# Patient Record
Sex: MALE | Race: BLACK OR AFRICAN AMERICAN | NOT HISPANIC OR LATINO | Employment: FULL TIME | URBAN - METROPOLITAN AREA
[De-identification: names, ages, dates, MRNs, and addresses within clinical notes are randomized per-mention and may not be internally consistent; named-entity substitution may affect disease eponyms.]

---

## 2017-01-06 ENCOUNTER — OFFICE VISIT (OUTPATIENT)
Dept: FAMILY MEDICINE | Facility: CLINIC | Age: 42
End: 2017-01-06
Payer: MEDICAID

## 2017-01-06 VITALS
OXYGEN SATURATION: 98 % | WEIGHT: 246.06 LBS | HEIGHT: 71 IN | RESPIRATION RATE: 12 BRPM | BODY MASS INDEX: 34.45 KG/M2 | HEART RATE: 59 BPM | DIASTOLIC BLOOD PRESSURE: 99 MMHG | SYSTOLIC BLOOD PRESSURE: 142 MMHG

## 2017-01-06 DIAGNOSIS — H65.04 RECURRENT ACUTE SEROUS OTITIS MEDIA OF RIGHT EAR: Primary | ICD-10-CM

## 2017-01-06 DIAGNOSIS — I10 ESSENTIAL HYPERTENSION: ICD-10-CM

## 2017-01-06 DIAGNOSIS — H65.05 RECURRENT ACUTE SEROUS OTITIS MEDIA OF LEFT EAR: ICD-10-CM

## 2017-01-06 PROCEDURE — 99999 PR PBB SHADOW E&M-EST. PATIENT-LVL III: CPT | Mod: PBBFAC,,, | Performed by: PHYSICIAN ASSISTANT

## 2017-01-06 PROCEDURE — 99213 OFFICE O/P EST LOW 20 MIN: CPT | Mod: PBBFAC,PO | Performed by: PHYSICIAN ASSISTANT

## 2017-01-06 PROCEDURE — 99213 OFFICE O/P EST LOW 20 MIN: CPT | Mod: S$PBB,,, | Performed by: PHYSICIAN ASSISTANT

## 2017-01-06 RX ORDER — AMOXICILLIN AND CLAVULANATE POTASSIUM 875; 125 MG/1; MG/1
1 TABLET, FILM COATED ORAL 2 TIMES DAILY
Qty: 20 TABLET | Refills: 0 | Status: SHIPPED | OUTPATIENT
Start: 2017-01-06 | End: 2017-01-16

## 2017-01-06 RX ORDER — AMOXICILLIN AND CLAVULANATE POTASSIUM 875; 125 MG/1; MG/1
1 TABLET, FILM COATED ORAL 2 TIMES DAILY
Qty: 20 TABLET | Refills: 0 | Status: SHIPPED | OUTPATIENT
Start: 2017-01-06 | End: 2017-01-06 | Stop reason: CLARIF

## 2017-01-06 RX ORDER — AMLODIPINE BESYLATE 10 MG/1
10 TABLET ORAL DAILY
COMMUNITY
End: 2017-05-09 | Stop reason: ALTCHOICE

## 2017-01-06 RX ORDER — LOSARTAN POTASSIUM 25 MG/1
25 TABLET ORAL DAILY
COMMUNITY
End: 2017-05-09 | Stop reason: ALTCHOICE

## 2017-01-06 NOTE — PROGRESS NOTES
Subjective:       Patient ID: Shelly Gannon Jr. is a 41 y.o. male.    Chief Complaint: Left ear is still clogged    HPI Comments: Ms. Gannon comes to clinic today concerned about continued ear pain and fullness. The patient was treated with a steroid shot. He was also taking oral antihistamine and decongestent and nasal spray with no improvement. The patient reports that he has not taken any medication for his blood pressure this morning. He cannot recall the name of his blood pressure medications; he reports these are prescribed by his cardiologist.     Review of Systems   Constitutional: Negative for activity change, appetite change and fever.   HENT: Positive for ear pain. Negative for postnasal drip, rhinorrhea and sinus pressure.    Eyes: Negative for visual disturbance.   Respiratory: Negative for cough and shortness of breath.    Cardiovascular: Negative for chest pain.   Neurological: Negative for headaches.   Hematological: Negative for adenopathy.   Psychiatric/Behavioral: The patient is not nervous/anxious.        Objective:      Physical Exam   Constitutional: He is oriented to person, place, and time.   HENT:   Mouth/Throat: Oropharynx is clear and moist. No oropharyngeal exudate.   Bilateral TM bulging and mildly erythematous. Fluid present behind bilateral TM   Cardiovascular: Normal rate, regular rhythm and normal heart sounds.    Pulmonary/Chest: Effort normal and breath sounds normal. He has no wheezes.   Musculoskeletal: He exhibits no edema.   Neurological: He is alert and oriented to person, place, and time.   Skin: No erythema.   Psychiatric: His behavior is normal.       Assessment:       1. Recurrent acute serous otitis media of right ear    2. Essential hypertension    3. Recurrent acute serous otitis media of left ear        Plan:   Shelly was seen today for left ear is still clogged.    Diagnoses and all orders for this visit:    Recurrent acute serous otitis media of right ear  -      Discontinue: amoxicillin-clavulanate 875-125mg (AUGMENTIN) 875-125 mg per tablet; Take 1 tablet by mouth 2 (two) times daily.  -     Ambulatory referral to ENT  -     amoxicillin-clavulanate 875-125mg (AUGMENTIN) 875-125 mg per tablet; Take 1 tablet by mouth 2 (two) times daily.    Essential hypertension  Elevated  Patient did not take medication today  Patient to follow up in 2 weeks for blood pressure check. Patient to bring all medications to blood pressure check  Patient encouraged to be compliant with taking medication daily  Low salt diet  Increase exercise    Recurrent acute serous otitis media of left ear  -     amoxicillin-clavulanate 875-125mg (AUGMENTIN) 875-125 mg per tablet; Take 1 tablet by mouth 2 (two) times daily.    Take antibiotics with food.  Increase fluid intake.  Call the clinic if symptoms worsen, new symptoms develop or if you are not any better after completion of your antibiotics.

## 2017-01-06 NOTE — MR AVS SNAPSHOT
[facility]  [address]  [place] [address]  Phone: [phone]  Fax: [phone]                  Shelly Gannon Jr.   2017 8:20 AM   Office Visit    Description:  Male : 1975   Provider:  Koki Deluna PA-C   Department:  [facility]           Reason for Visit     Left ear is still clogged           Diagnoses this Visit        Comments    Recurrent acute serous otitis media of right ear    -  Primary     Essential hypertension                To Do List           Future Appointments        Provider Department Dept Phone    2017 8:40 AM Koki Deluna PA-C [facility] [phone]      Goals (5 Years of Data)     None       These Medications        Disp Refills Start End    amoxicillin-clavulanate 875-125mg (AUGMENTIN) 875-125 mg per tablet 20 tablet 0 2017    Take 1 tablet by mouth 2 (two) times daily. - Oral    Pharmacy: [address] Ph #: [phone]         [facility]     [facility] On Call Nurse Care Line -  Assistance  Registered nurses in the [facility] On Call Center provide clinical advisement, health education, appointment booking, and other advisory services.  Call for this free service at [phone].             Medications           Message regarding Medications     Verify the changes and/or additions to your medication regime listed below are the same as discussed with your clinician today.  If any of these changes or additions are incorrect, please notify your healthcare provider.        START taking these NEW medications        Refills    amoxicillin-clavulanate 875-125mg (AUGMENTIN) 875-125 mg per tablet 0    Sig: Take 1 tablet by mouth 2 (two) times daily.    Class: Normal    Route: Oral           Verify that the below list of medications is an accurate representation of the medications you are currently taking.  If none  "reported, the list may be blank. If incorrect, please contact your healthcare provider. Carry this list with you in case of emergency.           Current Medications     amoxicillin-clavulanate 875-125mg (AUGMENTIN) 875-125 mg per tablet Take 1 tablet by mouth 2 (two) times daily.           Clinical Reference Information           Vital Signs - Last Recorded  Most recent update: 1/6/2017  8:28 AM by Stephen Kelly MA    BP Pulse Resp Ht Wt SpO2    (!) 142/99 (BP Location: Right arm, Patient Position: Sitting, BP Method: Automatic) (!) 59 12 5' 11" (1.803 m) 111.6 kg (246 lb 0.5 oz) 98%    BMI                34.31 kg/m2          Blood Pressure          Most Recent Value    BP  (!)  142/99      Allergies as of 1/6/2017     No Known Allergies      Immunizations Administered on Date of Encounter - 1/6/2017     None      Orders Placed During Today's Visit      Normal Orders This Visit    Ambulatory referral to ENT       "

## 2017-01-16 ENCOUNTER — DOCUMENTATION ONLY (OUTPATIENT)
Dept: FAMILY MEDICINE | Facility: CLINIC | Age: 42
End: 2017-01-16

## 2017-01-16 NOTE — PROGRESS NOTES
Pre-Visit Chart Review  For Appointment Scheduled on 1/1917    Health Maintenance Due   Topic Date Due    TETANUS VACCINE  04/07/1995    Influenza Vaccine  08/01/2016

## 2017-01-20 ENCOUNTER — CLINICAL SUPPORT (OUTPATIENT)
Dept: FAMILY MEDICINE | Facility: CLINIC | Age: 42
End: 2017-01-20
Payer: MEDICAID

## 2017-01-20 VITALS — SYSTOLIC BLOOD PRESSURE: 129 MMHG | DIASTOLIC BLOOD PRESSURE: 87 MMHG

## 2017-01-24 ENCOUNTER — TELEPHONE (OUTPATIENT)
Dept: FAMILY MEDICINE | Facility: CLINIC | Age: 42
End: 2017-01-24

## 2017-01-24 NOTE — TELEPHONE ENCOUNTER
----- Message from Kaley Gallo sent at 1/23/2017  2:30 PM CST -----  Contact: self [phone]  Please send the referral to Dr Ramirez's office because his ear is still stopped up.  Thank you!

## 2017-01-24 NOTE — TELEPHONE ENCOUNTER
Referral has been faxed to Dr. Ramirez's office and patient has been notified, he states he will call and schedule appointment himself.

## 2017-03-30 ENCOUNTER — TELEPHONE (OUTPATIENT)
Dept: FAMILY MEDICINE | Facility: CLINIC | Age: 42
End: 2017-03-30

## 2017-03-30 NOTE — TELEPHONE ENCOUNTER
Dr Chris Ramirez is not in network with [facility] [employer] Medicaid for adults, patient's referral request was denied as a result. Call placed to patient for notification. No answer received. Left message to call office regarding.

## 2017-03-31 ENCOUNTER — TELEPHONE (OUTPATIENT)
Dept: FAMILY MEDICINE | Facility: CLINIC | Age: 42
End: 2017-03-31

## 2017-03-31 NOTE — TELEPHONE ENCOUNTER
Patient notified referral to Dr. Perez was denied due to Dr. Perez not being in network with [facility] Community [employer] Medicaid for adults. Advised patient he could talk to insurance to see which doctors are covered under his plan and inform office to place a referral to that provider. Patient verbalized understanding.

## 2017-03-31 NOTE — TELEPHONE ENCOUNTER
----- Message from Maddie Parker sent at 3/30/2017  8:26 AM CDT -----  Patient called regarding his referral to  Dr. Bradshaw, ENT,  Please contact patient when authorization is received at  [phone].    Thank you

## 2017-05-01 ENCOUNTER — TELEPHONE (OUTPATIENT)
Dept: FAMILY MEDICINE | Facility: CLINIC | Age: 42
End: 2017-05-01

## 2017-05-01 DIAGNOSIS — H91.92 HEARING LOSS OF LEFT EAR, UNSPECIFIED HEARING LOSS TYPE: Primary | ICD-10-CM

## 2017-05-01 NOTE — TELEPHONE ENCOUNTER
----- Message from Corinna Cohen sent at 5/1/2017 10:15 AM CDT -----  Contact: Patient Came in   Patient came in today regarding a referral for a ENT physician. Patient stated he can not hear at all from the left side and would like  Referral for an ENT that will accept his insurance. He would like to get this set up as soon as possible.     [phone] is the best contact number for him.

## 2017-05-08 ENCOUNTER — DOCUMENTATION ONLY (OUTPATIENT)
Dept: FAMILY MEDICINE | Facility: CLINIC | Age: 42
End: 2017-05-08

## 2017-05-08 NOTE — PROGRESS NOTES
Pre-Visit Chart Review  For Appointment Scheduled on 05/09/2017    Health Maintenance Due   Topic Date Due    TETANUS VACCINE  04/07/1995

## 2017-05-09 ENCOUNTER — OFFICE VISIT (OUTPATIENT)
Dept: FAMILY MEDICINE | Facility: CLINIC | Age: 42
End: 2017-05-09
Payer: MEDICAID

## 2017-05-09 VITALS
BODY MASS INDEX: 34.54 KG/M2 | TEMPERATURE: 98 F | HEART RATE: 62 BPM | HEIGHT: 71 IN | SYSTOLIC BLOOD PRESSURE: 127 MMHG | WEIGHT: 246.69 LBS | DIASTOLIC BLOOD PRESSURE: 85 MMHG

## 2017-05-09 DIAGNOSIS — H69.92 EUSTACHIAN TUBE DYSFUNCTION, LEFT: ICD-10-CM

## 2017-05-09 DIAGNOSIS — Z23 NEED FOR TETANUS BOOSTER: ICD-10-CM

## 2017-05-09 DIAGNOSIS — Z00.00 ANNUAL PHYSICAL EXAM: Primary | ICD-10-CM

## 2017-05-09 PROCEDURE — 99999 PR PBB SHADOW E&M-EST. PATIENT-LVL III: CPT | Mod: PBBFAC,,, | Performed by: FAMILY MEDICINE

## 2017-05-09 PROCEDURE — 99396 PREV VISIT EST AGE 40-64: CPT | Mod: S$PBB,,, | Performed by: FAMILY MEDICINE

## 2017-05-09 PROCEDURE — 99213 OFFICE O/P EST LOW 20 MIN: CPT | Mod: PBBFAC,PO | Performed by: FAMILY MEDICINE

## 2017-05-09 NOTE — PATIENT INSTRUCTIONS
Controlling High Blood Pressure  High blood pressure (hypertension) is often called the silent killer. This is because many people who have it dont know it. High blood pressure is defined as 140/90 mm Hg or higher. Know your blood pressure and remember to check it regularly. Doing so can save your life. Here are some things you can do to help control your blood pressure.    Choose heart-healthy foods  · Select low-salt, low-fat foods. Limit sodium intake to 2,400 mg per day or the amount suggested by your healthcare provider.  · Limit canned, dried, cured, packaged, and fast foods. These can contain a lot of salt.  · Eat 8 to 10 servings of fruits and vegetables every day.  · Choose lean meats, fish, or chicken.  · Eat whole-grain pasta, brown rice, and beans.  · Eat 2 to 3 servings of low-fat or fat-free dairy products.  · Ask your doctor about the DASH eating plan. This plan helps reduce blood pressure.  · When you go to a restaurant, ask that your meal be prepared with no added salt.  Maintain a healthy weight  · Ask your healthcare provider how many calories to eat a day. Then stick to that number.  · Ask your healthcare provider what weight range is healthiest for you. If you are overweight, a weight loss of only 3% to 5% of your body weight can help lower blood pressure. Generally, a good weight loss goal is to lose 10% of your body weight in a year.  · Limit snacks and sweets.  · Get regular exercise.  Get up and get active  · Choose activities you enjoy. Find ones you can do with friends or family. This includes bicycling, dancing, walking, and jogging.  · Park farther away from building entrances.  · Use stairs instead of the elevator.  · When you can, walk or bike instead of driving.  · [place] leaves, garden, or do household repairs.  · Be active at a moderate to vigorous level of physical activity for at least 40 minutes for a minimum of 3 to 4 days a week.   Manage stress  · Make time to relax and enjoy  life. Find time to laugh.  · Communicate your concerns with your loved ones and your healthcare provider.  · Visit with family and friends, and keep up with hobbies.  Limit alcohol and quit smoking  · Men should have no more than 2 drinks per day.  · Women should have no more than 1 drink per day.  · Talk with your healthcare provider about quitting smoking. Smoking significantly increases your risk for heart disease and stroke. Ask your healthcare provider about community smoking cessation programs and other options.  Medicines  If lifestyle changes arent enough, your healthcare provider may prescribe high blood pressure medicine. Take all medicines as prescribed. If you have any questions about your medicines, ask your healthcare provider before stopping or changing them.   Date Last Reviewed: 4/27/2016 © 2000-2016 [employer]. [address] [place] [address], [place] [address]. All rights reserved. This information is not intended as a substitute for professional medical care. Always follow your healthcare professional's instructions.        Weight Management: Getting Started  Healthy bodies come in all shapes and sizes. Not all bodies are made to be thin. For some people, a healthy weight is higher than the average weight listed on weight charts. Your healthcare provider can help you decide on a healthy weight for you.    Reasons to lose weight  Losing weight can help with some health problems, such as high blood pressure, heart disease, diabetes, sleep apnea, and arthritis. You may also feel more energy.  Set your long-term goal  Your goal doesn't even have to be a specific weight. You may decide on a fitness goal (such as being able to walk 10 miles a week), or a health goal (such as lowering your blood pressure). Choose a goal that is measurable and reasonable, so you know when you've reached it. A goal of reaching a BMI of less than 25 is not always reasonable (or possible).   Make an action  plan  Habits dont change overnight. Setting your goals too high can leave you feeling discouraged if you cant reach them. Be realistic. Choose one or two small changes you can make now. Set an action plan for how you are going to make these changes. When you can stick to this plan, keep making a few more small changes. Taking small steps will help you stay on the path to success.  Track your progress  Write down your goals. Then, keep a daily record of your progress. Write down what you eat and how active you are. This record lets you look back on how much youve done. It may also help when youre feeling frustrated. Reward yourself for success. Even if you dont reach every goal, give yourself credit for what you do get done.  Get support  Encouragement from others can help make losing weight easier. Ask your family members and friends for support. They may even want to join you. Also look to your healthcare provider, registered dietitian, and  for help. Your local hospital can give you more information about nutrition, exercise, and weight loss.  Date Last Reviewed: 1/31/2016 © 2000-2016 [employer]. [address] [place] [address]. All rights reserved. This information is not intended as a substitute for professional medical care. Always follow your healthcare professional's instructions.        Walking for Fitness  Fitness walking has something for everyone, even people who are already fit. Walking is one of the safest ways to condition your body aerobically. It can boost energy, help you lose weight, and reduce stress.    Physical benefits  · Walking strengthens your heart and lungs, and tones your muscles.  · When walking, your feet land with less impact than in other sports. This reduces chances of muscle, bone, and joint injury.  · Regular walking improves your cholesterol levels and lowers your risk of heart disease. And it helps you control your blood sugar if  you have diabetes.  · Walking is a weight-bearing activity, which helps maintain bone density. This can help prevent osteoporosis.  Personal rewards  · Taking walks can help you relax and manage stress. And fitness walking may make you feel better about yourself.  · Walking can help you sleep better at night and make you less likely to be depressed.  · Regular walking may help maintain your memory as you get older.  · Walking is a great way to spend extra time with friends and family members. Be sure to invite your dog along!  Q&A about fitness walking  Q: Will walking keep me fit?  A: Yes. Regular walking at the right pace gives you all the benefits of other aerobic activities, such as jogging and swimming.  Q: Will walking help me lose weight and keep it off?  A: Yes. Per mile, walking can burn as many calories as jogging. Your health care provider can help work walking into your weight-loss plan.  Q: Is walking safe for my health?  A: Yes. Walking is safe if you have high blood pressure, diabetes, heart disease, or other conditions. Talk to your health care provider before you start.  Date Last Reviewed: 5/9/2015  © 4132-2748 [employer]. [address] [place] [address], [place] [address]. All rights reserved. This information is not intended as a substitute for professional medical care. Always follow your healthcare professional's instructions.

## 2017-05-09 NOTE — MR AVS SNAPSHOT
[facility]  [address] [place] [address]  Phone: [phone]  Fax: [phone]                  Shelly Gannon Jr.   2017 2:40 PM   Office Visit    Description:  Male : 1975   Provider:  Steven Alba MD   Department:  [facility]           Reason for Visit     problem hearing out of left ear           Diagnoses this Visit        Comments    Annual physical exam    -  Primary     Need for tetanus booster         Eustachian tube dysfunction, left                To Do List           Goals (5 Years of Data)     None      Follow-Up and Disposition     Return in about 1 year (around 2018).      [facility]     [facility] Nurse Care Line -  Assistance  Unless otherwise directed by your provider, please contact [facility], our nurse care line that is available for  assistance.     Registered nurses in the [facility] provide: appointment scheduling, clinical advisement, health education, and other advisory services.  Call: [phone] (toll free)               Medications           Message regarding Medications     Verify the changes and/or additions to your medication regime listed below are the same as discussed with your clinician today.  If any of these changes or additions are incorrect, please notify your healthcare provider.        STOP taking these medications     amlodipine (NORVASC) 10 MG tablet Take 10 mg by mouth once daily.    losartan (COZAAR) 25 MG tablet Take 25 mg by mouth once daily.           Verify that the below list of medications is an accurate representation of the medications you are currently taking.  If none reported, the list may be blank. If incorrect, please contact your healthcare provider. Carry this list with you in case of emergency.           Current Medications            Clinical Reference Information           Your Vitals Were     BP Pulse Temp    127/85 (BP Location: Right arm,  "Patient Position: Sitting, BP Method: Automatic) 62 98.4 °F (36.9 °C) (Oral)    Height Weight BMI    5' 11" (1.803 m) 111.9 kg (246 lb 11.1 oz) 34.41 kg/m2      Blood Pressure          Most Recent Value    BP  127/85      Allergies as of 5/9/2017     No Known Allergies      Immunizations Administered on Date of Encounter - 5/9/2017     Name Date Dose VIS Date Route    TDAP  Incomplete 0.5 mL 2/24/2015 Intramuscular      Orders Placed During Today's Visit      Normal Orders This Visit    Tdap Vaccine     Future Labs/Procedures Expected by Expires    CBC auto differential  5/9/2017 7/8/2018    Comprehensive metabolic panel  5/9/2017 7/8/2018    Lipid panel  5/9/2017 7/8/2018      Instructions      Controlling High Blood Pressure  High blood pressure (hypertension) is often called the silent killer. This is because many people who have it dont know it. High blood pressure is defined as 140/90 mm Hg or higher. Know your blood pressure and remember to check it regularly. Doing so can save your life. Here are some things you can do to help control your blood pressure.    Choose heart-healthy foods  · Select low-salt, low-fat foods. Limit sodium intake to 2,400 mg per day or the amount suggested by your healthcare provider.  · Limit canned, dried, cured, packaged, and fast foods. These can contain a lot of salt.  · Eat 8 to 10 servings of fruits and vegetables every day.  · Choose lean meats, fish, or chicken.  · Eat whole-grain pasta, brown rice, and beans.  · Eat 2 to 3 servings of low-fat or fat-free dairy products.  · Ask your doctor about the DASH eating plan. This plan helps reduce blood pressure.  · When you go to a restaurant, ask that your meal be prepared with no added salt.  Maintain a healthy weight  · Ask your healthcare provider how many calories to eat a day. Then stick to that number.  · Ask your healthcare provider what weight range is healthiest for you. If you are overweight, a weight loss of only 3% to " 5% of your body weight can help lower blood pressure. Generally, a good weight loss goal is to lose 10% of your body weight in a year.  · Limit snacks and sweets.  · Get regular exercise.  Get up and get active  · Choose activities you enjoy. Find ones you can do with friends or family. This includes bicycling, dancing, walking, and jogging.  · Park farther away from building entrances.  · Use stairs instead of the elevator.  · When you can, walk or bike instead of driving.  · [place] leaves, garden, or do household repairs.  · Be active at a moderate to vigorous level of physical activity for at least 40 minutes for a minimum of 3 to 4 days a week.   Manage stress  · Make time to relax and enjoy life. Find time to laugh.  · Communicate your concerns with your loved ones and your healthcare provider.  · Visit with family and friends, and keep up with hobbies.  Limit alcohol and quit smoking  · Men should have no more than 2 drinks per day.  · Women should have no more than 1 drink per day.  · Talk with your healthcare provider about quitting smoking. Smoking significantly increases your risk for heart disease and stroke. Ask your healthcare provider about community smoking cessation programs and other options.  Medicines  If lifestyle changes arent enough, your healthcare provider may prescribe high blood pressure medicine. Take all medicines as prescribed. If you have any questions about your medicines, ask your healthcare provider before stopping or changing them.   Date Last Reviewed: 4/27/2016  © 4479-2643 [employer]. [address] [place] [address], [place] [address]. All rights reserved. This information is not intended as a substitute for professional medical care. Always follow your healthcare professional's instructions.        Weight Management: Getting Started  Healthy bodies come in all shapes and sizes. Not all bodies are made to be thin. For some people, a healthy weight is higher than the average  weight listed on weight charts. Your healthcare provider can help you decide on a healthy weight for you.    Reasons to lose weight  Losing weight can help with some health problems, such as high blood pressure, heart disease, diabetes, sleep apnea, and arthritis. You may also feel more energy.  Set your long-term goal  Your goal doesn't even have to be a specific weight. You may decide on a fitness goal (such as being able to walk 10 miles a week), or a health goal (such as lowering your blood pressure). Choose a goal that is measurable and reasonable, so you know when you've reached it. A goal of reaching a BMI of less than 25 is not always reasonable (or possible).   Make an action plan  Habits dont change overnight. Setting your goals too high can leave you feeling discouraged if you cant reach them. Be realistic. Choose one or two small changes you can make now. Set an action plan for how you are going to make these changes. When you can stick to this plan, keep making a few more small changes. Taking small steps will help you stay on the path to success.  Track your progress  Write down your goals. Then, keep a daily record of your progress. Write down what you eat and how active you are. This record lets you look back on how much youve done. It may also help when youre feeling frustrated. Reward yourself for success. Even if you dont reach every goal, give yourself credit for what you do get done.  Get support  Encouragement from others can help make losing weight easier. Ask your family members and friends for support. They may even want to join you. Also look to your healthcare provider, registered dietitian, and  for help. Your local hospital can give you more information about nutrition, exercise, and weight loss.  Date Last Reviewed: 1/31/2016  © 6079-7541 [employer]. [address] [place] [address], [place] [address]. All rights reserved. This information is not intended  as a substitute for professional medical care. Always follow your healthcare professional's instructions.        Walking for Fitness  Fitness walking has something for everyone, even people who are already fit. Walking is one of the safest ways to condition your body aerobically. It can boost energy, help you lose weight, and reduce stress.    Physical benefits  · Walking strengthens your heart and lungs, and tones your muscles.  · When walking, your feet land with less impact than in other sports. This reduces chances of muscle, bone, and joint injury.  · Regular walking improves your cholesterol levels and lowers your risk of heart disease. And it helps you control your blood sugar if you have diabetes.  · Walking is a weight-bearing activity, which helps maintain bone density. This can help prevent osteoporosis.  Personal rewards  · Taking walks can help you relax and manage stress. And fitness walking may make you feel better about yourself.  · Walking can help you sleep better at night and make you less likely to be depressed.  · Regular walking may help maintain your memory as you get older.  · Walking is a great way to spend extra time with friends and family members. Be sure to invite your dog along!  Q&A about fitness walking  Q: Will walking keep me fit?  A: Yes. Regular walking at the right pace gives you all the benefits of other aerobic activities, such as jogging and swimming.  Q: Will walking help me lose weight and keep it off?  A: Yes. Per mile, walking can burn as many calories as jogging. Your health care provider can help work walking into your weight-loss plan.  Q: Is walking safe for my health?  A: Yes. Walking is safe if you have high blood pressure, diabetes, heart disease, or other conditions. Talk to your health care provider before you start.  Date Last Reviewed: 5/9/2015  © 4396-8461 [employer]. [address] [place] [address], [place] [address]. All rights reserved. This  information is not intended as a substitute for professional medical care. Always follow your healthcare professional's instructions.             Language Assistance Services     ATTENTION: Language assistance services are available, free of charge. Please call [phone].      ATENCIÓN: Si neville marrero, tiene a hannah disposición servicios gratuitos de asistencia lingüística. Llame al [phone].     CHÚ Ý: N?u b?n nói Ti?ng Vi?t, có các d?ch v? h? tr? ngôn ng? mi?n phí dành cho b?n. G?i s? [phone].         [facility] complies with applicable Federal civil rights laws and does not discriminate on the basis of race, color, national origin, age, disability, or sex.

## 2017-05-23 NOTE — PROGRESS NOTES
"CC:Well exam    He has migraines.  He was  able to tolerate Atenol.  He had recent heart palpitations, short live.   Headaches are infrequent no nausea no vomiting.  Past Medical History:   Diagnosis Date    Allergy     Hypertension     Migraine headache     Migraine syndrome        MEDICATIONS: MedCard reviewed and or reconciled.    ALLERGIES: Allergy card reviewed and or reconciled.    ROS:  GENERAL: Denies weight changes. Denies fever or chills.  HEENT: No vision changes no hearing loss  CHEST: Denies chest pain.   GI: Denies abdominal pain. Denies black stools.  : Denies painful urination. Denies blood in urine.  MS:Back pain as above Denies joint pain. Denies joint swelling.     PE:   Vital Signs: /85 (BP Location: Right arm, Patient Position: Sitting, BP Method: Automatic)   Pulse 62   Temp 98.4 °F (36.9 °C) (Oral)   Ht 5' 11" (1.803 m)   Wt 111.9 kg (246 lb 11.1 oz)   BMI 34.41 kg/m²   APPEARENCE: Well nourished, well developed, in no acute distress.  SKIN: Normal skin turgor, no lesion.  EENT: Pupils are equal, round,  and reactive to light and accommodation.AS TM i Clear sclerae. C.  Congestive turbinates.NO Erythema pharynx.No palpable cervical lymph nodes No  masses or thyromegaly.  CHEST: Lungs clear to auscutation and percussion.No retractions no wheezing  CARDIOVASCULAR: Normal S1, S2. No rubs murmurs or gallops. PMI nondisplaced  ABDOMEN: Soft, no tenderness or masses. No hepatosplenomegaly.  Lumbosacral tenderness, piriformis tenderness.  Greater trochanter nontender.  Right knee crepitations, no effusion, normal exam.  Adequate gait  EXTREMITIES: Full range of motion. No clubbing, cyanosis or edema.    ASSESSMENT:    Annual physical exam  -     Lipid panel; Future  -     Comprehensive metabolic panel; Future  -     CBC auto differential; Future    Need for tetanus booster  -     Tdap Vaccine    Eustachian tube dysfunction, left    Patient readiness: acceptance and " barriers:none    During the course of the visit the patient was educated and counseled about the following:     Obesity:   General weight loss/lifestyle modification strategies discussed (elicit support from others; identify saboteurs; non-food rewards, etc).  Behavioral treatment: Slim Fast and stress management.  Diet interventions: low calorie (1000 kCal/d) deficit diet, qualitative changes (increase low-fat,  high-fiber foods) and referral to dietitian for guidance in these changes.  Regular aerobic exercise program discussed.    Goals: Obesity: Reduce calorie intake and BMI    Did patient meet goals/outcomes: No    The following self management tools provided: excercise log    Patient Instructions (the written plan) was given to the patient/family.     Time spent with patient: 30 minutes

## 2018-08-29 ENCOUNTER — HOSPITAL ENCOUNTER (EMERGENCY)
Facility: HOSPITAL | Age: 43
Discharge: HOME | End: 2018-08-29
Attending: EMERGENCY MEDICINE
Payer: MEDICARE

## 2018-08-29 VITALS
RESPIRATION RATE: 17 BRPM | SYSTOLIC BLOOD PRESSURE: 157 MMHG | OXYGEN SATURATION: 99 % | TEMPERATURE: 99 F | WEIGHT: 235 LBS | HEIGHT: 71 IN | BODY MASS INDEX: 32.9 KG/M2 | DIASTOLIC BLOOD PRESSURE: 100 MMHG | HEART RATE: 63 BPM

## 2018-08-29 DIAGNOSIS — S46.912A SHOULDER STRAIN, LEFT, INITIAL ENCOUNTER: Primary | ICD-10-CM

## 2018-08-29 PROCEDURE — 96372 THER/PROPH/DIAG INJ SC/IM: CPT

## 2018-08-29 PROCEDURE — 99283 EMERGENCY DEPT VISIT LOW MDM: CPT | Mod: 25

## 2018-08-29 PROCEDURE — 63600175 PHARM REV CODE 636 W HCPCS: Performed by: EMERGENCY MEDICINE

## 2018-08-29 RX ORDER — KETOROLAC TROMETHAMINE 30 MG/ML
10 INJECTION, SOLUTION INTRAMUSCULAR; INTRAVENOUS
Status: COMPLETED | OUTPATIENT
Start: 2018-08-29 | End: 2018-08-29

## 2018-08-29 RX ORDER — DEXAMETHASONE SODIUM PHOSPHATE 4 MG/ML
8 INJECTION, SOLUTION INTRA-ARTICULAR; INTRALESIONAL; INTRAMUSCULAR; INTRAVENOUS; SOFT TISSUE
Status: COMPLETED | OUTPATIENT
Start: 2018-08-29 | End: 2018-08-29

## 2018-08-29 RX ORDER — IBUPROFEN 800 MG/1
800 TABLET ORAL EVERY 6 HOURS PRN
Qty: 20 TABLET | Refills: 0 | Status: ON HOLD | OUTPATIENT
Start: 2018-08-29 | End: 2022-01-21 | Stop reason: HOSPADM

## 2018-08-29 RX ORDER — AMLODIPINE BESYLATE 5 MG/1
10 TABLET ORAL DAILY
Qty: 30 TABLET | Refills: 3 | Status: SHIPPED | OUTPATIENT
Start: 2018-08-29 | End: 2019-08-28 | Stop reason: ALTCHOICE

## 2018-08-29 RX ADMIN — DEXAMETHASONE SODIUM PHOSPHATE 8 MG: 4 INJECTION, SOLUTION INTRAMUSCULAR; INTRAVENOUS at 09:08

## 2018-08-29 RX ADMIN — KETOROLAC TROMETHAMINE 10 MG: 30 INJECTION, SOLUTION INTRAMUSCULAR at 09:08

## 2018-08-30 NOTE — ED PROVIDER NOTES
Encounter Date: 8/29/2018    SCRIBE #1 NOTE: Angeline AGUILAR, christy scribing for, and in the presence of, .       History     Chief Complaint   Patient presents with    Shoulder Pain     left shoulder pain beggining last friday   no known trauma to shoulder        Time seen by provider: 8:47 PM on 08/29/2018    Shelly Gannon Jr. is a 43 y.o. male with HTN, who presents to the ED with shoulder pain. Patient states he is having left shoulder pain along the top of his shoulder that is exacerbated by moving his arm specifically when reaching across his body or behind his back. He notes he is an  and is typically working with his arms. Patient denies any numbness, tingling, weakness, neck pain, swelling, or fever. He also requests a refill of his amplodipine.       The history is provided by the patient. No  was used.     Review of patient's allergies indicates:  No Known Allergies  Past Medical History:   Diagnosis Date    Allergy     Hypertension     Migraine headache     Migraine syndrome      Past Surgical History:   Procedure Laterality Date    FRACTURE SURGERY      LUMBAR DISC SURGERY Bilateral     L4- L5 , 2016 April,  Trans    VOCAL FOLD LESION EXCISION  2008     Family History   Problem Relation Age of Onset    Allergic rhinitis Mother     Hypertension Mother     Heart disease Mother     Stroke Mother     Heart disease Father 49    Cancer Maternal Aunt         breast    Heart disease Maternal Aunt     Angioedema Neg Hx     Asthma Neg Hx     Atopy Neg Hx     Eczema Neg Hx     Immunodeficiency Neg Hx     Urticaria Neg Hx      Social History     Tobacco Use    Smoking status: Never Smoker   Substance Use Topics    Alcohol use: No    Drug use: No     Review of Systems   Constitutional: Negative for fever.   HENT: Negative for drooling and sore throat.    Eyes: Negative for photophobia and visual disturbance.   Respiratory: Negative for shortness  of breath.    Cardiovascular: Negative for chest pain.   Gastrointestinal: Negative for abdominal pain, diarrhea, nausea and vomiting.   Genitourinary: Negative for dysuria.   Musculoskeletal: Positive for arthralgias (left shoulder). Negative for back pain, joint swelling and neck pain.   Skin: Negative for rash.   Neurological: Negative for weakness and numbness.   Hematological: Does not bruise/bleed easily.       Physical Exam     Initial Vitals [08/29/18 2040]   BP Pulse Resp Temp SpO2   (!) 157/100 63 17 98.8 °F (37.1 °C) 99 %      MAP       --         Physical Exam    Nursing note and vitals reviewed.  Constitutional: He appears well-developed and well-nourished.  Non-toxic appearance. No distress.   HENT:   Head: Normocephalic and atraumatic.   Eyes: EOM are normal. Pupils are equal, round, and reactive to light.   Neck: Normal range of motion. Neck supple. No neck rigidity. No JVD present.   Cardiovascular: Normal rate, regular rhythm, normal heart sounds and intact distal pulses. Exam reveals no gallop and no friction rub.    No murmur heard.  Pulmonary/Chest: Breath sounds normal. He has no wheezes. He has no rhonchi. He has no rales.   Abdominal: Soft. Bowel sounds are normal. He exhibits no distension. There is no tenderness. There is no rigidity, no rebound and no guarding.   Musculoskeletal: Normal range of motion. He exhibits no edema or tenderness.        Left shoulder: He exhibits pain. He exhibits normal range of motion, no tenderness, no bony tenderness, no swelling, no effusion, no deformity, normal pulse and normal strength.   Full ROM of the shoulder with normal strength, sensation, and pulses. Experiences pain with forward flexion and reaching across. 5/5 strength past abduction 80 degrees. No evidence of joint effusion. No warmth or tenderness.    Neurological: He is alert and oriented to person, place, and time. He has normal strength and normal reflexes. No cranial nerve deficit or sensory  deficit. He exhibits normal muscle tone. Coordination normal. GCS eye subscore is 4. GCS verbal subscore is 5. GCS motor subscore is 6.   5/5 strength and normal sensation of upper extremities.    Skin: Skin is warm and dry.   Psychiatric: He has a normal mood and affect. His speech is normal and behavior is normal. He is not actively hallucinating.         ED Course   Procedures  Labs Reviewed - No data to display       Imaging Results          X-Ray Shoulder 2 or More Views Left (In process)                  Medical Decision Making:   History:   Old Medical Records: I decided to obtain old medical records.  Initial Assessment:   43-year-old man who presents emergency department complaining of shoulder pain for the past 6 days.  Pain happened after waking up from sleep.  No known trauma.  Denies any fever.  Patient with normal strength and full range of motion of the shoulder although he does have some pain with forward flexion.  Neurovascularly intact.  X-rays are unremarkable. I suspect he slept on his shoulder wrong and suffered a shoulder strain.  Will treat him with IM steroid and NSAIDs.  PCP follow-up.  Return for fever or worsening pain. I have low suspicion for septic joint, underlying fracture.  Clinical Tests:   Radiological Study: Ordered and Reviewed            Scribe Attestation:   Scribe #1: I performed the above scribed service and the documentation accurately describes the services I performed. I attest to the accuracy of the note.    I, Bahman Bae, personally performed the services described in this documentation. All medical record entries made by the scribe were at my direction and in my presence.  I have reviewed the chart and agree that the record reflects my personal performance and is accurate and complete. Cornell Ruggiero MD.  9:56 PM 08/29/2018             Clinical Impression:   The encounter diagnosis was Shoulder strain, left, initial encounter.      Disposition:   Disposition:  Discharged  Condition: Stable                        Cornell Ruggiero MD  08/29/18 9048

## 2019-08-26 ENCOUNTER — HOSPITAL ENCOUNTER (EMERGENCY)
Facility: HOSPITAL | Age: 44
Discharge: HOME | End: 2019-08-26
Attending: EMERGENCY MEDICINE
Payer: MEDICAID

## 2019-08-26 VITALS
SYSTOLIC BLOOD PRESSURE: 139 MMHG | BODY MASS INDEX: 32.2 KG/M2 | HEIGHT: 71 IN | TEMPERATURE: 98 F | OXYGEN SATURATION: 94 % | HEART RATE: 64 BPM | DIASTOLIC BLOOD PRESSURE: 87 MMHG | WEIGHT: 230 LBS | RESPIRATION RATE: 21 BRPM

## 2019-08-26 DIAGNOSIS — R07.9 CHEST PAIN: Primary | ICD-10-CM

## 2019-08-26 LAB
ALBUMIN SERPL BCP-MCNC: 4.3 G/DL (ref 3.5–5.2)
ALP SERPL-CCNC: 48 U/L (ref 55–135)
ALT SERPL W/O P-5'-P-CCNC: 23 U/L (ref 10–44)
ANION GAP SERPL CALC-SCNC: 7 MMOL/L (ref 8–16)
AST SERPL-CCNC: 19 U/L (ref 10–40)
BASOPHILS # BLD AUTO: 0.04 K/UL (ref 0–0.2)
BASOPHILS NFR BLD: 0.9 % (ref 0–1.9)
BILIRUB SERPL-MCNC: 0.9 MG/DL (ref 0.1–1)
BUN SERPL-MCNC: 15 MG/DL (ref 6–20)
CALCIUM SERPL-MCNC: 9.5 MG/DL (ref 8.7–10.5)
CHLORIDE SERPL-SCNC: 107 MMOL/L (ref 95–110)
CO2 SERPL-SCNC: 29 MMOL/L (ref 23–29)
CREAT SERPL-MCNC: 1.2 MG/DL (ref 0.5–1.4)
D DIMER PPP IA.FEU-MCNC: 0.31 UG/ML FEU
DIFFERENTIAL METHOD: NORMAL
EOSINOPHIL # BLD AUTO: 0.1 K/UL (ref 0–0.5)
EOSINOPHIL NFR BLD: 2.6 % (ref 0–8)
ERYTHROCYTE [DISTWIDTH] IN BLOOD BY AUTOMATED COUNT: 13.7 % (ref 11.5–14.5)
EST. GFR  (AFRICAN AMERICAN): >60 ML/MIN/1.73 M^2
EST. GFR  (NON AFRICAN AMERICAN): >60 ML/MIN/1.73 M^2
GLUCOSE SERPL-MCNC: 101 MG/DL (ref 70–110)
HCT VFR BLD AUTO: 47.9 % (ref 40–54)
HGB BLD-MCNC: 16 G/DL (ref 14–18)
IMM GRANULOCYTES # BLD AUTO: 0.01 K/UL (ref 0–0.04)
IMM GRANULOCYTES NFR BLD AUTO: 0.2 % (ref 0–0.5)
INR PPP: 1.1
LYMPHOCYTES # BLD AUTO: 1.5 K/UL (ref 1–4.8)
LYMPHOCYTES NFR BLD: 31.8 % (ref 18–48)
MCH RBC QN AUTO: 29.8 PG (ref 27–31)
MCHC RBC AUTO-ENTMCNC: 33.4 G/DL (ref 32–36)
MCV RBC AUTO: 89 FL (ref 82–98)
MONOCYTES # BLD AUTO: 0.5 K/UL (ref 0.3–1)
MONOCYTES NFR BLD: 10.1 % (ref 4–15)
NEUTROPHILS # BLD AUTO: 2.5 K/UL (ref 1.8–7.7)
NEUTROPHILS NFR BLD: 54.4 % (ref 38–73)
NRBC BLD-RTO: 0 /100 WBC
PLATELET # BLD AUTO: 173 K/UL (ref 150–350)
PMV BLD AUTO: 11.5 FL (ref 9.2–12.9)
POTASSIUM SERPL-SCNC: 3.8 MMOL/L (ref 3.5–5.1)
PROT SERPL-MCNC: 7.6 G/DL (ref 6–8.4)
PROTHROMBIN TIME: 13.3 SEC (ref 11.7–14)
RBC # BLD AUTO: 5.37 M/UL (ref 4.6–6.2)
SODIUM SERPL-SCNC: 143 MMOL/L (ref 136–145)
TROPONIN I SERPL DL<=0.01 NG/ML-MCNC: <0.03 NG/ML (ref 0.02–0.04)
WBC # BLD AUTO: 4.66 K/UL (ref 3.9–12.7)

## 2019-08-26 PROCEDURE — 85025 COMPLETE CBC W/AUTO DIFF WBC: CPT

## 2019-08-26 PROCEDURE — 36415 COLL VENOUS BLD VENIPUNCTURE: CPT

## 2019-08-26 PROCEDURE — 85379 FIBRIN DEGRADATION QUANT: CPT

## 2019-08-26 PROCEDURE — 84484 ASSAY OF TROPONIN QUANT: CPT

## 2019-08-26 PROCEDURE — 85610 PROTHROMBIN TIME: CPT

## 2019-08-26 PROCEDURE — 80053 COMPREHEN METABOLIC PANEL: CPT

## 2019-08-26 PROCEDURE — 93005 ELECTROCARDIOGRAM TRACING: CPT

## 2019-08-26 PROCEDURE — 99285 EMERGENCY DEPT VISIT HI MDM: CPT | Mod: 25

## 2019-08-26 NOTE — ED PROVIDER NOTES
Encounter Date: 8/26/2019       History     Chief Complaint   Patient presents with    Chest Pain     44-year-old male presents complaining of burning substernal chest discomfort, patient had these symptoms 2-3 days ago, patient has not had symptoms since then.  Patient has no other complaints at this time patient has a history of hypertension.  Patient denies shortness of breath patient denies palpitations.        Review of patient's allergies indicates:  No Known Allergies  Past Medical History:   Diagnosis Date    Allergy     Hypertension     Migraine headache     Migraine syndrome      Past Surgical History:   Procedure Laterality Date    FRACTURE SURGERY      LUMBAR DISC SURGERY Bilateral     L4- L5 , 2016 April,  Trans    VOCAL FOLD LESION EXCISION  2008     Family History   Problem Relation Age of Onset    Allergic rhinitis Mother     Hypertension Mother     Heart disease Mother     Stroke Mother     Heart disease Father 49    Cancer Maternal Aunt         breast    Heart disease Maternal Aunt     Angioedema Neg Hx     Asthma Neg Hx     Atopy Neg Hx     Eczema Neg Hx     Immunodeficiency Neg Hx     Urticaria Neg Hx      Social History     Tobacco Use    Smoking status: Never Smoker   Substance Use Topics    Alcohol use: No    Drug use: No     Review of Systems   Constitutional: Negative for fever.   HENT: Negative for congestion, rhinorrhea, sore throat and trouble swallowing.    Eyes: Negative for visual disturbance.   Respiratory: Negative for cough, chest tightness, shortness of breath and wheezing.    Cardiovascular: Negative for chest pain, palpitations and leg swelling.        Chest wall pain   Gastrointestinal: Negative for abdominal distention, abdominal pain, constipation, diarrhea, nausea and vomiting.   Genitourinary: Negative for difficulty urinating, dysuria, flank pain and frequency.   Musculoskeletal: Negative for arthralgias, back pain, joint swelling and neck pain.    Skin: Negative for color change and rash.   Neurological: Negative for dizziness, syncope, speech difficulty, weakness, numbness and headaches.   All other systems reviewed and are negative.      Physical Exam     Initial Vitals [08/26/19 1252]   BP Pulse Resp Temp SpO2   (!) 150/97 73 16 98.4 °F (36.9 °C) 98 %      MAP       --         Physical Exam    Nursing note and vitals reviewed.  Constitutional: He appears well-developed and well-nourished. He is not diaphoretic. No distress.   HENT:   Head: Normocephalic and atraumatic.   Right Ear: External ear normal.   Left Ear: External ear normal.   Nose: Nose normal.   Mouth/Throat: Oropharynx is clear and moist. No oropharyngeal exudate.   Eyes: Conjunctivae and EOM are normal. Pupils are equal, round, and reactive to light. Right eye exhibits no discharge. Left eye exhibits no discharge. No scleral icterus.   Neck: Normal range of motion. Neck supple. No thyromegaly present. No tracheal deviation present. No JVD present.   Cardiovascular: Normal rate, regular rhythm, normal heart sounds and intact distal pulses. Exam reveals no gallop and no friction rub.    No murmur heard.  Pulmonary/Chest: Breath sounds normal. No stridor. No respiratory distress. He has no wheezes. He has no rhonchi. He has no rales. He exhibits no tenderness.   Abdominal: Soft. Bowel sounds are normal. He exhibits no distension and no mass. There is no tenderness. There is no rebound and no guarding.   Musculoskeletal: Normal range of motion. He exhibits no edema or tenderness.   Lymphadenopathy:     He has no cervical adenopathy.   Neurological: He is alert and oriented to person, place, and time. He has normal strength and normal reflexes. He displays normal reflexes. No cranial nerve deficit or sensory deficit.   Skin: Skin is warm and dry. No rash noted. No erythema.         ED Course   Procedures  Labs Reviewed   COMPREHENSIVE METABOLIC PANEL - Abnormal; Notable for the following  components:       Result Value    Alkaline Phosphatase 48 (*)     Anion Gap 7 (*)     All other components within normal limits   CBC W/ AUTO DIFFERENTIAL   TROPONIN I   PROTIME-INR   D DIMER, QUANTITATIVE   D DIMER, QUANTITATIVE        ECG Results          EKG 12-lead (In process)  Result time 08/26/19 13:13:18    In process by Interface, Lab In [facility] (08/26/19 13:13:18)                 Narrative:    Test Reason : R07.9,    Vent. Rate : 073 BPM     Atrial Rate : 073 BPM     P-R Int : 152 ms          QRS Dur : 086 ms      QT Int : 406 ms       P-R-T Axes : 055 031 -02 degrees     QTc Int : 447 ms    Normal sinus rhythm  Nonspecific T wave abnormality  Abnormal ECG  When compared with ECG of 29-AUG-2016 17:44,  No significant change was found    Referred By:             Confirmed By:                             Imaging Results          X-Ray Chest 1 View (Final result)  Result time 08/26/19 13:39:26    Final result by Santos Ty MD (08/26/19 13:39:26)                 Impression:      No evidence of active cardiopulmonary disease.      Electronically signed by: Santos Ty MD  Date:    08/26/2019  Time:    13:39             Narrative:    EXAMINATION:  XR CHEST 1 VIEW    CLINICAL HISTORY:  Acute chest pain, unspecified    FINDINGS:  Portable chest radiograph at 1329 hours compared to 07/03/2018 shows the cardiomediastinal silhouette and pulmonary vasculature are within normal limits.    The lungs are normally expanded, with no consolidation, pleural effusion, or evidence of pulmonary edema.  Densely calcified granuloma lies in the left upper lobe.  No confluent infiltrates or pneumothorax. There are no significant osseous abnormalities.                                 Medical Decision Making:   History:   Old Medical Records: I decided to obtain old medical records.  Initial Assessment:   44-year-old male presents complaining of burning chest wall pain, pain was present approximately 3-4 days ago and has now  completely resolved.  Patient is currently pain-free.  Patient has normal blood work, no sign of troponin elevation, patient tolerating p.o., patient is perk negative, patient has a heart score of 1 and a PANKAJ risk score of 0.  This place this patient in the low risk category.  Patient is to follow up with PCP in the next 2-3 days for re-evaluation and cautioned to return immediately to the ER for any worsening or for any further concerns.                      Clinical Impression:       ICD-10-CM ICD-9-CM   1. Chest pain R07.9 786.50                                Vinod Giles MD  08/26/19 2242

## 2019-08-26 NOTE — ED NOTES
Pt states he has chest pain that feels like a burning pain that radiates to his left arm. He says it comes and goes but today since it radiated to his arm he got worried and came in. Pain 6/10. Pt is alert and oriented and in no acute distress. Pt is hooked up to the cardiac monitor. Sinus rhythm on the monitor.

## 2019-08-28 ENCOUNTER — OFFICE VISIT (OUTPATIENT)
Dept: FAMILY MEDICINE | Facility: CLINIC | Age: 44
End: 2019-08-28
Payer: MEDICAID

## 2019-08-28 VITALS
HEART RATE: 67 BPM | BODY MASS INDEX: 32.5 KG/M2 | DIASTOLIC BLOOD PRESSURE: 84 MMHG | SYSTOLIC BLOOD PRESSURE: 130 MMHG | TEMPERATURE: 99 F | WEIGHT: 232.13 LBS | HEIGHT: 71 IN | OXYGEN SATURATION: 97 %

## 2019-08-28 DIAGNOSIS — K21.00 GASTROESOPHAGEAL REFLUX DISEASE WITH ESOPHAGITIS: ICD-10-CM

## 2019-08-28 DIAGNOSIS — I10 ESSENTIAL HYPERTENSION: ICD-10-CM

## 2019-08-28 DIAGNOSIS — E66.9 OBESITY (BMI 30-39.9): ICD-10-CM

## 2019-08-28 DIAGNOSIS — R00.2 PALPITATIONS: Primary | Chronic | ICD-10-CM

## 2019-08-28 PROCEDURE — 99214 OFFICE O/P EST MOD 30 MIN: CPT | Mod: S$PBB,,, | Performed by: PHYSICIAN ASSISTANT

## 2019-08-28 PROCEDURE — 99999 PR PBB SHADOW E&M-EST. PATIENT-LVL III: CPT | Mod: PBBFAC,,, | Performed by: PHYSICIAN ASSISTANT

## 2019-08-28 PROCEDURE — 99999 PR PBB SHADOW E&M-EST. PATIENT-LVL III: ICD-10-PCS | Mod: PBBFAC,,, | Performed by: PHYSICIAN ASSISTANT

## 2019-08-28 PROCEDURE — 99214 PR OFFICE/OUTPT VISIT, EST, LEVL IV, 30-39 MIN: ICD-10-PCS | Mod: S$PBB,,, | Performed by: PHYSICIAN ASSISTANT

## 2019-08-28 PROCEDURE — 99213 OFFICE O/P EST LOW 20 MIN: CPT | Mod: PBBFAC,PO | Performed by: PHYSICIAN ASSISTANT

## 2019-08-28 RX ORDER — LOSARTAN POTASSIUM 50 MG/1
50 TABLET ORAL DAILY
Qty: 90 TABLET | Refills: 3
Start: 2019-08-28 | End: 2019-09-13 | Stop reason: DRUGHIGH

## 2019-08-28 NOTE — PROGRESS NOTES
Subjective:       Patient ID: Shelly Gannon Jr. is a 44 y.o. male.    Chief Complaint: Hypertension    HPI   Pt in for f/u on recent flare of chest pain and elevated BP  Dr Bailey recently changed BP med to ACE inhibitor that caused cough  Pt seen in ER and was given losartan 50 mg QD  Pt feeling OK  Pt. Sees Dr Bailey back next wk.  Review of Systems   Constitutional: Negative.  Negative for activity change, appetite change, chills, diaphoresis, fatigue, fever and unexpected weight change.   HENT: Negative.    Eyes: Negative.    Respiratory: Negative.  Negative for cough and shortness of breath.    Cardiovascular: Negative.  Negative for chest pain and leg swelling.   Gastrointestinal: Negative.    Endocrine: Negative.    Genitourinary: Negative.    Musculoskeletal: Negative.    Skin: Negative.  Negative for rash.   Neurological: Negative.        Objective:      Physical Exam   Constitutional: He is oriented to person, place, and time. He appears well-developed and well-nourished. No distress.   HENT:   Head: Normocephalic and atraumatic.   Right Ear: External ear normal.   Left Ear: External ear normal.   Nose: Nose normal.   Mouth/Throat: Oropharynx is clear and moist. No oropharyngeal exudate.   Eyes: Conjunctivae are normal. No scleral icterus.   Neck: Normal range of motion. Neck supple. No tracheal deviation present. No thyromegaly present.   Carotids clear   Cardiovascular: Normal rate, regular rhythm, normal heart sounds and intact distal pulses. Exam reveals no gallop and no friction rub.   No murmur heard.  Pulmonary/Chest: Effort normal and breath sounds normal. No stridor. No respiratory distress. He has no wheezes. He has no rales.   Abdominal: Soft. Bowel sounds are normal. He exhibits no distension and no mass. There is no tenderness. There is no rebound and no guarding. No hernia.   No organomegaly   Musculoskeletal: He exhibits no edema or tenderness.   Lymphadenopathy:     He has no  cervical adenopathy.   Neurological: He is alert and oriented to person, place, and time.   Skin: Skin is warm and dry. No rash noted.   Vitals reviewed.      Assessment:       1. Palpitations    2. Obesity (BMI 30-39.9)    3. Essential hypertension        Plan:       Shelly was seen today for hypertension.    Diagnoses and all orders for this visit:    Palpitations    Obesity (BMI 30-39.9)    Essential hypertension  -     losartan (COZAAR) 50 MG tablet; Take 1 tablet (50 mg total) by mouth once daily.    f/u with cardiology  Discussed otc's  Discussed diet

## 2019-09-13 ENCOUNTER — OFFICE VISIT (OUTPATIENT)
Dept: FAMILY MEDICINE | Facility: CLINIC | Age: 44
End: 2019-09-13
Payer: MEDICAID

## 2019-09-13 VITALS
HEIGHT: 71 IN | WEIGHT: 232.56 LBS | BODY MASS INDEX: 32.56 KG/M2 | DIASTOLIC BLOOD PRESSURE: 82 MMHG | OXYGEN SATURATION: 98 % | TEMPERATURE: 98 F | HEART RATE: 70 BPM | SYSTOLIC BLOOD PRESSURE: 140 MMHG

## 2019-09-13 DIAGNOSIS — K21.00 GASTROESOPHAGEAL REFLUX DISEASE WITH ESOPHAGITIS: ICD-10-CM

## 2019-09-13 DIAGNOSIS — R11.0 NAUSEA IN ADULT: ICD-10-CM

## 2019-09-13 DIAGNOSIS — I10 ESSENTIAL HYPERTENSION: Primary | ICD-10-CM

## 2019-09-13 DIAGNOSIS — E66.9 OBESITY (BMI 30.0-34.9): ICD-10-CM

## 2019-09-13 PROCEDURE — 99999 PR PBB SHADOW E&M-EST. PATIENT-LVL IV: CPT | Mod: PBBFAC,,, | Performed by: NURSE PRACTITIONER

## 2019-09-13 PROCEDURE — S0119 ONDANSETRON 4 MG: HCPCS | Mod: PBBFAC,PO

## 2019-09-13 PROCEDURE — 99214 OFFICE O/P EST MOD 30 MIN: CPT | Mod: PBBFAC,PO | Performed by: NURSE PRACTITIONER

## 2019-09-13 PROCEDURE — 99999 PR PBB SHADOW E&M-EST. PATIENT-LVL IV: ICD-10-PCS | Mod: PBBFAC,,, | Performed by: NURSE PRACTITIONER

## 2019-09-13 PROCEDURE — 99214 OFFICE O/P EST MOD 30 MIN: CPT | Mod: S$PBB,,, | Performed by: NURSE PRACTITIONER

## 2019-09-13 PROCEDURE — 99214 PR OFFICE/OUTPT VISIT, EST, LEVL IV, 30-39 MIN: ICD-10-PCS | Mod: S$PBB,,, | Performed by: NURSE PRACTITIONER

## 2019-09-13 RX ORDER — LOSARTAN POTASSIUM 100 MG/1
100 TABLET ORAL DAILY
Status: ON HOLD | COMMUNITY
Start: 2019-09-12 | End: 2022-01-21 | Stop reason: HOSPADM

## 2019-09-13 RX ORDER — OMEPRAZOLE 40 MG/1
40 CAPSULE, DELAYED RELEASE ORAL DAILY
Qty: 30 CAPSULE | Refills: 11 | Status: SHIPPED | OUTPATIENT
Start: 2019-09-13 | End: 2020-12-01 | Stop reason: SDUPTHER

## 2019-09-13 RX ORDER — ONDANSETRON 4 MG/1
8 TABLET, ORALLY DISINTEGRATING ORAL EVERY 6 HOURS PRN
Qty: 20 TABLET | Refills: 1 | Status: ON HOLD | OUTPATIENT
Start: 2019-09-13 | End: 2022-01-21 | Stop reason: HOSPADM

## 2019-09-13 RX ORDER — ONDANSETRON 4 MG/1
4 TABLET, ORALLY DISINTEGRATING ORAL
Status: COMPLETED | OUTPATIENT
Start: 2019-09-13 | End: 2019-09-13

## 2019-09-13 RX ADMIN — ONDANSETRON 4 MG: 4 TABLET, ORALLY DISINTEGRATING ORAL at 11:09

## 2019-09-13 NOTE — PROGRESS NOTES
2 Patent identifiers used (name and ). Administered 4 mg Ondansetron orally. Patient tolerated well. No adverse reactions noted.

## 2019-09-13 NOTE — PROGRESS NOTES
Subjective:       Patient ID: Shelly Gannon Jr. is a 44 y.o. male.    Chief Complaint: Nausea    Nausea   This is a new problem. The current episode started 1 to 4 weeks ago. The problem occurs intermittently. The problem has been unchanged. Associated symptoms include nausea. Pertinent negatives include no change in bowel habit, chest pain, headaches, rash or vomiting. Exacerbated by: worst in morings. Treatments tried: anacids. The treatment provided mild relief.       Past Medical History:   Diagnosis Date    Allergy     Anxiety     Hypertension     Migraine headache     Migraine syndrome        Review of patient's allergies indicates:   Allergen Reactions    Ace inhibitors Other (See Comments)     cough         Current Outpatient Medications:     aspirin (ECOTRIN) 81 MG EC tablet, Take 81 mg by mouth once daily., Disp: , Rfl:     escitalopram oxalate (LEXAPRO) 10 MG tablet, Take 10 mg by mouth once daily., Disp: , Rfl:     ibuprofen (ADVIL,MOTRIN) 800 MG tablet, Take 1 tablet (800 mg total) by mouth every 6 (six) hours as needed for Pain., Disp: 20 tablet, Rfl: 0    LORazepam (ATIVAN) 1 MG tablet, Take 1 mg by mouth every 12 (twelve) hours as needed for Anxiety., Disp: , Rfl:     losartan (COZAAR) 100 MG tablet, Take 100 mg by mouth once daily. , Disp: , Rfl:     metoprolol succinate (TOPROL-XL) 25 MG 24 hr tablet, Take 25 mg by mouth once daily., Disp: , Rfl:     omeprazole (PRILOSEC) 40 MG capsule, Take 1 capsule (40 mg total) by mouth once daily., Disp: 30 capsule, Rfl: 11    ondansetron (ZOFRAN-ODT) 4 MG TbDL, Take 2 tablets (8 mg total) by mouth every 6 (six) hours as needed., Disp: 20 tablet, Rfl: 1    Review of Systems   Constitutional: Negative for unexpected weight change.   HENT: Negative for trouble swallowing.    Eyes: Negative for visual disturbance.   Respiratory: Negative for shortness of breath.    Cardiovascular: Negative for chest pain, palpitations and leg swelling.  "  Gastrointestinal: Positive for nausea. Negative for blood in stool, change in bowel habit and vomiting.   Genitourinary: Negative for hematuria.   Skin: Negative for rash.   Allergic/Immunologic: Negative for immunocompromised state.   Neurological: Negative for headaches.   Hematological: Does not bruise/bleed easily.   Psychiatric/Behavioral: Negative for agitation. The patient is not nervous/anxious.        Objective:      BP (!) 140/82 (BP Location: Right arm, Patient Position: Sitting, BP Method: Large (Manual))   Pulse 70   Temp 98.3 °F (36.8 °C)   Ht 5' 11" (1.803 m)   Wt 105.5 kg (232 lb 9.4 oz)   SpO2 98%   BMI 32.44 kg/m²   Physical Exam   Constitutional: He is oriented to person, place, and time. He appears well-developed and well-nourished.   Eyes: Pupils are equal, round, and reactive to light. Conjunctivae and EOM are normal.   Neck: Normal range of motion. Neck supple.   Cardiovascular: Normal rate, regular rhythm, normal heart sounds and intact distal pulses.   Pulmonary/Chest: Effort normal and breath sounds normal.   Abdominal: Soft. Bowel sounds are normal.   Musculoskeletal: Normal range of motion.   Neurological: He is alert and oriented to person, place, and time.   Skin: Skin is warm and dry.   Psychiatric: He has a normal mood and affect. His behavior is normal. Judgment and thought content normal.       Assessment:       1. Essential hypertension    2. Nausea in adult    3. Gastroesophageal reflux disease with esophagitis    4. Obesity (BMI 30.0-34.9)        Plan:       Essential hypertension  Patient reports taking first 100 mg dose of medication and was taken at 9 am  Low sodium diet  2 weeks nurse visit  2 week blood pressure log  BP Readings from Last 3 Encounters:   09/13/19 (!) 140/82   08/28/19 130/84   08/26/19 139/87     Nausea in adult  -     ondansetron disintegrating tablet 4 mg  -     Ambulatory referral to Gastroenterology  -     US Abdomen Complete; Future; Expected " "date: 09/13/2019  -     omeprazole (PRILOSEC) 40 MG capsule; Take 1 capsule (40 mg total) by mouth once daily.  Dispense: 30 capsule; Refill: 11  -     ondansetron (ZOFRAN-ODT) 4 MG TbDL; Take 2 tablets (8 mg total) by mouth every 6 (six) hours as needed.  Dispense: 20 tablet; Refill: 1    Gastroesophageal reflux disease with esophagitis  -     omeprazole (PRILOSEC) 40 MG capsule; Take 1 capsule (40 mg total) by mouth once daily.  Dispense: 30 capsule; Refill: 11      Obesity (BMI 30.0-34.9)  Counseled patient on his ideal body weight, health consequences of being obese and current recommendations including weekly exercise and a heart healthy diet.  Current BMI is:Estimated body mass index is 32.44 kg/m² as calculated from the following:    Height as of this encounter: 5' 11" (1.803 m).    Weight as of this encounter: 105.5 kg (232 lb 9.4 oz)..  Patient is aware that ideal BMI < 25 or Weight in (lb) to have BMI = 25: 178.9.        Patient readiness: acceptance and barriers:none    During the course of the visit the patient was educated and counseled about the following:     Hypertension:   Dietary sodium restriction.  Regular aerobic exercise.  Obesity:   General weight loss/lifestyle modification strategies discussed (elicit support from others; identify saboteurs; non-food rewards, etc).  Regular aerobic exercise program discussed.    Goals: Hypertension: Reduce Blood Pressure and Obesity: Reduce calorie intake and BMI    Did patient meet goals/outcomes: No    The following self management tools provided: BP log    Patient Instructions (the written plan) was given to the patient/family.     Time spent with patient: 30 minutes    Barriers to medications present (no )    Adverse reactions to current medications (no)    Over the counter medications reviewed (Yes)        "

## 2019-09-13 NOTE — PATIENT INSTRUCTIONS
Tips to Control Acid Reflux    To control acid reflux, youll need to make some basic diet and lifestyle changes. The simple steps outlined below may be all youll need to ease discomfort.  Watch what you eat  · Avoid fatty foods and spicy foods.  · Eat fewer acidic foods, such as citrus and tomato-based foods. These can increase symptoms.  · Limit drinking alcohol, caffeine, and fizzy beverages. All increase acid reflux.  · Try limiting chocolate, peppermint, and spearmint. These can worsen acid reflux in some people.  Watch when you eat  · Avoid lying down for 3 hours after eating.  · Do not snack before going to bed.  Raise your head  Raising your head and upper body by 4 to 6 inches helps limit reflux when youre lying down. Put blocks under the head of your bed frame to raise it.  Other changes  · Lose weight, if you need to  · Dont exercise near bedtime  · Avoid tight-fitting clothes  · Limit aspirin and ibuprofen  · Stop smoking   Date Last Reviewed: 7/1/2016 © 2000-2017 [employer]. [address] [place] [address]. All rights reserved. This information is not intended as a substitute for professional medical care. Always follow your healthcare professional's instructions.        Medicines for Acid Reflux  Your healthcare provider has told you that you have acid reflux. This condition causes stomach acid to wash up into your throat. For most people, acid reflux is troubling but not dangerous. But left untreated, acid reflux sometimes damages the esophagus. Medicines can help control acid reflux and limit your risk of future problems.  Medicines for acid reflux  Your healthcare provider may prescribe medicine to help treat your acid reflux. Medicine will be based on your symptoms and any test results. Your provider will explain how to take your medicine. You will also be told about possible side effects.  Reducing stomach acid  Your provider may suggest antacids that you can buy  over the counter. Antacids can give fast relief. Or you may be told to take a type of medicine called H2 blockers. These are available over the counter and by prescription (for higher doses).  Blocking stomach acid  In more severe cases, your healthcare provider may suggest stronger medicines such as proton pump inhibitors (PPIs). These keep the stomach from making acid. They are often prescribed for long-term use.  Other medicines  In some cases medicines to reduce or block stomach acid may not work. Then you may be switched to another type of medicine that helps your stomach empty better.     Date Last Reviewed: 10/1/2016  © 7005-8562 [employer]. [address] [place] [address], [place] [address]. All rights reserved. This information is not intended as a substitute for professional medical care. Always follow your healthcare professional's instructions.        GERD (Adult)    The esophagus is a tube that carries food from the mouth to the stomach. A valve at the lower end of the esophagus prevents stomach acid from flowing upward. When this valve doesn't work properly, stomach contents may repeatedly flow back up (reflux) into the esophagus. This is called gastroesophageal reflux disease (GERD). GERD can irritate the esophagus. It can cause problems with swallowing or breathing. In severe cases, GERD can cause recurrent pneumonia or other serious problems.  Symptoms of reflux include burning, pressure or sharp pain in the upper abdomen or mid to lower chest. The pain can spread to the neck, back, or shoulder. There may be belching, an acid taste in the back of the throat, chronic cough, or sore throat or hoarseness. GERD symptoms often occur during the day after a big meal. They can also occur at night when lying down.   Home care  Lifestyle changes can help reduce symptoms. If needed, medicines may be prescribed. Symptoms often improve with treatment, but if treatment is stopped, the symptoms often return  "after a few months. So most persons with GERD will need to continue treatment.  Lifestyle changes  · Limit or avoid fatty, fried, and spicy foods, as well as coffee, chocolate, mint, and foods with high acid content such as tomatoes and citrus fruit and juices (orange, grapefruit, lemon).  · Dont eat large meals, especially at night. Frequent, smaller meals are best. Do not lie down right after eating. And dont eat anything 3 hours before going to bed.  · Avoid drinking alcohol and smoking. As much as possible, stay away from second hand smoke.  · If you are overweight, losing weight will reduce symptoms.   · Avoid wearing tight clothing around your stomach area.  · If your symptoms occur during sleep, use a foam wedge to elevate your upper body (not just your head.) Or, place 4" blocks under the head of your bed.  Medicines  If needed, medicines can help relieve the symptoms of GERD and prevent damage to the esophagus. Discuss a medicine plan with your healthcare provider. This may include one or more of the following medicines:  · Antacids to help neutralize the normal acids in your stomach.  · Acid blockers (H2 blockers) to decrease acid production.  · Acid inhibitors (PPIs) to decrease acid production in a different way than the blockers. They may work better, but can take a little longer to take effect.  Take an antacid 30-60 minutes after eating and at bedtime, but not at the same time as an acid blocker.  Try not to take medicines such as ibuprofen and aspirin. If you are taking aspirin for your heart or other medical reasons, talk to your healthcare provider about stopping it.  Follow-up care  Follow up with your healthcare provider or as advised by our staff.  When to seek medical advice  Call your healthcare provider if any of the following occur:  · Stomach pain gets worse or moves to the lower right abdomen (appendix area)  · Chest pain appears or gets worse, or spreads to the back, neck, shoulder, or " arm  · Frequent vomiting (cant keep down liquids)  · Blood in the stool or vomit (red or black in color)  · Feeling weak or dizzy  · Fever of 100.4ºF (38ºC) or higher, or as directed by your healthcare provider  Date Last Reviewed: 6/23/2015  © 0202-7375 [employer]. [address] [place] [address], [place] [address]. All rights reserved. This information is not intended as a substitute for professional medical care. Always follow your healthcare professional's instructions.

## 2019-09-16 ENCOUNTER — HOSPITAL ENCOUNTER (OUTPATIENT)
Dept: RADIOLOGY | Facility: CLINIC | Age: 44
Discharge: HOME | End: 2019-09-16
Attending: NURSE PRACTITIONER
Payer: MEDICAID

## 2019-09-16 ENCOUNTER — PATIENT MESSAGE (OUTPATIENT)
Dept: FAMILY MEDICINE | Facility: CLINIC | Age: 44
End: 2019-09-16

## 2019-09-16 ENCOUNTER — TELEPHONE (OUTPATIENT)
Dept: FAMILY MEDICINE | Facility: CLINIC | Age: 44
End: 2019-09-16

## 2019-09-16 DIAGNOSIS — I10 ESSENTIAL HYPERTENSION: Primary | ICD-10-CM

## 2019-09-16 DIAGNOSIS — R11.0 NAUSEA IN ADULT: ICD-10-CM

## 2019-09-16 PROCEDURE — 76700 US EXAM ABDOM COMPLETE: CPT | Mod: 26,,, | Performed by: RADIOLOGY

## 2019-09-16 PROCEDURE — 76700 US ABDOMEN COMPLETE: ICD-10-PCS | Mod: 26,,, | Performed by: RADIOLOGY

## 2019-09-16 PROCEDURE — 76700 US EXAM ABDOM COMPLETE: CPT | Mod: TC,PO

## 2019-09-16 NOTE — TELEPHONE ENCOUNTER
----- Message from Jeny Benavidez sent at 9/16/2019  7:22 AM CDT -----  Contact: Self   Patient came in today to drop off a blood pressure log for Mrs. Aranda to look at. Please come to [facility] for .

## 2019-09-17 ENCOUNTER — HOSPITAL ENCOUNTER (OUTPATIENT)
Dept: PREADMISSION TESTING | Facility: HOSPITAL | Age: 44
Discharge: HOME | End: 2019-09-17
Attending: INTERNAL MEDICINE
Payer: MEDICAID

## 2019-09-17 ENCOUNTER — HOSPITAL ENCOUNTER (OUTPATIENT)
Dept: RADIOLOGY | Facility: HOSPITAL | Age: 44
Discharge: HOME | End: 2019-09-17
Attending: INTERNAL MEDICINE
Payer: MEDICAID

## 2019-09-17 VITALS — WEIGHT: 229.06 LBS | BODY MASS INDEX: 32.07 KG/M2 | HEIGHT: 71 IN

## 2019-09-17 DIAGNOSIS — I20.89 ANGINA AT REST: ICD-10-CM

## 2019-09-17 DIAGNOSIS — R06.09 DOE (DYSPNEA ON EXERTION): ICD-10-CM

## 2019-09-17 DIAGNOSIS — Z01.811 PRE-PROCEDURAL RESPIRATORY EXAMINATION: Primary | ICD-10-CM

## 2019-09-17 LAB
ALBUMIN SERPL BCP-MCNC: 4.5 G/DL (ref 3.5–5.2)
ALP SERPL-CCNC: 47 U/L (ref 55–135)
ALT SERPL W/O P-5'-P-CCNC: 22 U/L (ref 10–44)
ANION GAP SERPL CALC-SCNC: 7 MMOL/L (ref 8–16)
APTT PPP: 29.7 SEC (ref 26.2–34.7)
AST SERPL-CCNC: 18 U/L (ref 10–40)
BASOPHILS # BLD AUTO: 0.03 K/UL (ref 0–0.2)
BASOPHILS NFR BLD: 0.8 % (ref 0–1.9)
BILIRUB SERPL-MCNC: 0.9 MG/DL (ref 0.1–1)
BUN SERPL-MCNC: 14 MG/DL (ref 6–20)
CALCIUM SERPL-MCNC: 9.2 MG/DL (ref 8.7–10.5)
CHLORIDE SERPL-SCNC: 103 MMOL/L (ref 95–110)
CO2 SERPL-SCNC: 27 MMOL/L (ref 23–29)
CREAT SERPL-MCNC: 1.3 MG/DL (ref 0.5–1.4)
DIFFERENTIAL METHOD: NORMAL
EOSINOPHIL # BLD AUTO: 0 K/UL (ref 0–0.5)
EOSINOPHIL NFR BLD: 1 % (ref 0–8)
ERYTHROCYTE [DISTWIDTH] IN BLOOD BY AUTOMATED COUNT: 13.5 % (ref 11.5–14.5)
EST. GFR  (AFRICAN AMERICAN): >60 ML/MIN/1.73 M^2
EST. GFR  (NON AFRICAN AMERICAN): >60 ML/MIN/1.73 M^2
GLUCOSE SERPL-MCNC: 92 MG/DL (ref 70–110)
HCT VFR BLD AUTO: 48.5 % (ref 40–54)
HGB BLD-MCNC: 16.3 G/DL (ref 14–18)
IMM GRANULOCYTES # BLD AUTO: 0.01 K/UL (ref 0–0.04)
IMM GRANULOCYTES NFR BLD AUTO: 0.3 % (ref 0–0.5)
INR PPP: 1
LYMPHOCYTES # BLD AUTO: 1.1 K/UL (ref 1–4.8)
LYMPHOCYTES NFR BLD: 28.3 % (ref 18–48)
MAGNESIUM SERPL-MCNC: 2.1 MG/DL (ref 1.6–2.6)
MCH RBC QN AUTO: 29.8 PG (ref 27–31)
MCHC RBC AUTO-ENTMCNC: 33.6 G/DL (ref 32–36)
MCV RBC AUTO: 89 FL (ref 82–98)
MONOCYTES # BLD AUTO: 0.4 K/UL (ref 0.3–1)
MONOCYTES NFR BLD: 10.5 % (ref 4–15)
NEUTROPHILS # BLD AUTO: 2.4 K/UL (ref 1.8–7.7)
NEUTROPHILS NFR BLD: 59.1 % (ref 38–73)
NRBC BLD-RTO: 0 /100 WBC
PLATELET # BLD AUTO: 157 K/UL (ref 150–350)
PMV BLD AUTO: 12 FL (ref 9.2–12.9)
POTASSIUM SERPL-SCNC: 4 MMOL/L (ref 3.5–5.1)
PROT SERPL-MCNC: 7.8 G/DL (ref 6–8.4)
PROTHROMBIN TIME: 12.7 SEC (ref 11.7–14)
RBC # BLD AUTO: 5.47 M/UL (ref 4.6–6.2)
SODIUM SERPL-SCNC: 137 MMOL/L (ref 136–145)
WBC # BLD AUTO: 4 K/UL (ref 3.9–12.7)

## 2019-09-17 PROCEDURE — 85610 PROTHROMBIN TIME: CPT

## 2019-09-17 PROCEDURE — 36415 COLL VENOUS BLD VENIPUNCTURE: CPT

## 2019-09-17 PROCEDURE — 85025 COMPLETE CBC W/AUTO DIFF WBC: CPT

## 2019-09-17 PROCEDURE — 80053 COMPREHEN METABOLIC PANEL: CPT

## 2019-09-17 PROCEDURE — 83735 ASSAY OF MAGNESIUM: CPT

## 2019-09-17 PROCEDURE — 85730 THROMBOPLASTIN TIME PARTIAL: CPT

## 2019-09-17 PROCEDURE — 71046 X-RAY EXAM CHEST 2 VIEWS: CPT | Mod: TC

## 2019-09-17 RX ORDER — ESCITALOPRAM OXALATE 10 MG/1
10 TABLET ORAL DAILY
COMMUNITY
End: 2020-12-09 | Stop reason: SDUPTHER

## 2019-09-17 RX ORDER — METOPROLOL SUCCINATE 25 MG/1
25 TABLET, EXTENDED RELEASE ORAL DAILY
COMMUNITY
End: 2021-01-25 | Stop reason: SDUPTHER

## 2019-09-17 RX ORDER — ASPIRIN 81 MG/1
81 TABLET ORAL DAILY
COMMUNITY

## 2019-09-17 RX ORDER — LORAZEPAM 1 MG/1
1 TABLET ORAL EVERY 12 HOURS PRN
Status: ON HOLD | COMMUNITY
End: 2022-01-21 | Stop reason: HOSPADM

## 2019-09-17 NOTE — DISCHARGE INSTRUCTIONS
 Nothing to eat or drink after midnight the night before your procedure.   Do not take any medications the morning of your procedure   Bring all your medications with you in the original pill bottles from pharmacy.   Do your Hibiclens wash the night before and morning of your procedure.   Make arrangements for someone you know to drive you home after your procedure. Taxi and Uber are not acceptable.

## 2019-09-18 RX ORDER — HYDROCHLOROTHIAZIDE 25 MG/1
25 TABLET ORAL DAILY
Qty: 30 TABLET | Refills: 11 | Status: SHIPPED | OUTPATIENT
Start: 2019-09-18 | End: 2019-09-18

## 2019-09-18 NOTE — TELEPHONE ENCOUNTER
Please inform patient that the blood pressure are much better. I would like to see him at 120/80., so I added a diuretic to his medication regimen. Continue to take BP daily and send reading to me in 2 weeks.

## 2019-09-18 NOTE — TELEPHONE ENCOUNTER
Spoke with patient over the phone. Cardiology started him on metoprolol and ativan yesterday. He is scheduled for a angiogram on tomorrow. I canceled the HCTZ.

## 2019-09-19 ENCOUNTER — HOSPITAL ENCOUNTER (OUTPATIENT)
Facility: HOSPITAL | Age: 44
Discharge: HOME | End: 2019-09-19
Attending: INTERNAL MEDICINE | Admitting: INTERNAL MEDICINE
Payer: MEDICAID

## 2019-09-19 VITALS
TEMPERATURE: 98 F | DIASTOLIC BLOOD PRESSURE: 82 MMHG | RESPIRATION RATE: 25 BRPM | SYSTOLIC BLOOD PRESSURE: 121 MMHG | OXYGEN SATURATION: 97 % | HEART RATE: 64 BPM

## 2019-09-19 DIAGNOSIS — R94.39 ABNORMAL STRESS TEST: ICD-10-CM

## 2019-09-19 DIAGNOSIS — R07.89 OTHER CHEST PAIN: ICD-10-CM

## 2019-09-19 PROCEDURE — C1894 INTRO/SHEATH, NON-LASER: HCPCS | Performed by: INTERNAL MEDICINE

## 2019-09-19 PROCEDURE — 93458 L HRT ARTERY/VENTRICLE ANGIO: CPT

## 2019-09-19 PROCEDURE — 63600175 PHARM REV CODE 636 W HCPCS: Performed by: INTERNAL MEDICINE

## 2019-09-19 PROCEDURE — C1887 CATHETER, GUIDING: HCPCS | Performed by: INTERNAL MEDICINE

## 2019-09-19 PROCEDURE — 25000003 PHARM REV CODE 250: Performed by: INTERNAL MEDICINE

## 2019-09-19 RX ORDER — LIDOCAINE HYDROCHLORIDE 10 MG/ML
INJECTION, SOLUTION EPIDURAL; INFILTRATION; INTRACAUDAL; PERINEURAL
Status: DISCONTINUED | OUTPATIENT
Start: 2019-09-19 | End: 2019-09-19 | Stop reason: HOSPADM

## 2019-09-19 RX ORDER — SODIUM CHLORIDE 9 MG/ML
INJECTION, SOLUTION INTRAVENOUS CONTINUOUS
Status: ACTIVE | OUTPATIENT
Start: 2019-09-19 | End: 2019-09-19

## 2019-09-19 RX ORDER — NITROGLYCERIN 5 MG/ML
INJECTION, SOLUTION INTRAVENOUS
Status: DISCONTINUED | OUTPATIENT
Start: 2019-09-19 | End: 2019-09-19 | Stop reason: HOSPADM

## 2019-09-19 RX ORDER — FENTANYL CITRATE 50 UG/ML
INJECTION, SOLUTION INTRAMUSCULAR; INTRAVENOUS
Status: DISCONTINUED | OUTPATIENT
Start: 2019-09-19 | End: 2019-09-19 | Stop reason: HOSPADM

## 2019-09-19 RX ORDER — MIDAZOLAM HYDROCHLORIDE 1 MG/ML
INJECTION INTRAMUSCULAR; INTRAVENOUS
Status: DISCONTINUED | OUTPATIENT
Start: 2019-09-19 | End: 2019-09-19 | Stop reason: HOSPADM

## 2019-09-19 RX ORDER — VERAPAMIL HYDROCHLORIDE 2.5 MG/ML
INJECTION, SOLUTION INTRAVENOUS
Status: DISCONTINUED | OUTPATIENT
Start: 2019-09-19 | End: 2019-09-19 | Stop reason: HOSPADM

## 2019-09-19 RX ORDER — SODIUM CHLORIDE 9 MG/ML
INJECTION, SOLUTION INTRAVENOUS CONTINUOUS
Status: ACTIVE | OUTPATIENT
Start: 2019-09-19

## 2019-09-19 RX ORDER — HEPARIN SODIUM 10000 [USP'U]/ML
INJECTION, SOLUTION INTRAVENOUS; SUBCUTANEOUS
Status: DISCONTINUED | OUTPATIENT
Start: 2019-09-19 | End: 2019-09-19 | Stop reason: HOSPADM

## 2019-09-19 RX ORDER — METOPROLOL TARTRATE 1 MG/ML
INJECTION, SOLUTION INTRAVENOUS
Status: DISCONTINUED | OUTPATIENT
Start: 2019-09-19 | End: 2019-09-19 | Stop reason: HOSPADM

## 2019-09-19 RX ORDER — PROMETHAZINE HYDROCHLORIDE 25 MG/ML
INJECTION, SOLUTION INTRAMUSCULAR; INTRAVENOUS
Status: DISCONTINUED | OUTPATIENT
Start: 2019-09-19 | End: 2019-09-19 | Stop reason: HOSPADM

## 2019-09-19 RX ADMIN — SODIUM CHLORIDE: 0.9 INJECTION, SOLUTION INTRAVENOUS at 09:09

## 2019-09-19 NOTE — DISCHARGE SUMMARY
[facility]  Cardiology  Discharge Summary      Patient Name: Shelly Gannon Jr.  MRN: [id number]  Admission Date: 9/19/2019  Hospital Length of Stay: 0 days  Discharge Date and Time:  09/19/2019 12:11 PM  Attending Physician: Ronnie Bailey MD  Discharging Provider: Ronnie Bailey MD  Primary Care Physician: Steven Alba MD    HPI: 45 y/o seen in clinic complaining of substernal chest pain worsened for angina.  He underwent a stress test that was abnormal so he was therefore scheduled for diagnostic left heart catheterization as an outpatient.    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  He had normal left ventricular systolic and diastolic function with an EF of 65%.  He tolerated procedure well and was stable and ready for discharge after 2 hr of recovery in the [facility].    Consults: none    Significant Diagnostic Studies: Angiography: see report    Pending Diagnostic Studies:     None          Final Active Diagnoses:    Diagnosis Date Noted POA    Abnormal stress test [R94.39] 09/19/2019 Yes    Other chest pain [R07.89] 09/19/2019 Unknown      Problems Resolved During this Admission:       Discharged Condition: good    Follow Up:  Follow-up Information     Ronnie Bailey MD In 1 week.    Specialty:  Cardiology  Contact information:  [address] [place] [address]  2ND FLOOR  [facility] [address]  [phone]                 Patient Instructions:      Call MD for:  severe uncontrolled pain     Call MD for:  redness, tenderness, or signs of  infection (pain, swelling, redness, odor or green/yellow discharge around incision site)     Remove dressing in 24 hours     Activity as tolerated     Medications:  Reconciled Home Medications:      Medication List      CONTINUE taking these medications    aspirin 81 MG EC tablet  Commonly known as:  ECOTRIN  Take 81 mg by mouth once daily.     escitalopram oxalate 10 MG tablet  Commonly known as:  LEXAPRO  Take 10 mg by mouth once daily.     ibuprofen 800 MG tablet  Commonly known as:  ADVIL,MOTRIN  Take 1 tablet (800 mg total) by mouth every 6 (six) hours as needed for Pain.     LORazepam 1 MG tablet  Commonly known as:  ATIVAN  Take 1 mg by mouth every 12 (twelve) hours as needed for Anxiety.     losartan 100 MG tablet  Commonly known as:  COZAAR  Take 100 mg by mouth once daily.     metoprolol succinate 25 MG 24 hr tablet  Commonly known as:  TOPROL-XL  Take 25 mg by mouth once daily.     omeprazole 40 MG capsule  Commonly known as:  PRILOSEC  Take 1 capsule (40 mg total) by mouth once daily.     ondansetron 4 MG Tbdl  Commonly known as:  ZOFRAN-ODT  Take 2 tablets (8 mg total) by mouth every 6 (six) hours as needed.            Time spent on the discharge of patient: <30 minutes    Ronnie Bailey MD  Cardiology  [facility]

## 2019-09-19 NOTE — Clinical Note
Catheter is repositioned to the ostium   left main. Angiography performed of the left coronary arteries in multiple views. Angiography performed via power injection with .

## 2020-05-05 ENCOUNTER — PATIENT MESSAGE (OUTPATIENT)
Dept: ADMINISTRATIVE | Facility: HOSPITAL | Age: 45
End: 2020-05-05

## 2020-12-01 DIAGNOSIS — K21.00 GASTROESOPHAGEAL REFLUX DISEASE WITH ESOPHAGITIS: ICD-10-CM

## 2020-12-01 DIAGNOSIS — R11.0 NAUSEA IN ADULT: ICD-10-CM

## 2020-12-01 NOTE — TELEPHONE ENCOUNTER
[email or web address] Omeprazole.  Please advise.       LOV--9-13-19   FOV-- None Noted

## 2020-12-02 RX ORDER — OMEPRAZOLE 40 MG/1
40 CAPSULE, DELAYED RELEASE ORAL DAILY
Qty: 30 CAPSULE | Refills: 0 | Status: SHIPPED | OUTPATIENT
Start: 2020-12-02 | End: 2020-12-09

## 2020-12-09 ENCOUNTER — LAB VISIT (OUTPATIENT)
Dept: LAB | Facility: HOSPITAL | Age: 45
End: 2020-12-09
Attending: NURSE PRACTITIONER
Payer: MEDICAID

## 2020-12-09 ENCOUNTER — OFFICE VISIT (OUTPATIENT)
Dept: FAMILY MEDICINE | Facility: CLINIC | Age: 45
End: 2020-12-09
Payer: MEDICAID

## 2020-12-09 VITALS
HEIGHT: 71 IN | SYSTOLIC BLOOD PRESSURE: 124 MMHG | TEMPERATURE: 97 F | BODY MASS INDEX: 33.21 KG/M2 | DIASTOLIC BLOOD PRESSURE: 74 MMHG | OXYGEN SATURATION: 97 % | HEART RATE: 66 BPM | WEIGHT: 237.19 LBS

## 2020-12-09 DIAGNOSIS — I10 ESSENTIAL HYPERTENSION: ICD-10-CM

## 2020-12-09 DIAGNOSIS — Z00.00 ANNUAL PHYSICAL EXAM: ICD-10-CM

## 2020-12-09 DIAGNOSIS — Z00.00 ANNUAL PHYSICAL EXAM: Primary | ICD-10-CM

## 2020-12-09 DIAGNOSIS — K21.00 GASTROESOPHAGEAL REFLUX DISEASE WITH ESOPHAGITIS WITHOUT HEMORRHAGE: ICD-10-CM

## 2020-12-09 DIAGNOSIS — F41.9 ANXIETY: ICD-10-CM

## 2020-12-09 DIAGNOSIS — E66.9 OBESITY (BMI 30.0-34.9): ICD-10-CM

## 2020-12-09 LAB
ALBUMIN SERPL BCP-MCNC: 4.1 G/DL (ref 3.5–5.2)
ALP SERPL-CCNC: 56 U/L (ref 55–135)
ALT SERPL W/O P-5'-P-CCNC: 22 U/L (ref 10–44)
ANION GAP SERPL CALC-SCNC: 7 MMOL/L (ref 8–16)
AST SERPL-CCNC: 18 U/L (ref 10–40)
BASOPHILS # BLD AUTO: 0.04 K/UL (ref 0–0.2)
BASOPHILS NFR BLD: 1.2 % (ref 0–1.9)
BILIRUB SERPL-MCNC: 0.4 MG/DL (ref 0.1–1)
BUN SERPL-MCNC: 11 MG/DL (ref 6–20)
CALCIUM SERPL-MCNC: 9.3 MG/DL (ref 8.7–10.5)
CHLORIDE SERPL-SCNC: 105 MMOL/L (ref 95–110)
CHOLEST SERPL-MCNC: 203 MG/DL (ref 120–199)
CHOLEST/HDLC SERPL: 6.5 {RATIO} (ref 2–5)
CO2 SERPL-SCNC: 30 MMOL/L (ref 23–29)
CREAT SERPL-MCNC: 1.1 MG/DL (ref 0.5–1.4)
DIFFERENTIAL METHOD: ABNORMAL
EOSINOPHIL # BLD AUTO: 0.1 K/UL (ref 0–0.5)
EOSINOPHIL NFR BLD: 2.9 % (ref 0–8)
ERYTHROCYTE [DISTWIDTH] IN BLOOD BY AUTOMATED COUNT: 12.7 % (ref 11.5–14.5)
EST. GFR  (AFRICAN AMERICAN): >60 ML/MIN/1.73 M^2
EST. GFR  (NON AFRICAN AMERICAN): >60 ML/MIN/1.73 M^2
GLUCOSE SERPL-MCNC: 91 MG/DL (ref 70–110)
HCT VFR BLD AUTO: 46.9 % (ref 40–54)
HDLC SERPL-MCNC: 31 MG/DL (ref 40–75)
HDLC SERPL: 15.3 % (ref 20–50)
HGB BLD-MCNC: 15.8 G/DL (ref 14–18)
IMM GRANULOCYTES # BLD AUTO: 0 K/UL (ref 0–0.04)
IMM GRANULOCYTES NFR BLD AUTO: 0 % (ref 0–0.5)
LDLC SERPL CALC-MCNC: 128 MG/DL (ref 63–159)
LYMPHOCYTES # BLD AUTO: 1.3 K/UL (ref 1–4.8)
LYMPHOCYTES NFR BLD: 39.3 % (ref 18–48)
MCH RBC QN AUTO: 30.3 PG (ref 27–31)
MCHC RBC AUTO-ENTMCNC: 33.7 G/DL (ref 32–36)
MCV RBC AUTO: 90 FL (ref 82–98)
MONOCYTES # BLD AUTO: 0.3 K/UL (ref 0.3–1)
MONOCYTES NFR BLD: 10 % (ref 4–15)
NEUTROPHILS # BLD AUTO: 1.6 K/UL (ref 1.8–7.7)
NEUTROPHILS NFR BLD: 46.6 % (ref 38–73)
NONHDLC SERPL-MCNC: 172 MG/DL
NRBC BLD-RTO: 0 /100 WBC
PLATELET # BLD AUTO: 161 K/UL (ref 150–350)
PMV BLD AUTO: 12 FL (ref 9.2–12.9)
POTASSIUM SERPL-SCNC: 3.8 MMOL/L (ref 3.5–5.1)
PROT SERPL-MCNC: 7.5 G/DL (ref 6–8.4)
RBC # BLD AUTO: 5.22 M/UL (ref 4.6–6.2)
SODIUM SERPL-SCNC: 142 MMOL/L (ref 136–145)
TRIGL SERPL-MCNC: 220 MG/DL (ref 30–150)
WBC # BLD AUTO: 3.41 K/UL (ref 3.9–12.7)

## 2020-12-09 PROCEDURE — 99214 PR OFFICE/OUTPT VISIT, EST, LEVL IV, 30-39 MIN: ICD-10-PCS | Mod: S$PBB,,, | Performed by: NURSE PRACTITIONER

## 2020-12-09 PROCEDURE — 99999 PR PBB SHADOW E&M-EST. PATIENT-LVL IV: ICD-10-PCS | Mod: PBBFAC,,, | Performed by: NURSE PRACTITIONER

## 2020-12-09 PROCEDURE — 86803 HEPATITIS C AB TEST: CPT

## 2020-12-09 PROCEDURE — 85025 COMPLETE CBC W/AUTO DIFF WBC: CPT

## 2020-12-09 PROCEDURE — 99214 OFFICE O/P EST MOD 30 MIN: CPT | Mod: PBBFAC,PO | Performed by: NURSE PRACTITIONER

## 2020-12-09 PROCEDURE — 99999 PR PBB SHADOW E&M-EST. PATIENT-LVL IV: CPT | Mod: PBBFAC,,, | Performed by: NURSE PRACTITIONER

## 2020-12-09 PROCEDURE — 80053 COMPREHEN METABOLIC PANEL: CPT

## 2020-12-09 PROCEDURE — 80061 LIPID PANEL: CPT

## 2020-12-09 PROCEDURE — 99214 OFFICE O/P EST MOD 30 MIN: CPT | Mod: S$PBB,,, | Performed by: NURSE PRACTITIONER

## 2020-12-09 PROCEDURE — 36415 COLL VENOUS BLD VENIPUNCTURE: CPT

## 2020-12-09 RX ORDER — ESCITALOPRAM OXALATE 10 MG/1
10 TABLET ORAL DAILY
Qty: 90 TABLET | Refills: 0 | Status: ON HOLD | OUTPATIENT
Start: 2020-12-09 | End: 2022-01-21 | Stop reason: HOSPADM

## 2020-12-09 RX ORDER — OMEPRAZOLE 40 MG/1
40 CAPSULE, DELAYED RELEASE ORAL
Qty: 180 CAPSULE | Refills: 3 | Status: SHIPPED | OUTPATIENT
Start: 2020-12-09 | End: 2022-01-07 | Stop reason: SDUPTHER

## 2020-12-09 NOTE — PATIENT INSTRUCTIONS
What is High Blood Pressure?  High blood pressure (also called hypertension) is known as the silent killer. This is because most of the time it doesnt cause symptoms. In fact, many people dont know they have it until other problems develop. In most cases, high blood pressure cant be cured. Its a disease that often requires lifelong treatment. The good news is that it can be managed.  Understanding blood pressure  The circulatory system is made up of the heart and blood vessels that carry blood through the body. Your heart is the pump for this system. With each heartbeat (contraction), the heart sends blood out through large blood vessels called arteries. Blood pressure is a measure of how hard the moving blood pushes against the walls of the arteries.  High blood pressure can harm your health  In a healthy blood vessel, the blood moves smoothly through the vessel and puts normal pressure on the vessel walls.       High blood pressure occurs when blood pushes too hard against artery walls. This causes damage to the artery walls and then the formation of scar tissue as it heals. This makes the arteries stiff and weak. Plaque sticks to the scarred tissue narrowing and hardening the arteries. High blood pressure also causes your heart to work harder to get blood out to the body. High blood pressure raises your risk of heart attack, also known as acute myocardial infarction, or AMI, and stroke. It can also lead to kidney disease, and blindness.  Measuring blood pressure  An example of a blood pressure measurement is 120/70 (120 over 70). The top number is the pressure of blood against the artery walls during a heartbeat (systolic). The bottom number is the pressure of blood against artery walls between heartbeats (diastolic). Talk with your healthcare provider to find out what your blood pressure goals should be.   Controlling blood pressure  If your blood pressure is too high, work with your doctor on a plan for  "lowering it. Below are steps you can take that will help lower your blood pressure.  · Choose heart-healthy foods. Eating healthier meals helps you control your blood pressure. Ask your doctor about the DASH eating plan. This plan helps reduce blood pressure by limiting the amount of sodium (salt) you have in your diet. DASH also encourages eating plenty of fruits and vegetables, low-fat or non-fat dairy, whole-grains, and foods high in fiber, and low in fat.  · Reduce sodium. Reducing sodium in your diet reduces fluid retention. Fluid retention caused by too much salt increases blood volume and blood pressure. The American Heart Associations (AHA) "ideal" sodium intake recommendation is 1,500 milligrams per day.  However, since American's eat so much salt, the AHA says a positive change can occur by cutting back to even 2,400 milligrams of sodium a day.  · Maintain a healthy weight. Being overweight makes you more likely to have high blood pressure. Losing excess weight helps lower blood pressure.  · Exercise regularly. Daily exercise helps your heart and blood vessels work better and stay healthier. It can help lower your blood pressure.  · Stop smoking. Smoking increases blood pressure and damages blood vessels.  · Limit alcohol. Drinking too much alcohol can raise blood pressure. Men should have no more than 2 drinks a day. Women should have no more than 1. (A drink is equal to 1 beer, or a small glass of wine, or a shot of liquor.)  · Control stress. Stress makes your heart work harder and beat faster. Controlling stress helps you control your blood pressure.  Facts about high blood pressure  · Feeling OK does not mean that blood pressure is under control. Likewise, feeling bad doesnt mean its out of control. The only way to know for sure is to check your pressure regularly.  · Medicine is only one part of controlling high blood pressure. You also need to manage your weight, get regular exercise, and adjust " your eating habits.  · High blood pressure is usually a lifelong problem. But it can be controlled with healthy lifestyle changes and medicine.  · Hypertension is not the same as stress. Although stress may be a factor in high blood pressure, its only one part of the story.  · Blood pressure medicines need to be taken every day. Stopping suddenly may cause a dangerous increase in pressure.   Date Last Reviewed: 4/27/2016  © 7576-6523 [employer]. [address] [place] [address], [place] [address]. All rights reserved. This information is not intended as a substitute for professional medical care. Always follow your healthcare professional's instructions.

## 2020-12-09 NOTE — PROGRESS NOTES
Subjective:       Patient ID: Ezkyra Gannon Jr. is a 45 y.o. male.    Chief Complaint: Annual Exam    HPI    Patient presents for Annual visit. He is due for labs. He refused the flu shot. He complaints of GI reflux. In Sep 2019 abnormal stress test-followed by Cardiology . patient had an heart cath in 9/19/20. 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  He had normal left ventricular systolic and diastolic function with an EF of 65%. Chest pain is possible related to anxiety patient on Lexapro. Patient reports he has not taken the Metoprolol, Losartan and Ativan for 3 months. Patient BP today is 127/74.         The ASCVD Risk score (Javier GAMALIEL Jr., et al., 2013) failed to calculate for the following reasons:    Cannot find a previous HDL lab    Cannot find a previous total cholesterol lab  Past Medical History:   Diagnosis Date    Allergy     Anxiety     Hypertension     Migraine headache     Migraine syndrome        Review of patient's allergies indicates:   Allergen Reactions    Ace inhibitors Other (See Comments)     cough         Current Outpatient Medications:     escitalopram oxalate (LEXAPRO) 10 MG tablet, Take 1 tablet (10 mg total) by mouth once daily., Disp: 90 tablet, Rfl: 0    ibuprofen (ADVIL,MOTRIN) 800 MG tablet, Take 1 tablet (800 mg total) by mouth every 6 (six) hours as needed for Pain., Disp: 20 tablet, Rfl: 0    LORazepam (ATIVAN) 1 MG tablet, Take 1 mg by mouth every 12 (twelve) hours as needed for Anxiety., Disp: , Rfl:     losartan (COZAAR) 100 MG tablet, Take 100 mg by mouth once daily. , Disp: , Rfl:     metoprolol succinate (TOPROL-XL) 25 MG 24 hr tablet, Take 25 mg by mouth once daily., Disp: , Rfl:     ondansetron (ZOFRAN-ODT) 4 MG TbDL, Take 2 tablets (8 mg total) by mouth every 6 (six) hours as needed., Disp: 20 tablet, Rfl: 1    aspirin  "(ECOTRIN) 81 MG EC tablet, Take 81 mg by mouth once daily., Disp: , Rfl:     omeprazole (PRILOSEC) 40 MG capsule, Take 1 capsule (40 mg total) by mouth 2 (two) times daily before meals., Disp: 180 capsule, Rfl: 3  No current facility-administered medications for this visit.     Facility-Administered Medications Ordered in Other Visits:     0.9%  NaCl infusion, , Intravenous, Continuous, Ronnie Bailey MD, Last Rate: 75 mL/hr at 09/19/19 0930    Review of Systems   Constitutional: Negative for unexpected weight change.   HENT: Negative for trouble swallowing.    Eyes: Negative for visual disturbance.   Respiratory: Negative for shortness of breath.    Cardiovascular: Negative for chest pain, palpitations and leg swelling.   Gastrointestinal: Negative for blood in stool.   Genitourinary: Negative for hematuria.   Skin: Negative for rash.   Allergic/Immunologic: Negative for immunocompromised state.   Neurological: Negative for headaches.   Hematological: Does not bruise/bleed easily.   Psychiatric/Behavioral: Negative for agitation. The patient is not nervous/anxious.        Objective:      /74 (BP Location: Right arm, Patient Position: Sitting, BP Method: Large (Manual))   Pulse 66   Temp 96.5 °F (35.8 °C) (Temporal)   Ht 5' 11" (1.803 m)   Wt 107.6 kg (237 lb 3.4 oz)   SpO2 97%   BMI 33.08 kg/m²   Physical Exam  Constitutional:       Appearance: He is well-developed.   Eyes:      Conjunctiva/sclera: Conjunctivae normal.      Pupils: Pupils are equal, round, and reactive to light.   Neck:      Musculoskeletal: Normal range of motion and neck supple.   Cardiovascular:      Rate and Rhythm: Normal rate and regular rhythm.      Heart sounds: Normal heart sounds.   Pulmonary:      Effort: Pulmonary effort is normal.      Breath sounds: Normal breath sounds.   Abdominal:      General: Bowel sounds are normal.      Palpations: Abdomen is soft.   Musculoskeletal: Normal range of motion.   Skin:     General: " "Skin is warm and dry.   Neurological:      Mental Status: He is alert and oriented to person, place, and time.   Psychiatric:         Behavior: Behavior normal.         Thought Content: Thought content normal.         Judgment: Judgment normal.         Assessment:       1. Annual physical exam    2. Gastroesophageal reflux disease with esophagitis without hemorrhage    3. Essential hypertension    4. Anxiety    5. Obesity (BMI 30.0-34.9)        Plan:       Annual physical exam  -     Comprehensive Metabolic Panel; Future; Expected date: 12/09/2020  -     Lipid Panel; Future; Expected date: 12/09/2020  -     CBC W/ AUTO DIFFERENTIAL; Future; Expected date: 12/09/2020  -     Hepatitis C Antibody; Future; Expected date: 12/09/2020    Gastroesophageal reflux disease with esophagitis without hemorrhage  -     omeprazole (PRILOSEC) 40 MG capsule; Take 1 capsule (40 mg total) by mouth 2 (two) times daily before meals.  Dispense: 180 capsule; Refill: 3    Essential hypertension  -     Comprehensive Metabolic Panel; Future; Expected date: 12/09/2020  -     Lipid Panel; Future; Expected date: 12/09/2020  -     CBC W/ AUTO DIFFERENTIAL; Future; Expected date: 12/09/2020  One week BP log  Low sodium diet  Patient reports he stop his BP medication because it made him "feel bad"  BP Readings from Last 3 Encounters:   12/09/20 124/74   09/19/19 121/82   09/13/19 (!) 140/82     Anxiety  -     escitalopram oxalate (LEXAPRO) 10 MG tablet; Take 1 tablet (10 mg total) by mouth once daily.  Dispense: 90 tablet; Refill: 0    Obesity (BMI 30.0-34.9)  Counseled patient on his ideal body weight, health consequences of being obese and current recommendations including weekly exercise and a heart healthy diet.  Current BMI is:Estimated body mass index is 33.08 kg/m² as calculated from the following:    Height as of this encounter: 5' 11" (1.803 m).    Weight as of this encounter: 107.6 kg (237 lb 3.4 oz)..  Patient is aware that ideal BMI < " 25 or Weight in (lb) to have BMI = 25: 178.9.            Patient readiness: acceptance and barriers:none    During the course of the visit the patient was educated and counseled about the following:     Hypertension:   Dietary sodium restriction.  Regular aerobic exercise.  Obesity:   General weight loss/lifestyle modification strategies discussed (elicit support from others; identify saboteurs; non-food rewards, etc).  Regular aerobic exercise program discussed.    Goals: Hypertension: Reduce Blood Pressure and Obesity: Reduce calorie intake and BMI    Did patient meet goals/outcomes: No    The following self management tools provided: blood pressure log    Patient Instructions (the written plan) was given to the patient/family.     Time spent with patient: 15 minutes    Barriers to medications present (no )    Adverse reactions to current medications (no)    Over the counter medications reviewed (Yes)

## 2020-12-10 LAB — HCV AB SERPL QL IA: NEGATIVE

## 2020-12-11 ENCOUNTER — PATIENT MESSAGE (OUTPATIENT)
Dept: OTHER | Facility: OTHER | Age: 45
End: 2020-12-11

## 2020-12-21 ENCOUNTER — TELEPHONE (OUTPATIENT)
Dept: FAMILY MEDICINE | Facility: CLINIC | Age: 45
End: 2020-12-21

## 2020-12-21 NOTE — TELEPHONE ENCOUNTER
----- Message from Krystin Aranda NP sent at 12/21/2020  9:05 AM CST -----  Please inform patient that the cholesterol levels are elevated-I recommend a heart healthy diet rich in fiber, fresh vegetables and fruit and low in saturated fats (fried foods, red meat, etc.).  I also recommend regular exercise including a minimum of 150 minutes of cardio exercise per week and 2-30 minute workouts of strength training like light weights, yoga, pilates, etc.  You should work toward a body mass index of < 25. The other labs are normal or in acceptable range.

## 2021-01-23 ENCOUNTER — HOSPITAL ENCOUNTER (EMERGENCY)
Facility: HOSPITAL | Age: 46
Discharge: HOME | End: 2021-01-23
Attending: EMERGENCY MEDICINE
Payer: MEDICAID

## 2021-01-23 VITALS
TEMPERATURE: 98 F | HEIGHT: 71 IN | WEIGHT: 237 LBS | DIASTOLIC BLOOD PRESSURE: 97 MMHG | RESPIRATION RATE: 20 BRPM | SYSTOLIC BLOOD PRESSURE: 149 MMHG | BODY MASS INDEX: 33.18 KG/M2 | OXYGEN SATURATION: 96 % | HEART RATE: 61 BPM

## 2021-01-23 DIAGNOSIS — G43.909 MIGRAINE WITHOUT STATUS MIGRAINOSUS, NOT INTRACTABLE, UNSPECIFIED MIGRAINE TYPE: Primary | ICD-10-CM

## 2021-01-23 PROCEDURE — 25000003 PHARM REV CODE 250: Performed by: EMERGENCY MEDICINE

## 2021-01-23 PROCEDURE — 99284 EMERGENCY DEPT VISIT MOD MDM: CPT | Mod: 25

## 2021-01-23 PROCEDURE — 63600175 PHARM REV CODE 636 W HCPCS: Performed by: EMERGENCY MEDICINE

## 2021-01-23 PROCEDURE — 96374 THER/PROPH/DIAG INJ IV PUSH: CPT

## 2021-01-23 RX ORDER — PROCHLORPERAZINE EDISYLATE 5 MG/ML
10 INJECTION INTRAMUSCULAR; INTRAVENOUS
Status: DISCONTINUED | OUTPATIENT
Start: 2021-01-23 | End: 2021-01-24 | Stop reason: HOSPADM

## 2021-01-23 RX ORDER — BUTALBITAL, ACETAMINOPHEN AND CAFFEINE 50; 325; 40 MG/1; MG/1; MG/1
1 TABLET ORAL EVERY 4 HOURS PRN
Qty: 12 TABLET | Refills: 0 | Status: SHIPPED | OUTPATIENT
Start: 2021-01-23 | End: 2021-02-05 | Stop reason: ALTCHOICE

## 2021-01-23 RX ORDER — KETOROLAC TROMETHAMINE 30 MG/ML
15 INJECTION, SOLUTION INTRAMUSCULAR; INTRAVENOUS
Status: COMPLETED | OUTPATIENT
Start: 2021-01-23 | End: 2021-01-23

## 2021-01-23 RX ORDER — DIPHENHYDRAMINE HYDROCHLORIDE 50 MG/ML
25 INJECTION INTRAMUSCULAR; INTRAVENOUS
Status: DISCONTINUED | OUTPATIENT
Start: 2021-01-23 | End: 2021-01-24 | Stop reason: HOSPADM

## 2021-01-23 RX ORDER — METOPROLOL TARTRATE 25 MG/1
25 TABLET, FILM COATED ORAL ONCE
Status: COMPLETED | OUTPATIENT
Start: 2021-01-23 | End: 2021-01-23

## 2021-01-23 RX ADMIN — KETOROLAC TROMETHAMINE 15 MG: 30 INJECTION, SOLUTION INTRAMUSCULAR; INTRAVENOUS at 11:01

## 2021-01-23 RX ADMIN — METOPROLOL TARTRATE 25 MG: 25 TABLET, FILM COATED ORAL at 11:01

## 2021-01-25 DIAGNOSIS — I10 ESSENTIAL HYPERTENSION: Primary | ICD-10-CM

## 2021-01-25 RX ORDER — METOPROLOL SUCCINATE 25 MG/1
25 TABLET, EXTENDED RELEASE ORAL DAILY
Qty: 30 TABLET | Refills: 0 | Status: SHIPPED | OUTPATIENT
Start: 2021-01-25 | End: 2021-02-05 | Stop reason: ALTCHOICE

## 2021-01-27 ENCOUNTER — HOSPITAL ENCOUNTER (EMERGENCY)
Facility: HOSPITAL | Age: 46
Discharge: HOME | End: 2021-01-28
Attending: EMERGENCY MEDICINE
Payer: MEDICAID

## 2021-01-27 DIAGNOSIS — R51.9 NONINTRACTABLE HEADACHE, UNSPECIFIED CHRONICITY PATTERN, UNSPECIFIED HEADACHE TYPE: Primary | ICD-10-CM

## 2021-01-27 PROCEDURE — 99284 EMERGENCY DEPT VISIT MOD MDM: CPT

## 2021-01-28 VITALS
TEMPERATURE: 98 F | WEIGHT: 237 LBS | SYSTOLIC BLOOD PRESSURE: 154 MMHG | HEIGHT: 71 IN | BODY MASS INDEX: 33.18 KG/M2 | HEART RATE: 58 BPM | DIASTOLIC BLOOD PRESSURE: 92 MMHG | OXYGEN SATURATION: 96 % | RESPIRATION RATE: 18 BRPM

## 2021-01-28 LAB
ALBUMIN SERPL BCP-MCNC: 4.1 G/DL (ref 3.5–5.2)
ALP SERPL-CCNC: 42 U/L (ref 55–135)
ALT SERPL W/O P-5'-P-CCNC: 22 U/L (ref 10–44)
ANION GAP SERPL CALC-SCNC: 7 MMOL/L (ref 8–16)
AST SERPL-CCNC: 18 U/L (ref 10–40)
BASOPHILS # BLD AUTO: 0.04 K/UL (ref 0–0.2)
BASOPHILS NFR BLD: 0.9 % (ref 0–1.9)
BILIRUB SERPL-MCNC: 0.9 MG/DL (ref 0.1–1)
BILIRUB UR QL STRIP: NEGATIVE
BUN SERPL-MCNC: 12 MG/DL (ref 6–20)
CALCIUM SERPL-MCNC: 8.8 MG/DL (ref 8.7–10.5)
CHLORIDE SERPL-SCNC: 106 MMOL/L (ref 95–110)
CLARITY UR: CLEAR
CO2 SERPL-SCNC: 25 MMOL/L (ref 23–29)
COLOR UR: YELLOW
CREAT SERPL-MCNC: 1.1 MG/DL (ref 0.5–1.4)
DIFFERENTIAL METHOD: NORMAL
EOSINOPHIL # BLD AUTO: 0.1 K/UL (ref 0–0.5)
EOSINOPHIL NFR BLD: 1.9 % (ref 0–8)
ERYTHROCYTE [DISTWIDTH] IN BLOOD BY AUTOMATED COUNT: 13.1 % (ref 11.5–14.5)
EST. GFR  (AFRICAN AMERICAN): >60 ML/MIN/1.73 M^2
EST. GFR  (NON AFRICAN AMERICAN): >60 ML/MIN/1.73 M^2
GLUCOSE SERPL-MCNC: 91 MG/DL (ref 70–110)
GLUCOSE UR QL STRIP: NEGATIVE
HCT VFR BLD AUTO: 43.6 % (ref 40–54)
HGB BLD-MCNC: 15.1 G/DL (ref 14–18)
HGB UR QL STRIP: NEGATIVE
IMM GRANULOCYTES # BLD AUTO: 0 K/UL (ref 0–0.04)
IMM GRANULOCYTES NFR BLD AUTO: 0 % (ref 0–0.5)
INR PPP: 1.1
KETONES UR QL STRIP: NEGATIVE
LEUKOCYTE ESTERASE UR QL STRIP: NEGATIVE
LYMPHOCYTES # BLD AUTO: 2 K/UL (ref 1–4.8)
LYMPHOCYTES NFR BLD: 41.7 % (ref 18–48)
MCH RBC QN AUTO: 30.6 PG (ref 27–31)
MCHC RBC AUTO-ENTMCNC: 34.6 G/DL (ref 32–36)
MCV RBC AUTO: 88 FL (ref 82–98)
MONOCYTES # BLD AUTO: 0.3 K/UL (ref 0.3–1)
MONOCYTES NFR BLD: 7.2 % (ref 4–15)
NEUTROPHILS # BLD AUTO: 2.3 K/UL (ref 1.8–7.7)
NEUTROPHILS NFR BLD: 48.3 % (ref 38–73)
NITRITE UR QL STRIP: NEGATIVE
NRBC BLD-RTO: 0 /100 WBC
PH UR STRIP: 7 [PH] (ref 5–8)
PLATELET # BLD AUTO: 159 K/UL (ref 150–350)
PMV BLD AUTO: 11.6 FL (ref 9.2–12.9)
POTASSIUM SERPL-SCNC: 3.9 MMOL/L (ref 3.5–5.1)
PROT SERPL-MCNC: 7.3 G/DL (ref 6–8.4)
PROT UR QL STRIP: NEGATIVE
PROTHROMBIN TIME: 13.2 SEC (ref 10.6–14.8)
RBC # BLD AUTO: 4.94 M/UL (ref 4.6–6.2)
SODIUM SERPL-SCNC: 138 MMOL/L (ref 136–145)
SP GR UR STRIP: 1.02 (ref 1–1.03)
URN SPEC COLLECT METH UR: NORMAL
UROBILINOGEN UR STRIP-ACNC: NEGATIVE EU/DL
WBC # BLD AUTO: 4.7 K/UL (ref 3.9–12.7)

## 2021-01-28 PROCEDURE — 63600175 PHARM REV CODE 636 W HCPCS: Performed by: EMERGENCY MEDICINE

## 2021-01-28 PROCEDURE — 81003 URINALYSIS AUTO W/O SCOPE: CPT

## 2021-01-28 PROCEDURE — 96374 THER/PROPH/DIAG INJ IV PUSH: CPT

## 2021-01-28 PROCEDURE — 85025 COMPLETE CBC W/AUTO DIFF WBC: CPT

## 2021-01-28 PROCEDURE — 85610 PROTHROMBIN TIME: CPT

## 2021-01-28 PROCEDURE — 80053 COMPREHEN METABOLIC PANEL: CPT

## 2021-01-28 RX ORDER — KETOROLAC TROMETHAMINE 30 MG/ML
15 INJECTION, SOLUTION INTRAMUSCULAR; INTRAVENOUS
Status: COMPLETED | OUTPATIENT
Start: 2021-01-28 | End: 2021-01-28

## 2021-01-28 RX ORDER — NAPROXEN 500 MG/1
500 TABLET ORAL 2 TIMES DAILY WITH MEALS
Qty: 30 TABLET | Refills: 0 | Status: ON HOLD | OUTPATIENT
Start: 2021-01-28 | End: 2022-01-21 | Stop reason: HOSPADM

## 2021-01-28 RX ADMIN — KETOROLAC TROMETHAMINE 15 MG: 30 INJECTION, SOLUTION INTRAMUSCULAR; INTRAVENOUS at 01:01

## 2021-02-05 ENCOUNTER — OFFICE VISIT (OUTPATIENT)
Dept: FAMILY MEDICINE | Facility: CLINIC | Age: 46
End: 2021-02-05
Payer: MEDICAID

## 2021-02-05 ENCOUNTER — TELEPHONE (OUTPATIENT)
Dept: NEUROLOGY | Facility: CLINIC | Age: 46
End: 2021-02-05

## 2021-02-05 VITALS
OXYGEN SATURATION: 97 % | HEART RATE: 57 BPM | WEIGHT: 236.13 LBS | HEIGHT: 71 IN | TEMPERATURE: 97 F | BODY MASS INDEX: 33.06 KG/M2 | SYSTOLIC BLOOD PRESSURE: 116 MMHG | DIASTOLIC BLOOD PRESSURE: 62 MMHG | RESPIRATION RATE: 16 BRPM

## 2021-02-05 DIAGNOSIS — G44.009 MIGRAINE-CLUSTER HEADACHE SYNDROME: ICD-10-CM

## 2021-02-05 DIAGNOSIS — R00.2 PALPITATIONS: Primary | ICD-10-CM

## 2021-02-05 DIAGNOSIS — I10 HYPERTENSION, UNSPECIFIED TYPE: ICD-10-CM

## 2021-02-05 PROCEDURE — 99214 OFFICE O/P EST MOD 30 MIN: CPT | Mod: PBBFAC,PO | Performed by: FAMILY MEDICINE

## 2021-02-05 PROCEDURE — 99999 PR PBB SHADOW E&M-EST. PATIENT-LVL IV: CPT | Mod: PBBFAC,,, | Performed by: FAMILY MEDICINE

## 2021-02-05 PROCEDURE — 99214 PR OFFICE/OUTPT VISIT, EST, LEVL IV, 30-39 MIN: ICD-10-PCS | Mod: S$PBB,,, | Performed by: FAMILY MEDICINE

## 2021-02-05 PROCEDURE — 99214 OFFICE O/P EST MOD 30 MIN: CPT | Mod: S$PBB,,, | Performed by: FAMILY MEDICINE

## 2021-02-05 PROCEDURE — 99999 PR PBB SHADOW E&M-EST. PATIENT-LVL IV: ICD-10-PCS | Mod: PBBFAC,,, | Performed by: FAMILY MEDICINE

## 2021-02-05 RX ORDER — HYDROCODONE BITARTRATE AND ACETAMINOPHEN 5; 325 MG/1; MG/1
1 TABLET ORAL EVERY 12 HOURS PRN
Qty: 15 TABLET | Refills: 0 | Status: SHIPPED | OUTPATIENT
Start: 2021-02-05 | End: 2021-02-20

## 2021-02-05 RX ORDER — NARATRIPTAN 2.5 MG/1
TABLET ORAL
Qty: 9 TABLET | Refills: 3 | Status: ON HOLD | OUTPATIENT
Start: 2021-02-05 | End: 2022-01-21 | Stop reason: HOSPADM

## 2021-02-05 RX ORDER — HYDROCHLOROTHIAZIDE 25 MG/1
25 TABLET ORAL DAILY
COMMUNITY
Start: 2021-01-28 | End: 2022-08-16 | Stop reason: CLARIF

## 2021-02-05 RX ORDER — DEXAMETHASONE 4 MG/1
4 TABLET ORAL EVERY 12 HOURS
Qty: 10 TABLET | Refills: 3 | Status: SHIPPED | OUTPATIENT
Start: 2021-02-05 | End: 2021-02-10

## 2021-04-20 ENCOUNTER — PATIENT OUTREACH (OUTPATIENT)
Dept: ADMINISTRATIVE | Facility: OTHER | Age: 46
End: 2021-04-20

## 2021-05-21 NOTE — TELEPHONE ENCOUNTER
Patient requesting referral to ENT regarding hearing loss to left ear. Per Dr. Alba office visit recommended to check ear canal for ear wax. Appointment scheduled for 5-9-17--patient is in [place] and will not return until 5-8-17. Patient agreed to appointment date and time.   show

## 2021-08-26 ENCOUNTER — PATIENT OUTREACH (OUTPATIENT)
Dept: ADMINISTRATIVE | Facility: HOSPITAL | Age: 46
End: 2021-08-26

## 2021-09-15 ENCOUNTER — TELEPHONE (OUTPATIENT)
Dept: FAMILY MEDICINE | Facility: CLINIC | Age: 46
End: 2021-09-15

## 2021-09-17 ENCOUNTER — OFFICE VISIT (OUTPATIENT)
Dept: FAMILY MEDICINE | Facility: CLINIC | Age: 46
End: 2021-09-17
Payer: MEDICAID

## 2021-09-17 VITALS
OXYGEN SATURATION: 95 % | DIASTOLIC BLOOD PRESSURE: 100 MMHG | HEART RATE: 67 BPM | HEIGHT: 71 IN | BODY MASS INDEX: 33.89 KG/M2 | WEIGHT: 242.06 LBS | SYSTOLIC BLOOD PRESSURE: 150 MMHG | TEMPERATURE: 98 F

## 2021-09-17 DIAGNOSIS — R29.898 RIGHT HAND WEAKNESS: ICD-10-CM

## 2021-09-17 DIAGNOSIS — M54.12 RADICULOPATHY, CERVICAL REGION: ICD-10-CM

## 2021-09-17 DIAGNOSIS — M25.521 RIGHT ELBOW PAIN: Primary | ICD-10-CM

## 2021-09-17 DIAGNOSIS — R20.0 NUMBNESS AND TINGLING IN RIGHT HAND: ICD-10-CM

## 2021-09-17 DIAGNOSIS — R20.2 NUMBNESS AND TINGLING IN RIGHT HAND: ICD-10-CM

## 2021-09-17 DIAGNOSIS — E66.9 OBESITY (BMI 30-39.9): ICD-10-CM

## 2021-09-17 PROCEDURE — 99213 OFFICE O/P EST LOW 20 MIN: CPT | Mod: S$PBB,,, | Performed by: NURSE PRACTITIONER

## 2021-09-17 PROCEDURE — 99999 PR PBB SHADOW E&M-EST. PATIENT-LVL IV: CPT | Mod: PBBFAC,,, | Performed by: NURSE PRACTITIONER

## 2021-09-17 PROCEDURE — 99214 OFFICE O/P EST MOD 30 MIN: CPT | Mod: PBBFAC,PO | Performed by: NURSE PRACTITIONER

## 2021-09-17 PROCEDURE — 96372 THER/PROPH/DIAG INJ SC/IM: CPT | Mod: PBBFAC,PO

## 2021-09-17 PROCEDURE — 99213 PR OFFICE/OUTPT VISIT, EST, LEVL III, 20-29 MIN: ICD-10-PCS | Mod: S$PBB,,, | Performed by: NURSE PRACTITIONER

## 2021-09-17 PROCEDURE — 99999 PR PBB SHADOW E&M-EST. PATIENT-LVL IV: ICD-10-PCS | Mod: PBBFAC,,, | Performed by: NURSE PRACTITIONER

## 2021-09-17 RX ORDER — HYDROCODONE BITARTRATE AND ACETAMINOPHEN 5; 325 MG/1; MG/1
1 TABLET ORAL EVERY 12 HOURS PRN
Qty: 14 TABLET | Refills: 0 | Status: SHIPPED | OUTPATIENT
Start: 2021-09-17 | End: 2021-09-24

## 2021-09-17 RX ORDER — DEXAMETHASONE SODIUM PHOSPHATE 4 MG/ML
8 INJECTION, SOLUTION INTRA-ARTICULAR; INTRALESIONAL; INTRAMUSCULAR; INTRAVENOUS; SOFT TISSUE
Status: COMPLETED | OUTPATIENT
Start: 2021-09-17 | End: 2021-09-17

## 2021-09-17 RX ORDER — METOPROLOL SUCCINATE 25 MG/1
25 TABLET, EXTENDED RELEASE ORAL DAILY
COMMUNITY
Start: 2021-07-18 | End: 2022-07-20 | Stop reason: SDUPTHER

## 2021-09-17 RX ADMIN — DEXAMETHASONE SODIUM PHOSPHATE 8 MG: 4 INJECTION, SOLUTION INTRA-ARTICULAR; INTRALESIONAL; INTRAMUSCULAR; INTRAVENOUS; SOFT TISSUE at 02:09

## 2021-09-18 ENCOUNTER — HOSPITAL ENCOUNTER (OUTPATIENT)
Dept: RADIOLOGY | Facility: HOSPITAL | Age: 46
Discharge: HOME | End: 2021-09-18
Attending: NURSE PRACTITIONER
Payer: MEDICAID

## 2021-09-18 DIAGNOSIS — M54.12 RADICULOPATHY, CERVICAL REGION: ICD-10-CM

## 2021-09-18 DIAGNOSIS — M25.521 RIGHT ELBOW PAIN: ICD-10-CM

## 2021-09-20 ENCOUNTER — TELEPHONE (OUTPATIENT)
Dept: FAMILY MEDICINE | Facility: CLINIC | Age: 46
End: 2021-09-20

## 2022-01-01 ENCOUNTER — HOSPITAL ENCOUNTER (EMERGENCY)
Facility: HOSPITAL | Age: 47
Discharge: HOME | End: 2022-01-02
Attending: EMERGENCY MEDICINE
Payer: MEDICAID

## 2022-01-01 DIAGNOSIS — I10 HYPERTENSION, UNSPECIFIED TYPE: Primary | ICD-10-CM

## 2022-01-01 PROCEDURE — 99281 EMR DPT VST MAYX REQ PHY/QHP: CPT

## 2022-01-02 VITALS
TEMPERATURE: 98 F | HEIGHT: 72 IN | DIASTOLIC BLOOD PRESSURE: 90 MMHG | HEART RATE: 66 BPM | BODY MASS INDEX: 31.83 KG/M2 | RESPIRATION RATE: 16 BRPM | WEIGHT: 235 LBS | OXYGEN SATURATION: 99 % | SYSTOLIC BLOOD PRESSURE: 151 MMHG

## 2022-01-02 NOTE — ED NOTES
"Pt reports non-compliant with meds for "a while" reports taking rx meds tonight at 2100 / c/o "dizzy at times"   "

## 2022-01-02 NOTE — DISCHARGE INSTRUCTIONS
RETURN TO EMERGENCY DEPARTMENT WITHOUT FAIL, IF YOUR SYMPTOMS WORSEN, IF YOU GET NEW OR DIFFERENT SYMPTOMS, IF YOU ARE UNABLE TO FOLLOW UP AS DIRECTED, OR IF YOU HAVE ANY CONCERNS OR WORRIES.    Take your blood pressure medication your hydrochlorothiazide any or losartan.  Monitor blood pressure over the next week on this.  Follow-up closely with your primary care provider for adjustment of her medication.

## 2022-01-02 NOTE — ED PROVIDER NOTES
Encounter Date: 1/1/2022       History     Chief Complaint   Patient presents with    Dizziness     This is a 46-year-old male who presents emergency department with high blood pressure, lightheadedness.  He says that he knew his blood pressure was high earlier today when he was washing his truck he felt lightheaded.  He says the symptoms have improved but want to come get checked out.  He out in the waiting room took all of his blood pressure medication.  He says he has not been taking his blood pressure medication for the last week.  He took his hydrochlorothiazide, metoprolol and his losartan out in the waiting room says he is feeling better.  He has no complaints currently denies any headache denies any lightheadedness or dizziness.  He denies any chest pain or any shortness of breath.  Denies any unilateral numbness weakness or tingling.  Denies any dysarthria diplopia dysphagia.        Review of patient's allergies indicates:   Allergen Reactions    Ace inhibitors Other (See Comments)     cough     Past Medical History:   Diagnosis Date    Allergy     Anxiety     Hypertension     Migraine headache     Migraine syndrome      Past Surgical History:   Procedure Laterality Date    BACK SURGERY      FRACTURE SURGERY      LEFT HEART CATHETERIZATION Left 9/19/2019    Procedure: CATHETERIZATION, HEART, LEFT  (RIGHT RADIAL ACCESS);  Surgeon: Ronnie Bailey MD;  Location: [facility] CATH/EP LAB;  Service: Cardiology;  Laterality: Left;    LUMBAR DISC SURGERY Bilateral     L4- L5 , 2016 April,  Trans    VOCAL FOLD LESION EXCISION  2008     Family History   Problem Relation Age of Onset    Allergic rhinitis Mother     Hypertension Mother     Heart disease Mother     Stroke Mother     Heart disease Father 49    Cancer Maternal Aunt         breast    Heart disease Maternal Aunt     Angioedema Neg Hx     Asthma Neg Hx     Atopy Neg Hx     Eczema Neg Hx     Immunodeficiency Neg Hx     Urticaria Neg Hx       Social History     Tobacco Use    Smoking status: Never Smoker    Smokeless tobacco: Never Used   Substance Use Topics    Alcohol use: No    Drug use: No     Review of Systems   Constitutional: Negative for fever.   HENT: Negative for sore throat.    Respiratory: Negative for shortness of breath.    Cardiovascular: Negative for chest pain.   Gastrointestinal: Negative for nausea.   Genitourinary: Negative for dysuria.   Musculoskeletal: Negative for back pain.   Skin: Negative for rash.   Neurological: Positive for light-headedness. Negative for weakness.   Hematological: Does not bruise/bleed easily.   All other systems reviewed and are negative.      Physical Exam     Initial Vitals [01/01/22 2110]   BP Pulse Resp Temp SpO2   (!) 189/119 68 16 98.3 °F (36.8 °C) 98 %      MAP       --         Physical Exam    Nursing note and vitals reviewed.  Constitutional: He appears well-developed and well-nourished. He is not diaphoretic. No distress.   HENT:   Head: Normocephalic and atraumatic.   Mouth/Throat: Oropharynx is clear and moist. No oropharyngeal exudate.   Eyes: Conjunctivae and EOM are normal. Pupils are equal, round, and reactive to light.   Neck: Neck supple. No tracheal deviation present.   Cardiovascular: Normal rate, regular rhythm, normal heart sounds and intact distal pulses.   No murmur heard.  Pulmonary/Chest: Breath sounds normal. No stridor. No respiratory distress. He has no wheezes. He has no rhonchi. He has no rales.   Abdominal: Abdomen is soft. Bowel sounds are normal. He exhibits no distension. There is no abdominal tenderness. There is no rebound and no guarding.   Musculoskeletal:         General: No tenderness or edema. Normal range of motion.      Cervical back: Neck supple.     Neurological: He is alert and oriented to person, place, and time. He has normal strength. No cranial nerve deficit or sensory deficit. GCS score is 15. GCS eye subscore is 4. GCS verbal subscore is 5. GCS  motor subscore is 6.   5/5 strength in upper and lower extremities bilaterally.  Normal finger-to-nose.  Normal heel-to-shin.  Speech is normal.  No facial droop noted.   Skin: Skin is warm and dry. Capillary refill takes less than 2 seconds. No rash noted. No erythema. No pallor.   Psychiatric: He has a normal mood and affect.          ED Course   Procedures  Labs Reviewed - No data to display       Imaging Results    None          Medications - No data to display  Medical Decision Making:   ED Management:  This is a 46-year-old male who presents emergency department concern for high blood pressure lightheadedness.  He had been out in the waiting room for 3 hours but he took his blood pressure medication out in the waiting room.  Throughout the time he was waiting and throughout the emergency department evaluation is blood pressure is markedly improved and when I saw him he was asymptomatic.  He is feeling better and has no complaints.  He does not have any neurological deficits or any neurological complaints.  Less suspicious for any stroke, heart attack, or any other acute hypertensive emergency. I discussed with pt. need for better control of BP and counseled him on prolonged uncontrolled hypertension leading to MI, Stroke, Dissection, Kidney Failure, etc. and the patient understood. The patient has good follow up with the primary care provider and is agreeable to follow up. There is no hypertensive emergency in the ED. The patient is asymptomatic.  We discussed taking his losartan and his hydrochlorothiazide but holding off on his metoprolol as his heart rate is borderline.  He may need adjustment of the above-mentioned medications with his primary care provider.  He is in agreement with the above-mentioned plan    I had a detailed discussion with the patient and/or guardian regarding: The historical points, exam findings, and diagnostic results supporting the discharge diagnosis, lab results, pertinent  radiology results, and the need for outpatient follow-up, for definitive care with a family practitioner and to return to the emergency department if symptoms worsen or persist or if there are any questions or concerns that arise at home. All questions have been answered in detail. Strict return to Emergency Department precautions have been provided.      A dictation software program was used for this note.  Please expect some simple typographical  errors in this note.    This patient was seen during the context of the Covid 19 global pandemic where local, state, hospital guidelines, were followed to the best of ability given the circumstances of the pandemic.                          Clinical Impression:   Final diagnoses:  [I10] Hypertension, unspecified type (Primary)          ED Disposition Condition    Discharge Stable        ED Prescriptions     None        Follow-up Information     Follow up With Specialties Details Why Contact Info Additional Information    Steven Alba MD Family Medicine In 3 days  [address] [facility] [address]  [phone]       [facility] - Emergency Dept Emergency Medicine  If symptoms worsen [address] [facility] [id number] [phone] 1st floor           Abe Jones DO  01/02/22 0402

## 2022-01-07 DIAGNOSIS — K21.00 GASTROESOPHAGEAL REFLUX DISEASE WITH ESOPHAGITIS WITHOUT HEMORRHAGE: ICD-10-CM

## 2022-01-07 RX ORDER — OMEPRAZOLE 40 MG/1
40 CAPSULE, DELAYED RELEASE ORAL
Qty: 180 CAPSULE | Refills: 3 | Status: ON HOLD | OUTPATIENT
Start: 2022-01-07 | End: 2022-01-21 | Stop reason: HOSPADM

## 2022-01-20 ENCOUNTER — HOSPITAL ENCOUNTER (OUTPATIENT)
Facility: HOSPITAL | Age: 47
Discharge: HOME | End: 2022-01-21
Attending: EMERGENCY MEDICINE | Admitting: INTERNAL MEDICINE
Payer: MEDICAID

## 2022-01-20 DIAGNOSIS — R07.9 CHEST PAIN AT REST: ICD-10-CM

## 2022-01-20 DIAGNOSIS — R94.39 ABNORMAL STRESS TEST: ICD-10-CM

## 2022-01-20 DIAGNOSIS — R94.31 ABNORMAL ECG: ICD-10-CM

## 2022-01-20 DIAGNOSIS — R07.9 CHEST PAIN: Primary | ICD-10-CM

## 2022-01-20 DIAGNOSIS — R94.31 NONSPECIFIC ABNORMAL ELECTROCARDIOGRAM (ECG) (EKG): ICD-10-CM

## 2022-01-20 DIAGNOSIS — R07.9 CHEST PAIN, UNSPECIFIED TYPE: ICD-10-CM

## 2022-01-20 LAB
ALBUMIN SERPL BCP-MCNC: 3.9 G/DL (ref 3.5–5.2)
ALP SERPL-CCNC: 40 U/L (ref 55–135)
ALT SERPL W/O P-5'-P-CCNC: 26 U/L (ref 10–44)
AMPHET+METHAMPHET UR QL: NEGATIVE
ANION GAP SERPL CALC-SCNC: 6 MMOL/L (ref 8–16)
AST SERPL-CCNC: 17 U/L (ref 10–40)
BARBITURATES UR QL SCN>200 NG/ML: NEGATIVE
BASOPHILS # BLD AUTO: 0.03 K/UL (ref 0–0.2)
BASOPHILS NFR BLD: 0.7 % (ref 0–1.9)
BENZODIAZ UR QL SCN>200 NG/ML: NEGATIVE
BILIRUB SERPL-MCNC: 0.7 MG/DL (ref 0.1–1)
BUN SERPL-MCNC: 12 MG/DL (ref 6–20)
BZE UR QL SCN: NEGATIVE
CALCIUM SERPL-MCNC: 8.3 MG/DL (ref 8.7–10.5)
CANNABINOIDS UR QL SCN: NEGATIVE
CHLORIDE SERPL-SCNC: 106 MMOL/L (ref 95–110)
CO2 SERPL-SCNC: 27 MMOL/L (ref 23–29)
CREAT SERPL-MCNC: 1.1 MG/DL (ref 0.5–1.4)
CREAT UR-MCNC: 68 MG/DL (ref 23–375)
DIFFERENTIAL METHOD: NORMAL
EOSINOPHIL # BLD AUTO: 0.1 K/UL (ref 0–0.5)
EOSINOPHIL NFR BLD: 1.3 % (ref 0–8)
ERYTHROCYTE [DISTWIDTH] IN BLOOD BY AUTOMATED COUNT: 13.5 % (ref 11.5–14.5)
EST. GFR  (AFRICAN AMERICAN): >60 ML/MIN/1.73 M^2
EST. GFR  (NON AFRICAN AMERICAN): >60 ML/MIN/1.73 M^2
GLUCOSE SERPL-MCNC: 88 MG/DL (ref 70–110)
HCT VFR BLD AUTO: 49.5 % (ref 40–54)
HGB BLD-MCNC: 16.7 G/DL (ref 14–18)
IMM GRANULOCYTES # BLD AUTO: 0.01 K/UL (ref 0–0.04)
IMM GRANULOCYTES NFR BLD AUTO: 0.2 % (ref 0–0.5)
LYMPHOCYTES # BLD AUTO: 1.3 K/UL (ref 1–4.8)
LYMPHOCYTES NFR BLD: 28.7 % (ref 18–48)
MAGNESIUM SERPL-MCNC: 2.5 MG/DL (ref 1.6–2.6)
MCH RBC QN AUTO: 29.5 PG (ref 27–31)
MCHC RBC AUTO-ENTMCNC: 33.7 G/DL (ref 32–36)
MCV RBC AUTO: 88 FL (ref 82–98)
MONOCYTES # BLD AUTO: 0.4 K/UL (ref 0.3–1)
MONOCYTES NFR BLD: 9.2 % (ref 4–15)
NEUTROPHILS # BLD AUTO: 2.7 K/UL (ref 1.8–7.7)
NEUTROPHILS NFR BLD: 59.9 % (ref 38–73)
NRBC BLD-RTO: 0 /100 WBC
OPIATES UR QL SCN: NEGATIVE
PCP UR QL SCN>25 NG/ML: NEGATIVE
PLATELET # BLD AUTO: 174 K/UL (ref 150–450)
PMV BLD AUTO: 12.7 FL (ref 9.2–12.9)
POTASSIUM SERPL-SCNC: 3.4 MMOL/L (ref 3.5–5.1)
PROT SERPL-MCNC: 7 G/DL (ref 6–8.4)
RBC # BLD AUTO: 5.66 M/UL (ref 4.6–6.2)
SARS-COV-2 RDRP RESP QL NAA+PROBE: NEGATIVE
SODIUM SERPL-SCNC: 139 MMOL/L (ref 136–145)
TOXICOLOGY INFORMATION: NORMAL
TROPONIN I SERPL DL<=0.01 NG/ML-MCNC: <0.03 NG/ML
TROPONIN I SERPL DL<=0.01 NG/ML-MCNC: <0.03 NG/ML
WBC # BLD AUTO: 4.46 K/UL (ref 3.9–12.7)

## 2022-01-20 PROCEDURE — 83735 ASSAY OF MAGNESIUM: CPT | Performed by: EMERGENCY MEDICINE

## 2022-01-20 PROCEDURE — 25000003 PHARM REV CODE 250: Performed by: EMERGENCY MEDICINE

## 2022-01-20 PROCEDURE — G0378 HOSPITAL OBSERVATION PER HR: HCPCS

## 2022-01-20 PROCEDURE — 85025 COMPLETE CBC W/AUTO DIFF WBC: CPT | Performed by: PHYSICIAN ASSISTANT

## 2022-01-20 PROCEDURE — 84484 ASSAY OF TROPONIN QUANT: CPT | Mod: 91 | Performed by: PHYSICIAN ASSISTANT

## 2022-01-20 PROCEDURE — 80307 DRUG TEST PRSMV CHEM ANLYZR: CPT | Performed by: NURSE PRACTITIONER

## 2022-01-20 PROCEDURE — 93010 ELECTROCARDIOGRAM REPORT: CPT | Mod: ,,, | Performed by: SPECIALIST

## 2022-01-20 PROCEDURE — 94761 N-INVAS EAR/PLS OXIMETRY MLT: CPT

## 2022-01-20 PROCEDURE — 84484 ASSAY OF TROPONIN QUANT: CPT | Performed by: NURSE PRACTITIONER

## 2022-01-20 PROCEDURE — 99285 EMERGENCY DEPT VISIT HI MDM: CPT | Mod: 25

## 2022-01-20 PROCEDURE — 93005 ELECTROCARDIOGRAM TRACING: CPT | Performed by: SPECIALIST

## 2022-01-20 PROCEDURE — 80053 COMPREHEN METABOLIC PANEL: CPT | Performed by: PHYSICIAN ASSISTANT

## 2022-01-20 PROCEDURE — 93010 EKG 12-LEAD: ICD-10-PCS | Mod: ,,, | Performed by: SPECIALIST

## 2022-01-20 PROCEDURE — U0002 COVID-19 LAB TEST NON-CDC: HCPCS | Performed by: EMERGENCY MEDICINE

## 2022-01-20 PROCEDURE — 25000003 PHARM REV CODE 250: Performed by: NURSE PRACTITIONER

## 2022-01-20 RX ORDER — TALC
6 POWDER (GRAM) TOPICAL NIGHTLY PRN
Status: DISCONTINUED | OUTPATIENT
Start: 2022-01-20 | End: 2022-01-21 | Stop reason: HOSPADM

## 2022-01-20 RX ORDER — SODIUM CHLORIDE 0.9 % (FLUSH) 0.9 %
10 SYRINGE (ML) INJECTION
Status: DISCONTINUED | OUTPATIENT
Start: 2022-01-20 | End: 2022-01-21 | Stop reason: HOSPADM

## 2022-01-20 RX ORDER — POTASSIUM CHLORIDE 20 MEQ/1
40 TABLET, EXTENDED RELEASE ORAL ONCE
Status: COMPLETED | OUTPATIENT
Start: 2022-01-20 | End: 2022-01-20

## 2022-01-20 RX ORDER — ASPIRIN 81 MG/1
81 TABLET ORAL DAILY
Status: DISCONTINUED | OUTPATIENT
Start: 2022-01-21 | End: 2022-01-21 | Stop reason: HOSPADM

## 2022-01-20 RX ORDER — ATORVASTATIN CALCIUM 40 MG/1
40 TABLET, FILM COATED ORAL NIGHTLY
Status: DISCONTINUED | OUTPATIENT
Start: 2022-01-20 | End: 2022-01-21 | Stop reason: HOSPADM

## 2022-01-20 RX ORDER — ASPIRIN 325 MG
325 TABLET ORAL
Status: COMPLETED | OUTPATIENT
Start: 2022-01-20 | End: 2022-01-20

## 2022-01-20 RX ORDER — MORPHINE SULFATE 2 MG/ML
1 INJECTION, SOLUTION INTRAMUSCULAR; INTRAVENOUS EVERY 6 HOURS PRN
Status: DISCONTINUED | OUTPATIENT
Start: 2022-01-20 | End: 2022-01-21 | Stop reason: HOSPADM

## 2022-01-20 RX ORDER — ACETAMINOPHEN 325 MG/1
650 TABLET ORAL EVERY 8 HOURS PRN
Status: DISCONTINUED | OUTPATIENT
Start: 2022-01-20 | End: 2022-01-21 | Stop reason: HOSPADM

## 2022-01-20 RX ORDER — METOPROLOL SUCCINATE 25 MG/1
25 TABLET, EXTENDED RELEASE ORAL DAILY
Status: DISCONTINUED | OUTPATIENT
Start: 2022-01-21 | End: 2022-01-21 | Stop reason: HOSPADM

## 2022-01-20 RX ORDER — NITROGLYCERIN 0.4 MG/1
0.4 TABLET SUBLINGUAL EVERY 5 MIN PRN
Status: DISCONTINUED | OUTPATIENT
Start: 2022-01-20 | End: 2022-01-21 | Stop reason: HOSPADM

## 2022-01-20 RX ORDER — ONDANSETRON 2 MG/ML
4 INJECTION INTRAMUSCULAR; INTRAVENOUS EVERY 8 HOURS PRN
Status: DISCONTINUED | OUTPATIENT
Start: 2022-01-20 | End: 2022-01-21 | Stop reason: HOSPADM

## 2022-01-20 RX ORDER — HYDROCHLOROTHIAZIDE 25 MG/1
25 TABLET ORAL DAILY
Status: DISCONTINUED | OUTPATIENT
Start: 2022-01-21 | End: 2022-01-21 | Stop reason: HOSPADM

## 2022-01-20 RX ORDER — FAMOTIDINE 20 MG/1
20 TABLET, FILM COATED ORAL 2 TIMES DAILY
Status: DISCONTINUED | OUTPATIENT
Start: 2022-01-20 | End: 2022-01-21 | Stop reason: HOSPADM

## 2022-01-20 RX ORDER — LOSARTAN POTASSIUM 50 MG/1
100 TABLET ORAL DAILY
Status: DISCONTINUED | OUTPATIENT
Start: 2022-01-21 | End: 2022-01-21

## 2022-01-20 RX ADMIN — ATORVASTATIN CALCIUM 40 MG: 20 TABLET, FILM COATED ORAL at 10:01

## 2022-01-20 RX ADMIN — POTASSIUM CHLORIDE 40 MEQ: 20 TABLET, EXTENDED RELEASE ORAL at 10:01

## 2022-01-20 RX ADMIN — FAMOTIDINE 20 MG: 20 TABLET, FILM COATED ORAL at 10:01

## 2022-01-20 RX ADMIN — ASPIRIN 325 MG ORAL TABLET 325 MG: 325 PILL ORAL at 09:01

## 2022-01-20 NOTE — FIRST PROVIDER EVALUATION
Emergency Department TeleTriage Encounter Note      CHIEF COMPLAINT    Chief Complaint   Patient presents with    Chest Pain     L sided substernal Chest pain x a few days        VITAL SIGNS   Initial Vitals [01/20/22 1520]   BP Pulse Resp Temp SpO2   (!) 169/83 82 18 98.6 °F (37 °C) 100 %      MAP       --            ALLERGIES    Review of patient's allergies indicates:   Allergen Reactions    Ace inhibitors Other (See Comments)     cough       PROVIDER TRIAGE NOTE  Patient is a 46-year-old male who presents with intermittent chest pain over the last few days.  He denies shortness of breath.  No alleviating or exacerbating symptoms.  No lower extremity swelling.    Initial orders will be placed and care will be transferred to an alternate provider when patient is roomed for a full evaluation. Any additional orders and the final disposition will be determined by that provider.        ORDERS  Labs Reviewed   CBC W/ AUTO DIFFERENTIAL   COMPREHENSIVE METABOLIC PANEL   TROPONIN I       ED Orders (720h ago, onward)    Start Ordered     Status Ordering Provider    01/20/22 1630 01/20/22 1630  Vital signs  Every 15 min         Ordered NEGROTTO CECILLE, LEXII    01/20/22 1630 01/20/22 1630  Cardiac Monitoring - Adult  Continuous        Comments: Notify Physician If:    Ordered NEGROTTO CECILLE, LEXII    01/20/22 1630 01/20/22 1630  Pulse Oximetry Continuous  Continuous         Ordered NEGROTTO CECILLE, LEXII    01/20/22 1630 01/20/22 1630  Diet NPO  Diet effective now         Ordered NEGROTTO CECILLE, LEXII    01/20/22 1630 01/20/22 1630  Saline lock IV  Once         Ordered NEGROTTO CECILLE, LEXII    01/20/22 1630 01/20/22 1630  CBC auto differential  STAT         Ordered NEGROTTO CECILLE, LEXII    01/20/22 1630 01/20/22 1630  Comprehensive metabolic panel  STAT         Ordered NEGROTTO CECILLE, LEXII    01/20/22 1630 01/20/22 1630  Troponin I #1  STAT         Ordered NEGROTTO CECILLE,  LEXII    01/20/22 1630 01/20/22 1630  X-Ray Chest AP Portable  1 time imaging         Ordered LEXII ALEMAN    01/20/22 1521 01/20/22 1521  EKG 12-lead  Once         In process ANALIA JURADO            Virtual Visit Note: The provider triage portion of this emergency department evaluation and documentation was performed via [employer], a HIPAA-compliant telemedicine application, in concert with a tele-presenter in the room. A face to face patient evaluation with one of my colleagues will occur once the patient is placed in an emergency department room.      DISCLAIMER: This note was prepared with [employer] voice recognition transcription software. Garbled syntax, mangled pronouns, and other bizarre constructions may be attributed to that software system.

## 2022-01-21 ENCOUNTER — HOSPITAL ENCOUNTER (OUTPATIENT)
Dept: RADIOLOGY | Facility: HOSPITAL | Age: 47
Discharge: HOME | End: 2022-01-21
Payer: MEDICAID

## 2022-01-21 ENCOUNTER — CLINICAL SUPPORT (OUTPATIENT)
Dept: CARDIOLOGY | Facility: HOSPITAL | Age: 47
End: 2022-01-21
Payer: MEDICAID

## 2022-01-21 VITALS
BODY MASS INDEX: 31.83 KG/M2 | WEIGHT: 235 LBS | OXYGEN SATURATION: 97 % | TEMPERATURE: 98 F | HEART RATE: 72 BPM | DIASTOLIC BLOOD PRESSURE: 73 MMHG | RESPIRATION RATE: 20 BRPM | SYSTOLIC BLOOD PRESSURE: 118 MMHG | HEIGHT: 72 IN

## 2022-01-21 VITALS — BODY MASS INDEX: 31.83 KG/M2 | HEIGHT: 72 IN | WEIGHT: 235 LBS

## 2022-01-21 LAB
ANION GAP SERPL CALC-SCNC: 7 MMOL/L (ref 8–16)
AORTIC ROOT ANNULUS: 3.2 CM
AORTIC VALVE CUSP SEPERATION: 2.45 CM
AV INDEX (PROSTH): 0.79
AV MEAN GRADIENT: 3 MMHG
AV PEAK GRADIENT: 5 MMHG
AV VALVE AREA: 4.04 CM2
AV VELOCITY RATIO: 76.58
BASOPHILS # BLD AUTO: 0.03 K/UL (ref 0–0.2)
BASOPHILS NFR BLD: 0.6 % (ref 0–1.9)
BSA FOR ECHO PROCEDURE: 2.32 M2
BUN SERPL-MCNC: 14 MG/DL (ref 6–20)
CALCIUM SERPL-MCNC: 9.1 MG/DL (ref 8.7–10.5)
CHLORIDE SERPL-SCNC: 103 MMOL/L (ref 95–110)
CO2 SERPL-SCNC: 28 MMOL/L (ref 23–29)
CREAT SERPL-MCNC: 1.3 MG/DL (ref 0.5–1.4)
CV ECHO LV RWT: 0.56 CM
CV STRESS BASE HR: 69 BPM
DIASTOLIC BLOOD PRESSURE: 93 MMHG
DIFFERENTIAL METHOD: NORMAL
DOP CALC AO PEAK VEL: 1.13 M/S
DOP CALC AO VTI: 19.45 CM
DOP CALC LVOT AREA: 5.1 CM2
DOP CALC LVOT DIAMETER: 2.56 CM
DOP CALC LVOT PEAK VEL: 86.53 M/S
DOP CALC LVOT STROKE VOLUME: 78.66 CM3
DOP CALCLVOT PEAK VEL VTI: 15.29 CM
E WAVE DECELERATION TIME: 240.35 MSEC
E/A RATIO: 1.21
E/E' RATIO: 4.84 M/S
ECHO LV POSTERIOR WALL: 1.37 CM (ref 0.6–1.1)
EJECTION FRACTION: 65 %
EOSINOPHIL # BLD AUTO: 0.1 K/UL (ref 0–0.5)
EOSINOPHIL NFR BLD: 1.6 % (ref 0–8)
ERYTHROCYTE [DISTWIDTH] IN BLOOD BY AUTOMATED COUNT: 13.4 % (ref 11.5–14.5)
EST. GFR  (AFRICAN AMERICAN): >60 ML/MIN/1.73 M^2
EST. GFR  (NON AFRICAN AMERICAN): >60 ML/MIN/1.73 M^2
FRACTIONAL SHORTENING: 30 % (ref 28–44)
GLUCOSE SERPL-MCNC: 100 MG/DL (ref 70–110)
HCT VFR BLD AUTO: 44.2 % (ref 40–54)
HGB BLD-MCNC: 15.1 G/DL (ref 14–18)
IMM GRANULOCYTES # BLD AUTO: 0.01 K/UL (ref 0–0.04)
IMM GRANULOCYTES NFR BLD AUTO: 0.2 % (ref 0–0.5)
INTERVENTRICULAR SEPTUM: 1.37 CM (ref 0.6–1.1)
IVRT: 87.02 MSEC
LEFT ATRIUM SIZE: 3.91 CM
LEFT INTERNAL DIMENSION IN SYSTOLE: 3.43 CM (ref 2.1–4)
LEFT VENTRICLE MASS INDEX: 120 G/M2
LEFT VENTRICULAR INTERNAL DIMENSION IN DIASTOLE: 4.88 CM (ref 3.5–6)
LEFT VENTRICULAR MASS: 272.03 G
LV LATERAL E/E' RATIO: 3.83 M/S
LV SEPTAL E/E' RATIO: 6.57 M/S
LYMPHOCYTES # BLD AUTO: 2 K/UL (ref 1–4.8)
LYMPHOCYTES NFR BLD: 39.6 % (ref 18–48)
MCH RBC QN AUTO: 29.7 PG (ref 27–31)
MCHC RBC AUTO-ENTMCNC: 34.2 G/DL (ref 32–36)
MCV RBC AUTO: 87 FL (ref 82–98)
MONOCYTES # BLD AUTO: 0.5 K/UL (ref 0.3–1)
MONOCYTES NFR BLD: 8.9 % (ref 4–15)
MV PEAK A VEL: 0.38 M/S
MV PEAK E VEL: 0.46 M/S
NEUTROPHILS # BLD AUTO: 2.5 K/UL (ref 1.8–7.7)
NEUTROPHILS NFR BLD: 49.1 % (ref 38–73)
NRBC BLD-RTO: 0 /100 WBC
OHS CV CPX 1 MINUTE RECOVERY HEART RATE: 150 BPM
OHS CV CPX 85 PERCENT MAX PREDICTED HEART RATE MALE: 150
OHS CV CPX ESTIMATED METS: 10
OHS CV CPX MAX PREDICTED HEART RATE: 176
OHS CV CPX PATIENT IS FEMALE: 0
OHS CV CPX PATIENT IS MALE: 1
OHS CV CPX PEAK DIASTOLIC BLOOD PRESSURE: 80 MMHG
OHS CV CPX PEAK HEAR RATE: 158 BPM
OHS CV CPX PEAK RATE PRESSURE PRODUCT: NORMAL
OHS CV CPX PEAK SYSTOLIC BLOOD PRESSURE: 188 MMHG
OHS CV CPX PERCENT MAX PREDICTED HEART RATE ACHIEVED: 90
OHS CV CPX RATE PRESSURE PRODUCT PRESENTING: 8556
PLATELET # BLD AUTO: 156 K/UL (ref 150–450)
PMV BLD AUTO: 11 FL (ref 9.2–12.9)
POTASSIUM SERPL-SCNC: 3.6 MMOL/L (ref 3.5–5.1)
PV PEAK VELOCITY: 115.38 CM/S
RA PRESSURE: 3 MMHG
RBC # BLD AUTO: 5.08 M/UL (ref 4.6–6.2)
RIGHT VENTRICULAR END-DIASTOLIC DIMENSION: 219 CM
SODIUM SERPL-SCNC: 138 MMOL/L (ref 136–145)
STRESS ECHO POST EXERCISE DUR MIN: 9 MINUTES
SYSTOLIC BLOOD PRESSURE: 124 MMHG
TDI LATERAL: 0.12 M/S
TDI SEPTAL: 0.07 M/S
TDI: 0.1 M/S
TROPONIN I SERPL DL<=0.01 NG/ML-MCNC: <0.03 NG/ML
WBC # BLD AUTO: 5.03 K/UL (ref 3.9–12.7)

## 2022-01-21 PROCEDURE — 93018 CV STRESS TEST I&R ONLY: CPT | Mod: ,,, | Performed by: SPECIALIST

## 2022-01-21 PROCEDURE — 93010 EKG 12-LEAD: ICD-10-PCS | Mod: ,,, | Performed by: SPECIALIST

## 2022-01-21 PROCEDURE — 93306 TTE W/DOPPLER COMPLETE: CPT | Mod: 26,,, | Performed by: SPECIALIST

## 2022-01-21 PROCEDURE — 93017 CV STRESS TEST TRACING ONLY: CPT

## 2022-01-21 PROCEDURE — 93010 ELECTROCARDIOGRAM REPORT: CPT | Mod: ,,, | Performed by: SPECIALIST

## 2022-01-21 PROCEDURE — G0378 HOSPITAL OBSERVATION PER HR: HCPCS

## 2022-01-21 PROCEDURE — 85025 COMPLETE CBC W/AUTO DIFF WBC: CPT | Performed by: NURSE PRACTITIONER

## 2022-01-21 PROCEDURE — 99214 OFFICE O/P EST MOD 30 MIN: CPT | Mod: 25,,, | Performed by: SPECIALIST

## 2022-01-21 PROCEDURE — 93016 NUCLEAR STRESS TEST (CUPID ONLY): ICD-10-PCS | Mod: ,,, | Performed by: SPECIALIST

## 2022-01-21 PROCEDURE — 80048 BASIC METABOLIC PNL TOTAL CA: CPT | Performed by: NURSE PRACTITIONER

## 2022-01-21 PROCEDURE — 25000003 PHARM REV CODE 250: Performed by: NURSE PRACTITIONER

## 2022-01-21 PROCEDURE — 84484 ASSAY OF TROPONIN QUANT: CPT | Performed by: NURSE PRACTITIONER

## 2022-01-21 PROCEDURE — 93018 NUCLEAR STRESS TEST (CUPID ONLY): ICD-10-PCS | Mod: ,,, | Performed by: SPECIALIST

## 2022-01-21 PROCEDURE — 93005 ELECTROCARDIOGRAM TRACING: CPT | Mod: 59 | Performed by: SPECIALIST

## 2022-01-21 PROCEDURE — 99214 PR OFFICE/OUTPT VISIT, EST, LEVL IV, 30-39 MIN: ICD-10-PCS | Mod: 25,,, | Performed by: SPECIALIST

## 2022-01-21 PROCEDURE — A9502 TC99M TETROFOSMIN: HCPCS

## 2022-01-21 PROCEDURE — 93306 ECHO (CUPID ONLY): ICD-10-PCS | Mod: 26,,, | Performed by: SPECIALIST

## 2022-01-21 PROCEDURE — 36415 COLL VENOUS BLD VENIPUNCTURE: CPT | Performed by: NURSE PRACTITIONER

## 2022-01-21 PROCEDURE — 93016 CV STRESS TEST SUPVJ ONLY: CPT | Mod: ,,, | Performed by: SPECIALIST

## 2022-01-21 PROCEDURE — 93306 TTE W/DOPPLER COMPLETE: CPT

## 2022-01-21 RX ORDER — PANTOPRAZOLE SODIUM 40 MG/1
40 TABLET, DELAYED RELEASE ORAL DAILY
Qty: 30 TABLET | Refills: 11 | Status: SHIPPED | OUTPATIENT
Start: 2022-01-21 | End: 2023-09-12 | Stop reason: SDUPTHER

## 2022-01-21 RX ORDER — LOSARTAN POTASSIUM 25 MG/1
25 TABLET ORAL DAILY
Qty: 90 TABLET | Refills: 3 | Status: SHIPPED | OUTPATIENT
Start: 2022-01-22 | End: 2023-08-08 | Stop reason: SDUPTHER

## 2022-01-21 RX ORDER — ATORVASTATIN CALCIUM 40 MG/1
40 TABLET, FILM COATED ORAL NIGHTLY
Qty: 90 TABLET | Refills: 3 | Status: SHIPPED | OUTPATIENT
Start: 2022-01-21 | End: 2023-03-17 | Stop reason: ALTCHOICE

## 2022-01-21 RX ORDER — LOSARTAN POTASSIUM 25 MG/1
25 TABLET ORAL DAILY
Status: DISCONTINUED | OUTPATIENT
Start: 2022-01-22 | End: 2022-01-21 | Stop reason: HOSPADM

## 2022-01-21 RX ADMIN — FAMOTIDINE 20 MG: 20 TABLET, FILM COATED ORAL at 09:01

## 2022-01-21 RX ADMIN — HYDROCHLOROTHIAZIDE 25 MG: 25 TABLET ORAL at 01:01

## 2022-01-21 RX ADMIN — ASPIRIN 81 MG: 81 TABLET, DELAYED RELEASE ORAL at 01:01

## 2022-01-21 NOTE — DISCHARGE INSTRUCTIONS
As we discussed, I do not believe that your heart or lungs are the cause of her chest pain.  It is most likely due to acid reflux.  Please start taking pantoprazole.  I have written prescription for this sent to your pharmacy.  Also because your dizziness, Dr. Flores recommended that you decrease your dose of losartan each day to 25 mg.  Please follow-up with Dr. Bailey as an outpatient to discuss up titrating this medication as needed.

## 2022-01-21 NOTE — ED NOTES
Pt alert and oriented skin warm and dry resp even and unlabored. States he had burning sensation to left chest earlier today. Denies any shortness of breath or nausea/vomiting associated with chest pain. States pain lasted approx 2 mins and resolved on its own. Denies any symptoms at time of assessment.

## 2022-01-21 NOTE — H&P
[facility] Medicine History & Physical Examination   Patient Name: Shelly Gannon Jr.  MRN: [id number]  Patient Class: Emergency   Admission Date: 1/20/2022  3:17 PM  Length of Stay: 0  Attending Physician:   Primary Care Provider: Steven Alba MD  Face-to-Face encounter date: 01/20/2022  Code Status: Full Code  MPOA:  Chief Complaint: Chest Pain (L sided substernal Chest pain x a few days )        Patient information was obtained from patient, past medical records and ER records.   HISTORY OF PRESENT ILLNESS:   Shelly Gannon Jr. is a 46 y.o. old male who  has a past medical history of Allergy, Anxiety, Hypertension, Migraine headache, and Migraine syndrome.. The patient presented to [facility] on 1/20/2022 with a primary complaint of Chest Pain (L sided substernal Chest pain x a few days )  .   46-year-old male presents emergency room complaints of chest pain.  Patient states for the past week he has been having intermittent midsternal chest pain that he describes as a pressure sensation.  The chest pain comes on at rest and with activity.  The patient endorses some mild nausea but no vomiting with the chest discomfort this no diaphoresis fever chills known recent ill exposure.  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 Javier on this visit to get established with a new cardiologist    Past medical history significant for hypertension    REVIEW OF SYSTEMS:   10 Point Review of System was performed and was found to be negative except for that mentioned already in the HPI and   Review of Systems (Negative unless checked off)  Review of Systems   Constitutional: Negative.    HENT: Negative.    Eyes: Negative.    Respiratory: Negative.    Cardiovascular:  Positive for chest pain.   Gastrointestinal: Positive for nausea.   Genitourinary: Negative.    Musculoskeletal: Negative.    Skin: Negative.    Neurological: Negative.    Endo/Heme/Allergies: Negative.    Psychiatric/Behavioral: Negative.         Under lot of situational domestic stress           PAST MEDICAL HISTORY:     Past Medical History:   Diagnosis Date    Allergy     Anxiety     Hypertension     Migraine headache     Migraine syndrome        PAST SURGICAL HISTORY:     Past Surgical History:   Procedure Laterality Date    BACK SURGERY      FRACTURE SURGERY      LEFT HEART CATHETERIZATION Left 9/19/2019    Procedure: CATHETERIZATION, HEART, LEFT  (RIGHT RADIAL ACCESS);  Surgeon: Ronnie Bailey MD;  Location: [facility] CATH/EP LAB;  Service: Cardiology;  Laterality: Left;    LUMBAR DISC SURGERY Bilateral     L4- L5 , 2016 April,  Trans    VOCAL FOLD LESION EXCISION  2008       ALLERGIES:   Ace inhibitors    FAMILY HISTORY:     Family History   Problem Relation Age of Onset    Allergic rhinitis Mother     Hypertension Mother     Heart disease Mother     Stroke Mother     Heart disease Father 49    Cancer Maternal Aunt         breast    Heart disease Maternal Aunt     Angioedema Neg Hx     Asthma Neg Hx     Atopy Neg Hx     Eczema Neg Hx     Immunodeficiency Neg Hx     Urticaria Neg Hx        SOCIAL HISTORY:     Social History     Tobacco Use    Smoking status: Never Smoker    Smokeless tobacco: Never Used   Substance Use Topics    Alcohol use: No        Social History     Substance and Sexual Activity   Sexual Activity Yes    Partners: Female        HOME MEDICATIONS:     Prior to Admission medications    Medication Sig Start Date End Date Taking? Authorizing Provider   aspirin (ECOTRIN) 81 MG EC tablet Take 81 mg by mouth once daily.   Yes Historical Provider   hydroCHLOROthiazide (HYDRODIURIL) 25 MG tablet Take 25 mg by mouth once daily. 1/28/21  Yes Historical Provider   losartan  "(COZAAR) 100 MG tablet Take 100 mg by mouth once daily.  9/12/19  Yes Historical Provider   metoprolol succinate (TOPROL-XL) 25 MG 24 hr tablet Take 25 mg by mouth once daily. 7/18/21  Yes Historical Provider   omeprazole (PRILOSEC) 40 MG capsule Take 1 capsule (40 mg total) by mouth 2 (two) times daily before meals. 1/7/22 1/7/23 Yes Krystin Aranda NP   escitalopram oxalate (LEXAPRO) 10 MG tablet Take 1 tablet (10 mg total) by mouth once daily. 12/9/20 3/9/21  Krystin Aranda NP   ibuprofen (ADVIL,MOTRIN) 800 MG tablet Take 1 tablet (800 mg total) by mouth every 6 (six) hours as needed for Pain. 8/29/18   Cronell Ruggiero MD   LORazepam (ATIVAN) 1 MG tablet Take 1 mg by mouth every 12 (twelve) hours as needed for Anxiety.    Historical Provider   naproxen (NAPROSYN) 500 MG tablet Take 1 tablet (500 mg total) by mouth 2 (two) times daily with meals. 1/28/21   Vinod Giles MD   naratriptan (AMERGE) 2.5 MG tablet 2.5 mg at onset of headache,  Repeat in 4 hrs x 3 days 2/5/21 3/7/21  Steven Alba MD   ondansetron (ZOFRAN-ODT) 4 MG TbDL Take 2 tablets (8 mg total) by mouth every 6 (six) hours as needed. 9/13/19   Krystin Aranda NP         PHYSICAL EXAM:   /74   Pulse 65   Temp 98.6 °F (37 °C) (Oral)   Resp 18   Ht 5' 11.5" (1.816 m)   Wt 106.6 kg (235 lb)   SpO2 97%   BMI 32.32 kg/m²   Vitals Reviewed  General appearance: Well-developed, well-nourished male in no apparent distress.  Skin: No Rash.   Neuro: Motor and sensory exams grossly intact. Good tone. Power in all 4 extremities 5/5.   HENT: Atraumatic head. Moist mucous membranes of oral cavity.  Eyes: Normal extraocular movements.   Neck: Supple. No evidence of lymphadenopathy. No thyroidomegaly.  Lungs: Clear to auscultation bilaterally. No wheezing present.   Heart: Regular rate and rhythm. S1 and S2 present with no murmurs/gallop/rub. No pedal edema. No JVD present.   Abdomen: Soft, non-distended, non-tender. No rebound " tenderness/guarding. No masses or organomegaly. Bowel sounds are normal. Bladder is not palpable.   Extremities: No cyanosis, clubbing, or edema.  Psych/mental status: Alert and oriented. Cooperative. Responds appropriately to questions.   EMERGENCY DEPARTMENT LABS AND IMAGING:   Following labs were Reviewed   Recent Labs   Lab 01/20/22 1759 01/20/22 1904   WBC 4.46  --    HGB 16.7  --    HCT 49.5  --      --    CALCIUM  --  8.3*   ALBUMIN  --  3.9   PROT  --  7.0   NA  --  139   K  --  3.4*   CO2  --  27   CL  --  106   BUN  --  12   CREATININE  --  1.1   ALKPHOS  --  40*   ALT  --  26   AST  --  17   BILITOT  --  0.7         BMP:   Recent Labs   Lab 01/20/22 1904   GLU 88      K 3.4*      CO2 27   BUN 12   CREATININE 1.1   CALCIUM 8.3*   , CMP   Recent Labs   Lab 01/20/22 1904      K 3.4*      CO2 27   GLU 88   BUN 12   CREATININE 1.1   CALCIUM 8.3*   PROT 7.0   ALBUMIN 3.9   BILITOT 0.7   ALKPHOS 40*   AST 17   ALT 26   ANIONGAP 6*   ESTGFRAFRICA >60.0   EGFRNONAA >60.0   , CBC   Recent Labs   Lab 01/20/22 1759   WBC 4.46   HGB 16.7   HCT 49.5      , INR   Lab Results   Component Value Date    INR 1.1 01/28/2021    INR 1.0 09/17/2019    INR 1.1 08/26/2019   , Lipid Panel   Lab Results   Component Value Date    CHOL 203 (H) 12/09/2020    HDL 31 (L) 12/09/2020    LDLCALC 128.0 12/09/2020    TRIG 220 (H) 12/09/2020    CHOLHDL 15.3 (L) 12/09/2020   , Troponin   Recent Labs   Lab 01/20/22 1904   TROPONINI <0.030   , A1C: No results for input(s): HGBA1C in the last 4320 hours. and All labs within the past 24 hours have been reviewed  Microbiology Results (last 7 days)     ** No results found for the last 168 hours. **        X-Ray Chest AP Portable   Final Result        X-Ray Chest AP Portable    Result Date: 1/20/2022  XR CHEST 1 VIEW CLINICAL HISTORY: 46 years Male Chest Pain COMPARISON: 9/17/2019 FINDINGS: Cardiomediastinal silhouette is within normal limits. Lungs are  normally expanded with no airspace consolidation. No pleural effusion or pneumothorax. No acute osseous abnormality. IMPRESSION: No acute pulmonary process. Electronically signed by:  Rocio Stone MD  1/20/2022 5:03 PM [facility] Workstation: [id number]    I personally reviewed and agree with the radiologist's findings    12 lead EKG reveals a normal sinus rhythm with a normal axis this good R-wave progression this ST depression and T-wave inversion in the inferior lateral leads.  The ST depression T-wave inversion in the lateral leads appears to be different from a prior EKG  ASSESSMENT & PLAN:   Shelly Gannon Jr. is a 46 y.o. male admitted for    1.  Chest pain  -trend cardiac enzymes and troponin  -2D echo complete  -aspirin beta-blocker statin p.r.n. nitrates  -had angiogram/left heart catheterization in 2019 revealing a myocardial bridge treated with medication at that time  -patient is requesting to see Dr. Concepcion topete new cardiologist    2, essential hypertension  -continue home medications to manage    3.  Mild hypokalemia   -correction given in the ED    4, Hyperlipidemia  -statin started    DVT Prophylaxis: will be placed on SCDs for DVT prophylaxis and will be advised to be as mobile as possible and sit in a chair as tolerated.   _______________________________________________________________  Face-to-Face encounter date: 01/20/2022  Encounter included review of the medical records, interviewing and examining the patient face-to-face, discussion with family and other health care providers including emergency medicine physician, admission orders, interpreting lab/test results and formulating a plan of care.   Medical Decision Making during this encounter was  [_] Low Complexity  [_] Moderate Complexity  [x] High Complexity  _________________________________________________________________________________    INPATIENT LIST OF MEDICATIONS     Current Facility-Administered Medications:     aspirin  tablet 325 mg, 325 mg, Oral, ED 1 Time, Laura Duran MD    Current Outpatient Medications:     aspirin (ECOTRIN) 81 MG EC tablet, Take 81 mg by mouth once daily., Disp: , Rfl:     hydroCHLOROthiazide (HYDRODIURIL) 25 MG tablet, Take 25 mg by mouth once daily., Disp: , Rfl:     losartan (COZAAR) 100 MG tablet, Take 100 mg by mouth once daily. , Disp: , Rfl:     metoprolol succinate (TOPROL-XL) 25 MG 24 hr tablet, Take 25 mg by mouth once daily., Disp: , Rfl:     omeprazole (PRILOSEC) 40 MG capsule, Take 1 capsule (40 mg total) by mouth 2 (two) times daily before meals., Disp: 180 capsule, Rfl: 3    escitalopram oxalate (LEXAPRO) 10 MG tablet, Take 1 tablet (10 mg total) by mouth once daily., Disp: 90 tablet, Rfl: 0    ibuprofen (ADVIL,MOTRIN) 800 MG tablet, Take 1 tablet (800 mg total) by mouth every 6 (six) hours as needed for Pain., Disp: 20 tablet, Rfl: 0    LORazepam (ATIVAN) 1 MG tablet, Take 1 mg by mouth every 12 (twelve) hours as needed for Anxiety., Disp: , Rfl:     naproxen (NAPROSYN) 500 MG tablet, Take 1 tablet (500 mg total) by mouth 2 (two) times daily with meals., Disp: 30 tablet, Rfl: 0    naratriptan (AMERGE) 2.5 MG tablet, 2.5 mg at onset of headache,  Repeat in 4 hrs x 3 days, Disp: 9 tablet, Rfl: 3    ondansetron (ZOFRAN-ODT) 4 MG TbDL, Take 2 tablets (8 mg total) by mouth every 6 (six) hours as needed., Disp: 20 tablet, Rfl: 1    Facility-Administered Medications Ordered in Other Encounters:     0.9%  NaCl infusion, , Intravenous, Continuous, Ronnie Bailey MD, Last Rate: 75 mL/hr at 09/19/19 0930, New Bag at 09/19/19 0930      Scheduled Meds:   aspirin  325 mg Oral ED 1 Time     Continuous Infusions:  PRN Meds:.      Karis Sam  [facility] Hospitalist NP  01/20/2022

## 2022-01-21 NOTE — ED PROVIDER NOTES
Encounter Date: 1/20/2022       History     Chief Complaint   Patient presents with    Chest Pain     L sided substernal Chest pain x a few days      46-year-old male with a history of hypertension migraines presents the emergency department chest pain.  The patient states that over the past 2 days he has had intermittent, left-sided, burning chest pains.  The pain does not radiate.  Is not associated with shortness of breath, nausea or diaphoresis.  He endorses increased stress recently.  He does not smoke cigarettes.  He denies any significant family history of cardiac disease.        Review of patient's allergies indicates:   Allergen Reactions    Ace inhibitors Other (See Comments)     cough     Past Medical History:   Diagnosis Date    Allergy     Anxiety     Hypertension     Migraine headache     Migraine syndrome      Past Surgical History:   Procedure Laterality Date    BACK SURGERY      FRACTURE SURGERY      LEFT HEART CATHETERIZATION Left 9/19/2019    Procedure: CATHETERIZATION, HEART, LEFT  (RIGHT RADIAL ACCESS);  Surgeon: Ronnie Bailey MD;  Location[facility] CATH/EP LAB;  Service: Cardiology;  Laterality: Left;    LUMBAR DISC SURGERY Bilateral     L4- L5 , 2016 April,  Trans    VOCAL FOLD LESION EXCISION  2008     Family History   Problem Relation Age of Onset    Allergic rhinitis Mother     Hypertension Mother     Heart disease Mother     Stroke Mother     Heart disease Father 49    Cancer Maternal Aunt         breast    Heart disease Maternal Aunt     Angioedema Neg Hx     Asthma Neg Hx     Atopy Neg Hx     Eczema Neg Hx     Immunodeficiency Neg Hx     Urticaria Neg Hx      Social History     Tobacco Use    Smoking status: Never Smoker    Smokeless tobacco: Never Used   Substance Use Topics    Alcohol use: No    Drug use: No     Review of Systems   Constitutional: Negative for chills and fever.   HENT: Negative for sore throat.    Respiratory: Negative for shortness of  breath.    Cardiovascular: Positive for chest pain.   Gastrointestinal: Negative for nausea.   Genitourinary: Negative for dysuria.   Musculoskeletal: Negative for back pain.   Skin: Negative for rash.   Neurological: Negative for weakness.   Hematological: Does not bruise/bleed easily.   Psychiatric/Behavioral: Negative for confusion.   All other systems reviewed and are negative.      Physical Exam     Initial Vitals [01/20/22 1520]   BP Pulse Resp Temp SpO2   (!) 169/83 82 18 98.6 °F (37 °C) 100 %      MAP       --         Physical Exam    Nursing note and vitals reviewed.  Constitutional: He appears well-developed and well-nourished. No distress.   HENT:   Head: Normocephalic and atraumatic.   Eyes: EOM are normal.   Neck:   Normal range of motion.  Cardiovascular: Normal rate, regular rhythm, normal heart sounds and intact distal pulses.   No murmur heard.  Pulmonary/Chest: Breath sounds normal. No respiratory distress. He has no wheezes. He has no rales.   Abdominal: Abdomen is soft. There is no abdominal tenderness.   Musculoskeletal:         General: Normal range of motion.      Cervical back: Normal range of motion.     Neurological: He is alert and oriented to person, place, and time. GCS score is 15. GCS eye subscore is 4. GCS verbal subscore is 5. GCS motor subscore is 6.   Skin: Skin is warm and dry.   Psychiatric: He has a normal mood and affect.         ED Course   Procedures  Labs Reviewed   COMPREHENSIVE METABOLIC PANEL - Abnormal; Notable for the following components:       Result Value    Potassium 3.4 (*)     Calcium 8.3 (*)     Alkaline Phosphatase 40 (*)     Anion Gap 6 (*)     All other components within normal limits    Narrative:     Recoll. [id number] by [email or web address] at 01/20/2022 18:57, reason: Specimen   hemolyzed. NOTIFIED TENZIN RECEPTION IN ED  01/20/2022  18:57   FGF   CBC W/ AUTO DIFFERENTIAL   TROPONIN I    Narrative:     Recoll. [id number] by [email or web address] at 01/20/2022 18:57, reason: Specimen    hemolyzed. NOTIFIED TENZIN RECEPTION IN ED  01/20/2022  18:57   FGF   SARS-COV-2 RNA AMPLIFICATION, QUAL   MAGNESIUM     EKG Readings: (Independently Interpreted)   EKG is normal sinus rhythm at a rate of 75 beats per minute with no ST elevation, T-wave inversions in inferior leads is unchanged but new T-wave inversions in V4 through V6     ECG Results          EKG 12-lead (In process)  Result time 01/20/22 15:29:03    In process by Interface, Lab In [facility] (01/20/22 15:29:03)                 Narrative:    Test Reason : R07.9,    Vent. Rate : 075 BPM     Atrial Rate : 075 BPM     P-R Int : 148 ms          QRS Dur : 088 ms      QT Int : 374 ms       P-R-T Axes : 070 082 -37 degrees     QTc Int : 417 ms    Normal sinus rhythm  T wave abnormality, consider inferolateral ischemia  Abnormal ECG  When compared with ECG of 26-AUG-2019 12:59,  T wave inversion more evident in Inferior leads  Inverted T waves have replaced nonspecific T wave abnormality in Lateral  leads    Referred By:             Confirmed By:                             Imaging Results          X-Ray Chest AP Portable (Final result)  Result time 01/20/22 17:03:33    Final result by Rocio Stone MD (01/20/22 17:03:33)                 Narrative:    XR CHEST 1 VIEW    CLINICAL HISTORY:  46 years Male Chest Pain    COMPARISON: 9/17/2019    FINDINGS: Cardiomediastinal silhouette is within normal limits. Lungs are normally expanded with no airspace consolidation. No pleural effusion or pneumothorax. No acute osseous abnormality.    IMPRESSION: No acute pulmonary process.    Electronically signed by:  Rocio Stone MD  1/20/2022 5:03 PM CST Workstation: [id number]                            X-Rays:   Independently Interpreted Readings:   Chest X-Ray: Normal heart size.  No infiltrates.  No acute abnormalities.     Medications - No data to display  Medical Decision Making:   ED Management:  46-year-old male presents emergency department with chest  pain. Ddx includes angina, ACS, PE, dissection, PNA, pneumothorax, MSK pain, rib fracture, anxiety, pericardial effusion.    Patient has new T-wave inversions in lead V4-6 which is concerning for ischemia.  The rest was workup is unremarkable.  Will admit for further evaluation.    Laura Duran MD  Emergency Medicine  01/20/2022 8:56 PM                        Clinical Impression:   Final diagnoses:  [R07.9] Chest pain (Primary)  [R94.31] Abnormal ECG          ED Disposition Condition    Admit               Laura Duran MD  01/20/22 2056

## 2022-01-21 NOTE — HOSPITAL COURSE
46-year-old man with history of angiogram, which revealed myocardial bridge observed overnight after episodes of moderate burning substernal chest pain.  Patient EKG and chest x-ray were unrevealing.  Troponins were not elevated.  Nuclear medicine Lexiscan was negative for ischemia.  Patient's chest pain is thought to be due to GERD at this time.  Patient be started on Protonix and has been advised to follow-up with his cardiologist as needed.  Note, patient will reduce his dose of losartan each morning and may discuss up titrating from there as tolerated during follow-up with Cardiology.    Physical Exam:  GENERAL: well built, well nourished, well-developed in no apparent distress alert and oriented.   NECK: No JVD. No bruit.   CARDIAC: Regular rate and rhythm. S1 is normal.S2 is normal.No gallops, clicks or murmurs noted at this time.  Peripheral pulses intact.  LUNGS: Clear to auscultation. No wheezing or rhonchi..   ABDOMEN: Soft no masses or organomegaly.  No abdomen pulsations or bruits.  Normal bowel sounds. No pulsations and no masses felt, No guarding or rebound.   EXTREMITIES: No cyanosis, clubbing or edema noted at this time., no calf tenderness bilaterally.   CENTRAL NERVOUS SYSTEM: No focal motor or sensory deficits noted.   SKIN: Skin without lesions, moist, well perfused.   MUSCLE STRENGTH & TONE: No noteable weakness, atrophy or abnormal movement.

## 2022-01-21 NOTE — DISCHARGE SUMMARY
[facility] Medicine  Discharge Summary      Patient Name: Shelly Gannon Jr.  MRN: [id number]  Patient Class: OP- Observation  Admission Date: 1/20/2022  Hospital Length of Stay: 0 days  Discharge Date and Time:  01/21/2022 4:42 PM  Attending Physician: No att. providers found   Discharging Provider: Leonard Islas MD  Primary Care Provider: Steven Alba MD      HPI:   No notes on file    * No surgery found *      Hospital Course:   46-year-old man with history of angiogram, which revealed myocardial bridge observed overnight after episodes of moderate burning substernal chest pain.  Patient EKG and chest x-ray were unrevealing.  Troponins were not elevated.  Nuclear medicine Lexiscan was negative for ischemia.  Patient's chest pain is thought to be due to GERD at this time.  Patient be started on Protonix and has been advised to follow-up with his cardiologist as needed.  Note, patient will reduce his dose of losartan each morning and may discuss up titrating from there as tolerated during follow-up with Cardiology.    Physical Exam:  GENERAL: well built, well nourished, well-developed in no apparent distress alert and oriented.   NECK: No JVD. No bruit.   CARDIAC: Regular rate and rhythm. S1 is normal.S2 is normal.No gallops, clicks or murmurs noted at this time.  Peripheral pulses intact.  LUNGS: Clear to auscultation. No wheezing or rhonchi..   ABDOMEN: Soft no masses or organomegaly.  No abdomen pulsations or bruits.  Normal bowel sounds. No pulsations and no masses felt, No guarding or rebound.   EXTREMITIES: No cyanosis, clubbing or edema noted at this time., no calf tenderness bilaterally.   CENTRAL NERVOUS SYSTEM: No focal motor or sensory deficits noted.   SKIN: Skin without lesions, moist, well perfused.   MUSCLE STRENGTH & TONE: No noteable weakness, atrophy or abnormal movement.       Goals of Care Treatment Preferences:  Code Status: Full Code      Consults:   Consults  (From admission, onward)        Status Ordering Provider     Inpatient consult to Cardiology  Once        Provider:  Marcus Flores MD    Completed [address]     Inpatient consult to Hospitalist  Once        Provider:  Karis Street NP    Acknowledged ETHEL BARNETT          No new Assessment & Plan notes have been filed under this hospital service since the last note was generated.  Service: Hospital Medicine    Final Active Diagnoses:    Diagnosis Date Noted POA    PRINCIPAL PROBLEM:  Chest pain [R07.9] 09/19/2019 Unknown      Problems Resolved During this Admission:       Discharged Condition: fair    Disposition: Home or Self Care    Follow Up:   Follow-up Information     Ronnie Bailey MD. Schedule an appointment as soon as possible for a visit in 1 month.    Specialties: Cardiology, Interventional Cardiology  Why: Please make an appointment as needed to see Dr. Bailey  Contact information:  [address]  [address]  [facility] [place] [address]  [phone]                       Patient Instructions:      Diet Cardiac     Activity as tolerated       Significant Diagnostic Studies: Labs:   BMP:   Recent Labs   Lab 01/20/22  1904 01/21/22  0418   GLU 88 100    138   K 3.4* 3.6    103   CO2 27 28   BUN 12 14   CREATININE 1.1 1.3   CALCIUM 8.3* 9.1   MG 2.5  --    , CBC   Recent Labs   Lab 01/20/22  1759 01/20/22  1759 01/21/22  0418   WBC 4.46  --  5.03   HGB 16.7  --  15.1   HCT 49.5   < > 44.2     --  156    < > = values in this interval not displayed.    and Troponin   Recent Labs   Lab 01/20/22 1904 01/20/22  2217 01/21/22  0418   TROPONINI <0.030 <0.030 <0.030       Pending Diagnostic Studies:     Procedure Component Value Units Date/Time    Echo [[id number]]     Order Status: Sent Lab Status: No result     Echo Saline Bubble? No [[id number]]  (Abnormal) Resulted: 01/21/22 1232    Order Status: Sent Lab Status: In process Updated: 01/21/22 1232     BSA 2.32  m2      TDI SEPTAL 0.07 m/s      LV LATERAL E/E' RATIO 3.83 m/s      LV SEPTAL E/E' RATIO 6.57 m/s      AORTIC VALVE CUSP SEPERATION 2.45 cm      TDI LATERAL 0.12 m/s      PV PEAK VELOCITY 115.38 cm/s      LVIDd 4.88 cm      IVS 1.37 cm      Posterior Wall 1.37 cm      Ao root annulus 3.20 cm      LVIDs 3.43 cm      FS 30 %      LV mass 272.03 g      LA size 3.91 cm      RVDD 219.00 cm      Left Ventricle Relative Wall Thickness 0.56 cm      AV mean gradient 3 mmHg      AV valve area 4.04 cm2      AV Velocity Ratio 76.58     AV index (prosthetic) 0.79     E/A ratio 1.21     Mean e' 0.10 m/s      E wave deceleration time 240.35 msec      IVRT 87.02 msec      LVOT diameter 2.56 cm      LVOT area 5.1 cm2      LVOT peak tyree 86.53 m/s      LVOT peak VTI 15.29 cm      Ao peak tyree 1.13 m/s      Ao VTI 19.45 cm      LVOT stroke volume 78.66 cm3      AV peak gradient 5 mmHg      E/E' ratio 4.84 m/s      MV Peak E Tyree 0.46 m/s      MV Peak A Tyree 0.38 m/s      LV Mass Index 120 g/m2          Medications:  Reconciled Home Medications:      Medication List      START taking these medications    atorvastatin 40 MG tablet  Commonly known as: LIPITOR  Take 1 tablet (40 mg total) by mouth every evening.     pantoprazole 40 MG tablet  Commonly known as: PROTONIX  Take 1 tablet (40 mg total) by mouth once daily.        CHANGE how you take these medications    losartan 25 MG tablet  Commonly known as: COZAAR  Take 1 tablet (25 mg total) by mouth once daily.  Start taking on: January 22, 2022  What changed:   · medication strength  · how much to take        CONTINUE taking these medications    aspirin 81 MG EC tablet  Commonly known as: ECOTRIN  Take 81 mg by mouth once daily.     hydroCHLOROthiazide 25 MG tablet  Commonly known as: HYDRODIURIL  Take 25 mg by mouth once daily.     metoprolol succinate 25 MG 24 hr tablet  Commonly known as: TOPROL-XL  Take 25 mg by mouth once daily.        STOP taking these medications    EScitalopram  oxalate 10 MG tablet  Commonly known as: LEXAPRO     ibuprofen 800 MG tablet  Commonly known as: ADVIL,MOTRIN     LORazepam 1 MG tablet  Commonly known as: ATIVAN     naproxen 500 MG tablet  Commonly known as: NAPROSYN     naratriptan 2.5 MG tablet  Commonly known as: AMERGE     omeprazole 40 MG capsule  Commonly known as: PRILOSEC     ondansetron 4 MG Tbdl  Commonly known as: ZOFRAN-ODT            Indwelling Lines/Drains at time of discharge:   Lines/Drains/Airways     None                 Time spent on the discharge of patient: 25 minutes         Leonard Islas MD  Department of Hospital Medicine  [facility]

## 2022-01-21 NOTE — CONSULTS
[facility]  Department of Cardiology  Consult Note      PATIENT NAME: Shelly Gannon Jr.  MRN: [id number]  TODAY'S DATE: 01/21/2022  ADMIT DATE: 1/20/2022                          CONSULT REQUESTED BY: Leonard Islas MD    SUBJECTIVE     PRINCIPAL PROBLEM: Chest pain      REASON FOR CONSULT:  Chest pain     HPI:  46-year-old male with a past medical history of high blood pressure and myocardial bridge seen on left heart catheterization in 2019 by Dr. Bailey who presented to the ED with intermittent chest pains.  He describes the chest pain as a burning sensation that comes and goes and is resolved on its own in 1-2 minutes.  Meals, Activity or rest does not make a difference.  He does also have acid reflux his Prilosec only helps occasionally.  He has no shortness of breath, belching, nausea or vomiting, neck arm or jaw pain, swelling in lower extremities, dizziness, or syncope.  His troponins have been negative x3 and his EKG shows nonspecific T-wave abnormalities that is slightly worse than seen on previous EKG in 2019. There was question over his medication compliance as he says he only takes his hypertensive medications when he notices his pressures are very elevated.  He then stops taking them at certain points because he feels as if his blood pressure is too low.      From H and P:  Shelly Gannon Jr. is a 46 y.o. old male who  has a past medical history of Allergy, Anxiety, Hypertension, Migraine headache, and Migraine syndrome.. The patient presented to [facility] on 1/20/2022 with a primary complaint of Chest Pain (L sided substernal Chest pain x a few days )  .   46-year-old male presents emergency room complaints of chest pain.  Patient states for the past week he has been having intermittent midsternal chest pain that he describes as a pressure sensation.  The chest pain comes on at rest and with activity.  The patient endorses some mild nausea but no vomiting  with the chest discomfort this no diaphoresis fever chills known recent ill exposure.  He describes symptoms as moderate to severe severity     This patient had angiogram in 2019 revealing a myocardial bridge that was treated medically only     The patient also states he is under lot of situational stress     The patient is requesting to see Dr. Javier on this visit to get established with a new cardiologist     Past medical history significant for hypertension    Review of patient's allergies indicates:   Allergen Reactions    Ace inhibitors Other (See Comments)     cough       Past Medical History:   Diagnosis Date    Allergy     Anxiety     Hypertension     Migraine headache     Migraine syndrome      Past Surgical History:   Procedure Laterality Date    BACK SURGERY      FRACTURE SURGERY      LEFT HEART CATHETERIZATION Left 9/19/2019    Procedure: CATHETERIZATION, HEART, LEFT  (RIGHT RADIAL ACCESS);  Surgeon: Ronnie Bailey MD;  Location: [facility] CATH/EP LAB;  Service: Cardiology;  Laterality: Left;    LUMBAR DISC SURGERY Bilateral     L4- L5 , 2016 April,  Trans    VOCAL FOLD LESION EXCISION  2008     Social History     Tobacco Use    Smoking status: Never Smoker    Smokeless tobacco: Never Used   Substance Use Topics    Alcohol use: No    Drug use: No        REVIEW OF SYSTEMS  CONSTITUTIONAL: Negative for chills, fatigue and fever.   EYES: No double vision, No blurred vision  NEURO: No headaches, No dizziness  RESPIRATORY: Negative for cough, shortness of breath and wheezing.    CARDIOVASCULAR: Positive for chest pain. Negative for palpitations and leg swelling.   GI: Negative for abdominal pain, No melena, diarrhea, nausea and vomiting.   : Negative for hematuria  SKIN: Negative for bruising, Negative for edema or discoloration noted.   MUSCULOSKELETAL: Negative for neck pain, Negative for muscle weakness, Negative for back pain     OBJECTIVE     VITAL SIGNS (Most Recent)  Temp: 98.2 °F  (36.8 °C) (01/21/22 0747)  Pulse: (!) 53 (01/21/22 0747)  Resp: 20 (01/21/22 0747)  BP: 109/74 (01/21/22 0747)  SpO2: 97 % (01/21/22 0747)    VENTILATION STATUS  Resp: 20 (01/21/22 0747)  SpO2: 97 % (01/21/22 0747)       I & O (Last 24H):    Intake/Output Summary (Last 24 hours) at 1/21/2022 0940  Last data filed at 1/20/2022 2300  Gross per 24 hour   Intake --   Output 1 ml   Net -1 ml       WEIGHTS  Wt Readings from Last 3 Encounters:   01/20/22 1520 106.6 kg (235 lb)   01/01/22 2110 106.6 kg (235 lb)   09/17/21 1419 109.8 kg (242 lb 1 oz)       PHYSICAL EXAM  GENERAL: well built, well nourished, well-developed in no apparent distress alert and oriented.   HEENT: Normocephalic. Pupils normal and conjunctivae normal.  Mucous membranes normal, no cyanosis or icterus, trachea central,no pallor or icterus is noted..   NECK: No JVD. No bruit..   THYROID: Thyroid not enlarged. No nodules present..   CARDIAC: Regular rate and rhythm. S1 is normal.S2 is normal.No gallops, clicks or murmurs noted at this time.  CHEST ANATOMY: normal.   LUNGS: Clear to auscultation. No wheezing or rhonchi..   ABDOMEN: Soft no masses or organomegaly.  No abdomen pulsations or bruits.  Normal bowel sounds. No pulsations and no masses felt, No guarding or rebound.   URINARY: No hammond catheter   EXTREMITIES: No cyanosis, clubbing or edema noted at this time., no calf tenderness bilaterally.   PERIPHERAL VASCULAR SYSTEM: Good palpable distal pulses.   CENTRAL NERVOUS SYSTEM: No focal motor or sensory deficits noted.   SKIN: Skin without lesions, moist, well perfused.   MUSCLE STRENGTH & TONE: No noteable weakness, atrophy or abnormal movement.     HOME MEDICATIONS:  Current Facility-Administered Medications on File Prior to Encounter   Medication Dose Route Frequency Provider Last Rate Last Admin    0.9%  NaCl infusion   Intravenous Continuous Ronnie Bailey MD 75 mL/hr at 09/19/19 0930 New Bag at 09/19/19 7086     Current Outpatient  Medications on File Prior to Encounter   Medication Sig Dispense Refill    aspirin (ECOTRIN) 81 MG EC tablet Take 81 mg by mouth once daily.      hydroCHLOROthiazide (HYDRODIURIL) 25 MG tablet Take 25 mg by mouth once daily.      losartan (COZAAR) 100 MG tablet Take 100 mg by mouth once daily.       metoprolol succinate (TOPROL-XL) 25 MG 24 hr tablet Take 25 mg by mouth once daily.      omeprazole (PRILOSEC) 40 MG capsule Take 1 capsule (40 mg total) by mouth 2 (two) times daily before meals. 180 capsule 3    escitalopram oxalate (LEXAPRO) 10 MG tablet Take 1 tablet (10 mg total) by mouth once daily. 90 tablet 0    ibuprofen (ADVIL,MOTRIN) 800 MG tablet Take 1 tablet (800 mg total) by mouth every 6 (six) hours as needed for Pain. 20 tablet 0    LORazepam (ATIVAN) 1 MG tablet Take 1 mg by mouth every 12 (twelve) hours as needed for Anxiety.      naproxen (NAPROSYN) 500 MG tablet Take 1 tablet (500 mg total) by mouth 2 (two) times daily with meals. 30 tablet 0    naratriptan (AMERGE) 2.5 MG tablet 2.5 mg at onset of headache,  Repeat in 4 hrs x 3 days 9 tablet 3    ondansetron (ZOFRAN-ODT) 4 MG TbDL Take 2 tablets (8 mg total) by mouth every 6 (six) hours as needed. 20 tablet 1       SCHEDULED MEDS:   aspirin  81 mg Oral Daily    atorvastatin  40 mg Oral QHS    famotidine  20 mg Oral BID    hydroCHLOROthiazide  25 mg Oral Daily    losartan  100 mg Oral Daily    metoprolol succinate  25 mg Oral Daily       CONTINUOUS INFUSIONS:    PRN MEDS:acetaminophen, melatonin, morphine, nitroGLYCERIN, ondansetron, sodium chloride 0.9%    LABS AND DIAGNOSTICS     CBC LAST 3 DAYS  Recent Labs   Lab 01/20/22  1759 01/21/22  0418   WBC 4.46 5.03   RBC 5.66 5.08   HGB 16.7 15.1   HCT 49.5 44.2   MCV 88 87   MCH 29.5 29.7   MCHC 33.7 34.2   RDW 13.5 13.4    156   MPV 12.7 11.0   GRAN 59.9  2.7 49.1  2.5   LYMPH 28.7  1.3 39.6  2.0   MONO 9.2  0.4 8.9  0.5   BASO 0.03 0.03   NRBC 0 0       COAGULATION LAST 3  DAYS  No results for input(s): LABPT, INR, APTT in the last 168 hours.    CHEMISTRY LAST 3 DAYS  Recent Labs   Lab 01/20/22  1904 01/21/22  0418    138   K 3.4* 3.6    103   CO2 27 28   ANIONGAP 6* 7*   BUN 12 14   CREATININE 1.1 1.3   GLU 88 100   CALCIUM 8.3* 9.1   MG 2.5  --    ALBUMIN 3.9  --    PROT 7.0  --    ALKPHOS 40*  --    ALT 26  --    AST 17  --    BILITOT 0.7  --        CARDIAC PROFILE LAST 3 DAYS  Recent Labs   Lab 01/20/22  1904 01/20/22 2217 01/21/22  0418   TROPONINI <0.030 <0.030 <0.030       ENDOCRINE LAST 3 DAYS  No results for input(s): TSH, PROCAL in the last 168 hours.    LAST ARTERIAL BLOOD GAS  ABG  No results for input(s): PH, PO2, PCO2, HCO3, BE in the last 168 hours.    LAST 7 DAYS MICROBIOLOGY   Microbiology Results (last 7 days)     ** No results found for the last 168 hours. **          MOST RECENT IMAGING  X-Ray Chest AP Portable  XR CHEST 1 VIEW    CLINICAL HISTORY:  46 years Male Chest Pain    COMPARISON: 9/17/2019    FINDINGS: Cardiomediastinal silhouette is within normal limits. Lungs are normally expanded with no airspace consolidation. No pleural effusion or pneumothorax. No acute osseous abnormality.    IMPRESSION: No acute pulmonary process.    Electronically signed by:  Rocio Stone MD  1/20/2022 5:03 PM CST Workstation: [id number]      ECHOCARDIOGRAM RESULTS (last 5)  No results found for this or any previous visit.      CURRENT/PREVIOUS VISIT EKG  Results for orders placed or performed during the hospital encounter of 01/20/22   EKG 12-lead    Collection Time: 01/21/22  7:21 AM    Narrative    Test Reason : R07.9,    Vent. Rate : 062 BPM     Atrial Rate : 062 BPM     P-R Int : 174 ms          QRS Dur : 086 ms      QT Int : 424 ms       P-R-T Axes : 032 -09 -11 degrees     QTc Int : 430 ms    Normal sinus rhythm  Cannot rule out Anterior infarct ,age undetermined  Abnormal ECG  When compared with ECG of 20-JAN-2022 15:24,  Questionable change in The  axis  Nonspecific T wave abnormality has replaced inverted T waves in Lateral  leads    Referred By: [email or web address]   SELF           Confirmed By:            ASSESSMENT/PLAN:     Active Hospital Problems    Diagnosis    *Chest pain       ASSESSMENT & PLAN:   1. Chest pain  - EKG with anterior lateral ischemia now more pronounced than on EKG in 2019  - Troponins have been negative  - Patient is currently chest pain free  - Keep NPO for MPI     2. Essential HTN  - Losartan may need to be decreased as patient reports dizziness when he takes all his meds at once  - Continue HCTZ 25 mg daily   - Continue Toprol XL 25 mg daily     3. Hx of myocardial bridge   - continue medical management   - can be followed outpatient     RECOMMENDATIONS:  NPO for MPI   Consider decreasing losartan to 25 mg daily and then up titrate as tolerated   Echo pending     Ibis Villegas PA-C  [facility]  Department of Cardiology  Date of Service: 01/21/2022        I have personally interviewed and examined the patient, I have reviewed the Physician Assistant's history and physical, assessment, and plan. I agree with the findings and plan.      Marcus Flores M.D.  [facility]  Department of Cardiology  Date of Service: 01/21/2022  9:40 AM

## 2022-01-22 NOTE — PLAN OF CARE
01/22/22 0850   Final Note   Assessment Type Final Discharge Note   Anticipated Discharge Disposition Home   Orders reviewed - pt discharged home 1/21/22 with no case management needs noted.

## 2022-04-20 ENCOUNTER — HOSPITAL ENCOUNTER (EMERGENCY)
Facility: HOSPITAL | Age: 47
Discharge: HOME | End: 2022-04-21
Attending: EMERGENCY MEDICINE
Payer: MEDICAID

## 2022-04-20 DIAGNOSIS — G43.809 OTHER MIGRAINE WITHOUT STATUS MIGRAINOSUS, NOT INTRACTABLE: Primary | ICD-10-CM

## 2022-04-20 PROCEDURE — 99284 EMERGENCY DEPT VISIT MOD MDM: CPT | Mod: 25

## 2022-04-20 PROCEDURE — 96374 THER/PROPH/DIAG INJ IV PUSH: CPT

## 2022-04-20 PROCEDURE — 63600175 PHARM REV CODE 636 W HCPCS: Performed by: PHYSICIAN ASSISTANT

## 2022-04-20 RX ORDER — HYDRALAZINE HYDROCHLORIDE 20 MG/ML
10 INJECTION INTRAMUSCULAR; INTRAVENOUS
Status: COMPLETED | OUTPATIENT
Start: 2022-04-20 | End: 2022-04-20

## 2022-04-20 RX ORDER — BUTALBITAL, ACETAMINOPHEN AND CAFFEINE 50; 325; 40 MG/1; MG/1; MG/1
2 TABLET ORAL ONCE
Status: COMPLETED | OUTPATIENT
Start: 2022-04-21 | End: 2022-04-21

## 2022-04-20 RX ADMIN — HYDRALAZINE HYDROCHLORIDE 10 MG: 20 INJECTION INTRAMUSCULAR; INTRAVENOUS at 11:04

## 2022-04-21 VITALS
DIASTOLIC BLOOD PRESSURE: 91 MMHG | HEART RATE: 50 BPM | OXYGEN SATURATION: 97 % | RESPIRATION RATE: 20 BRPM | HEIGHT: 71 IN | SYSTOLIC BLOOD PRESSURE: 145 MMHG | TEMPERATURE: 98 F | WEIGHT: 235 LBS | BODY MASS INDEX: 32.9 KG/M2

## 2022-04-21 PROCEDURE — 25000003 PHARM REV CODE 250: Performed by: PHYSICIAN ASSISTANT

## 2022-04-21 RX ORDER — BUTALBITAL, ACETAMINOPHEN AND CAFFEINE 50; 325; 40 MG/1; MG/1; MG/1
1 TABLET ORAL EVERY 4 HOURS PRN
Qty: 10 TABLET | Refills: 0 | Status: SHIPPED | OUTPATIENT
Start: 2022-04-21 | End: 2022-05-21

## 2022-04-21 RX ADMIN — BUTALBITAL, ACETAMINOPHEN, AND CAFFEINE 2 TABLET: 50; 325; 40 TABLET, COATED ORAL at 12:04

## 2022-04-21 NOTE — FIRST PROVIDER EVALUATION
Emergency Department TeleTriage Encounter Note      CHIEF COMPLAINT    Chief Complaint   Patient presents with    Headache     Posterior, since sneezing hard 1 month ago    Hypertension       VITAL SIGNS   Initial Vitals [04/20/22 2303]   BP Pulse Resp Temp SpO2   (!) 193/112 (!) 58 16 98.2 °F (36.8 °C) 98 %      MAP       --            ALLERGIES    Review of patient's allergies indicates:   Allergen Reactions    Ace inhibitors Other (See Comments)     cough       PROVIDER TRIAGE NOTE  47-year-old male to the ER for evaluation of a headache.  Blood pressure is very elevated.  Patient reports compliant with medications.  Denies any nausea or vomiting.  Appears nontoxic.    ORDERS  Labs Reviewed - No data to display    ED Orders (720h ago, onward)    Start Ordered     Status Ordering Provider    04/21/22 0030 04/20/22 2315  butalbital-acetaminophen-caffeine -40 mg per tablet 2 tablet  Once         Ordered GEORGIA DUARTE    04/20/22 2330 04/20/22 2316  hydrALAZINE injection 10 mg  ED 1 Time         Ordered GEORGIA DUARTE    04/20/22 2317 04/20/22 2316  Saline lock IV  Once         Ordered GEORGIA DUARTE            Virtual Visit Note: The provider triage portion of this emergency department evaluation and documentation was performed via [employer], a HIPAA-compliant telemedicine application, in concert with a tele-presenter in the room. A face to face patient evaluation with one of my colleagues will occur once the patient is placed in an emergency department room.      DISCLAIMER: This note was prepared with garbs voice recognition transcription software. Garbled syntax, mangled pronouns, and other bizarre constructions may be attributed to that software system.

## 2022-04-21 NOTE — ED PROVIDER NOTES
Encounter Date: 4/20/2022       History     Chief Complaint   Patient presents with    Headache     Posterior, since sneezing hard 1 month ago    Hypertension     HPI   47-year-old male with history of chronic migraine, hypertension presents for evaluation of headache started approximately 8:00 p.m. tonight.  Headache localized to posterior and right temporal region, describes as throbbing, nonradiating, patient did not take any medications tonight prior to arrival.  Patient states he has been having intermittent migraine for the past month and had tried ibuprofen and hydrocodone prior without much relief.  Patient reports sometimes he has associated photophobia but denies any today.  He denies any recent illness including fever, cough, chest pain or shortness of breath.  He denies any new numbness or weakness.    Review of patient's allergies indicates:   Allergen Reactions    Ace inhibitors Other (See Comments)     cough     Past Medical History:   Diagnosis Date    Allergy     Anxiety     Hypertension     Migraine headache     Migraine syndrome      Past Surgical History:   Procedure Laterality Date    BACK SURGERY      FRACTURE SURGERY      LEFT HEART CATHETERIZATION Left 9/19/2019    Procedure: CATHETERIZATION, HEART, LEFT  (RIGHT RADIAL ACCESS);  Surgeon: Ronnie Bialey MD;  Location: [facility] CATH/EP LAB;  Service: Cardiology;  Laterality: Left;    LUMBAR DISC SURGERY Bilateral     L4- L5 , 2016 April,  Trans    VOCAL FOLD LESION EXCISION  2008     Family History   Problem Relation Age of Onset    Allergic rhinitis Mother     Hypertension Mother     Heart disease Mother     Stroke Mother     Heart disease Father 49    Cancer Maternal Aunt         breast    Heart disease Maternal Aunt     Angioedema Neg Hx     Asthma Neg Hx     Atopy Neg Hx     Eczema Neg Hx     Immunodeficiency Neg Hx     Urticaria Neg Hx      Social History     Tobacco Use    Smoking status: Never Smoker     Smokeless tobacco: Never Used   Substance Use Topics    Alcohol use: No    Drug use: No     Review of Systems   Constitutional: Negative for diaphoresis and fever.   HENT: Negative for congestion, rhinorrhea, sore throat and trouble swallowing.    Eyes: Negative for pain and visual disturbance.   Respiratory: Negative for cough, shortness of breath and wheezing.    Cardiovascular: Negative for chest pain and palpitations.   Gastrointestinal: Negative for abdominal pain, blood in stool, diarrhea, nausea and vomiting.   Genitourinary: Negative for dysuria, flank pain and frequency.   Musculoskeletal: Negative for back pain and gait problem.   Skin: Negative for rash and wound.   Neurological: Positive for headaches. Negative for seizures, syncope, light-headedness and numbness.   Psychiatric/Behavioral: Negative for agitation and behavioral problems.       Physical Exam     Initial Vitals [04/20/22 2303]   BP Pulse Resp Temp SpO2   (!) 193/112 (!) 58 16 98.2 °F (36.8 °C) 98 %      MAP       --         Physical Exam    Nursing note and vitals reviewed.  Constitutional: He appears well-developed. He is not diaphoretic. No distress.   HENT:   Head: Normocephalic and atraumatic.   Right Ear: External ear normal.   Left Ear: External ear normal.   Nose: Nose normal.   Mouth/Throat: Oropharynx is clear and moist. No oropharyngeal exudate.   Eyes: Conjunctivae and EOM are normal. Pupils are equal, round, and reactive to light. Right eye exhibits no discharge. Left eye exhibits no discharge. No scleral icterus.   Neck: Neck supple.   Normal range of motion.  Cardiovascular: Normal rate, regular rhythm, normal heart sounds and intact distal pulses.   No murmur heard.  Pulmonary/Chest: Breath sounds normal. No stridor. No respiratory distress. He has no wheezes. He has no rhonchi. He has no rales.   Abdominal: Abdomen is soft. Bowel sounds are normal. He exhibits no distension. There is no abdominal tenderness. There is no  rebound and no guarding.   Musculoskeletal:         General: Normal range of motion.      Cervical back: Normal range of motion and neck supple.     Neurological: He is alert and oriented to person, place, and time. GCS score is 15. GCS eye subscore is 4. GCS verbal subscore is 5. GCS motor subscore is 6.   A/Ox3, no facial asymmetry, no dysarthria, motor/sensory intact x 4 extremities. Normal finger to nose, Normal gait.   Skin: Skin is warm and dry. Capillary refill takes less than 2 seconds.   Psychiatric: He has a normal mood and affect.         ED Course   Procedures  Labs Reviewed - No data to display       Imaging Results    None          Medications   butalbital-acetaminophen-caffeine -40 mg per tablet 2 tablet (2 tablets Oral Given 4/21/22 0015)   hydrALAZINE injection 10 mg (10 mg Intravenous Given 4/20/22 8624)           APC / Resident Notes:       Shelly Gannon Jr. is a 47 y.o. male p/w headache in the setting of chronic migraine. Vital signs initially hypertensive.  He is bradycardic likely from home beta-blocker.  No hypoxia.    Ddx includes but not limited to migraine headache, tension headache, hypertension.  Patient received Fioricet at triage and on my assessment he denies any further headache.  Received hydralazine with improvement of blood pressure.  Less likely intracranial pathology given no focal neurologic deficits.    Given patient's symptom free at this time.  Condition likely from chronic migraine.  He is stable discharged with Fioricet p.r.n for headache control.  Referral made for neurology Dr. Ruffin for outpatient follow-up.    Tyrell Topete  EM/PEDS PGY5  1:41 AM                 Clinical Impression:   Final diagnoses:  [G43.809] Other migraine without status migrainosus, not intractable (Primary)          ED Disposition Condition    Discharge Stable        ED Prescriptions     Medication Sig Dispense Start Date End Date Auth. Provider    butalbital-acetaminophen-caffeine  -40 mg (FIORICET, ESGIC) -40 mg per tablet Take 1 tablet by mouth every 4 (four) hours as needed for Headaches. 10 tablet 4/21/2022 5/21/2022 Esme Topete MD        Follow-up Information     Follow up With Specialties Details Why Contact Info Additional Information    [facility] - Emergency Dept Emergency Medicine Go to  If symptoms worsen [address]  [facility] [id number] [phone] 1st floor    Zak Ruffin MD Neurology Schedule an appointment as soon as possible for a visit  to check on your chronic migraine [address] [facility] [address]  [phone]              Esme Topete MD  Resident  04/21/22 0254

## 2022-04-21 NOTE — DISCHARGE INSTRUCTIONS
-Please stop taking ibuprofen  -Take fioricet(has tylenol in it) as needed for migraine headache  -Do not exceed 4000mg of tylenol per day  -Call and schedule an appointment with neurology Dr. Ruffin for management of your chronic headache

## 2022-04-22 ENCOUNTER — TELEPHONE (OUTPATIENT)
Dept: FAMILY MEDICINE | Facility: CLINIC | Age: 47
End: 2022-04-22
Payer: MEDICAID

## 2022-04-22 DIAGNOSIS — G44.009 MIGRAINE-CLUSTER HEADACHE SYNDROME: Primary | ICD-10-CM

## 2022-04-22 NOTE — TELEPHONE ENCOUNTER
Patient seen at the ER for migraines on 04/20   Seen by Dr. Alba after ER visit for migraines on 02/05/2021

## 2022-04-22 NOTE — TELEPHONE ENCOUNTER
----- Message from Koki Lozada sent at 4/21/2022  3:26 PM CDT -----  Type:  Patient Requesting Referral    Who Called:  Pt  Does the patient already have the specialty appointment scheduled?:  No  If yes, what is the date of that appointment?:    Referral to What Specialty:  Neurology  Reason for Referral:  Migraines  Does the patient want the referral with a specific physician?:  Yes Dr. Zak Ruffin  Is the specialist an [facility]?:    Patient Requesting a Call Back?:  yes  Best Call Back Number:  [phone]  Additional Information:   Please call and advise

## 2022-04-23 ENCOUNTER — HOSPITAL ENCOUNTER (EMERGENCY)
Facility: HOSPITAL | Age: 47
Discharge: HOME | End: 2022-04-23
Attending: EMERGENCY MEDICINE
Payer: MEDICAID

## 2022-04-23 VITALS
HEART RATE: 60 BPM | OXYGEN SATURATION: 96 % | DIASTOLIC BLOOD PRESSURE: 88 MMHG | RESPIRATION RATE: 16 BRPM | TEMPERATURE: 99 F | BODY MASS INDEX: 33.32 KG/M2 | HEIGHT: 71 IN | WEIGHT: 238 LBS | SYSTOLIC BLOOD PRESSURE: 125 MMHG

## 2022-04-23 DIAGNOSIS — T88.7XXA MEDICATION SIDE EFFECT: ICD-10-CM

## 2022-04-23 DIAGNOSIS — R42 DIZZINESS: Primary | ICD-10-CM

## 2022-04-23 DIAGNOSIS — G43.809 OTHER MIGRAINE WITHOUT STATUS MIGRAINOSUS, NOT INTRACTABLE: ICD-10-CM

## 2022-04-23 LAB
ALBUMIN SERPL BCP-MCNC: 4.1 G/DL (ref 3.5–5.2)
ALP SERPL-CCNC: 38 U/L (ref 55–135)
ALT SERPL W/O P-5'-P-CCNC: 25 U/L (ref 10–44)
ANION GAP SERPL CALC-SCNC: 7 MMOL/L (ref 8–16)
AST SERPL-CCNC: 17 U/L (ref 10–40)
BASOPHILS # BLD AUTO: 0.03 K/UL (ref 0–0.2)
BASOPHILS NFR BLD: 0.7 % (ref 0–1.9)
BILIRUB SERPL-MCNC: 0.7 MG/DL (ref 0.1–1)
BNP SERPL-MCNC: 13 PG/ML (ref 0–99)
BUN SERPL-MCNC: 14 MG/DL (ref 6–20)
CALCIUM SERPL-MCNC: 9.3 MG/DL (ref 8.7–10.5)
CHLORIDE SERPL-SCNC: 105 MMOL/L (ref 95–110)
CO2 SERPL-SCNC: 29 MMOL/L (ref 23–29)
CREAT SERPL-MCNC: 1.2 MG/DL (ref 0.5–1.4)
DIFFERENTIAL METHOD: NORMAL
EOSINOPHIL # BLD AUTO: 0.1 K/UL (ref 0–0.5)
EOSINOPHIL NFR BLD: 3.4 % (ref 0–8)
ERYTHROCYTE [DISTWIDTH] IN BLOOD BY AUTOMATED COUNT: 13.4 % (ref 11.5–14.5)
EST. GFR  (AFRICAN AMERICAN): >60 ML/MIN/1.73 M^2
EST. GFR  (NON AFRICAN AMERICAN): >60 ML/MIN/1.73 M^2
GLUCOSE SERPL-MCNC: 93 MG/DL (ref 70–110)
HCT VFR BLD AUTO: 44.3 % (ref 40–54)
HGB BLD-MCNC: 15.2 G/DL (ref 14–18)
IMM GRANULOCYTES # BLD AUTO: 0.01 K/UL (ref 0–0.04)
IMM GRANULOCYTES NFR BLD AUTO: 0.2 % (ref 0–0.5)
LYMPHOCYTES # BLD AUTO: 1.5 K/UL (ref 1–4.8)
LYMPHOCYTES NFR BLD: 36.2 % (ref 18–48)
MCH RBC QN AUTO: 29.8 PG (ref 27–31)
MCHC RBC AUTO-ENTMCNC: 34.3 G/DL (ref 32–36)
MCV RBC AUTO: 87 FL (ref 82–98)
MONOCYTES # BLD AUTO: 0.4 K/UL (ref 0.3–1)
MONOCYTES NFR BLD: 8.7 % (ref 4–15)
NEUTROPHILS # BLD AUTO: 2.1 K/UL (ref 1.8–7.7)
NEUTROPHILS NFR BLD: 50.8 % (ref 38–73)
NRBC BLD-RTO: 0 /100 WBC
PLATELET # BLD AUTO: 193 K/UL (ref 150–450)
PMV BLD AUTO: 11.4 FL (ref 9.2–12.9)
POTASSIUM SERPL-SCNC: 4.2 MMOL/L (ref 3.5–5.1)
PROT SERPL-MCNC: 7.1 G/DL (ref 6–8.4)
RBC # BLD AUTO: 5.1 M/UL (ref 4.6–6.2)
SODIUM SERPL-SCNC: 141 MMOL/L (ref 136–145)
TROPONIN I SERPL DL<=0.01 NG/ML-MCNC: <0.03 NG/ML
WBC # BLD AUTO: 4.14 K/UL (ref 3.9–12.7)

## 2022-04-23 PROCEDURE — 99284 EMERGENCY DEPT VISIT MOD MDM: CPT | Mod: 25

## 2022-04-23 PROCEDURE — 93005 ELECTROCARDIOGRAM TRACING: CPT | Performed by: GENERAL PRACTICE

## 2022-04-23 PROCEDURE — 63600175 PHARM REV CODE 636 W HCPCS: Performed by: PHYSICIAN ASSISTANT

## 2022-04-23 PROCEDURE — 93010 ELECTROCARDIOGRAM REPORT: CPT | Mod: ,,, | Performed by: GENERAL PRACTICE

## 2022-04-23 PROCEDURE — 85025 COMPLETE CBC W/AUTO DIFF WBC: CPT | Performed by: PHYSICIAN ASSISTANT

## 2022-04-23 PROCEDURE — 93010 EKG 12-LEAD: ICD-10-PCS | Mod: ,,, | Performed by: GENERAL PRACTICE

## 2022-04-23 PROCEDURE — 96374 THER/PROPH/DIAG INJ IV PUSH: CPT

## 2022-04-23 PROCEDURE — 84484 ASSAY OF TROPONIN QUANT: CPT | Performed by: PHYSICIAN ASSISTANT

## 2022-04-23 PROCEDURE — 80053 COMPREHEN METABOLIC PANEL: CPT | Performed by: PHYSICIAN ASSISTANT

## 2022-04-23 PROCEDURE — 83880 ASSAY OF NATRIURETIC PEPTIDE: CPT | Performed by: PHYSICIAN ASSISTANT

## 2022-04-23 RX ORDER — PROCHLORPERAZINE EDISYLATE 5 MG/ML
10 INJECTION INTRAMUSCULAR; INTRAVENOUS
Status: COMPLETED | OUTPATIENT
Start: 2022-04-23 | End: 2022-04-23

## 2022-04-23 RX ORDER — PROCHLORPERAZINE MALEATE 10 MG
10 TABLET ORAL EVERY 6 HOURS PRN
Qty: 20 TABLET | Refills: 0 | Status: SHIPPED | OUTPATIENT
Start: 2022-04-23 | End: 2023-11-28

## 2022-04-23 RX ORDER — AMOXICILLIN 500 MG
1 CAPSULE ORAL DAILY
COMMUNITY
End: 2023-03-17 | Stop reason: ALTCHOICE

## 2022-04-23 RX ADMIN — PROCHLORPERAZINE EDISYLATE 10 MG: 5 INJECTION INTRAMUSCULAR; INTRAVENOUS at 07:04

## 2022-04-23 NOTE — ED NOTES
To er with c/o dizziness. Pt states onset after he started taking Fioricet for headaches. Maew. Speech clear. No drift, droop, weakness. Aaox4. He denies any other complaint. States + compliant with home meds, bp med.

## 2022-04-23 NOTE — ED PROVIDER NOTES
"Encounter Date: 4/23/2022       History     Chief Complaint   Patient presents with    Dizziness     Patient is a 47yoM who presents for intermittently dizziness; pertinent PMHx migraines, HTN, anxiety. Patient was recently given Fioricet for headaches. He reports after taking this medication at home yesterday, he develops a "wooziness", occurs frequently when he was taking this medicine Q4h. Denies presyncope nor vertiginous symptoms. Wooziness is not associated with CP, palpitations, SOB, abd pain, vision change.  He also reports continued global headache, consistent with prior migraines. Fioricet with transient relief. No head trauma. Denies numbness, weakness, neck pain, difficulty ambulating.   The patients available PMH, PSH, Social History, medications, allergies, and triage vital signs were reviewed just prior to their medical evaluation.  A ten point review of systems was completed and is negative except as documented above.  Patient denies any other acute medical complaint.    Please be advised this text was dictated with [employer] software and may contain errors due to translation.           Review of patient's allergies indicates:   Allergen Reactions    Ace inhibitors Other (See Comments)     cough     Past Medical History:   Diagnosis Date    Allergy     Anxiety     Hypertension     Migraine headache     Migraine syndrome      Past Surgical History:   Procedure Laterality Date    BACK SURGERY      FRACTURE SURGERY      LEFT HEART CATHETERIZATION Left 9/19/2019    Procedure: CATHETERIZATION, HEART, LEFT  (RIGHT RADIAL ACCESS);  Surgeon: Ronnie Bailey MD;  Location: [facility] CATH/EP LAB;  Service: Cardiology;  Laterality: Left;    LUMBAR DISC SURGERY Bilateral     L4- L5 , 2016 April,  Trans    VOCAL FOLD LESION EXCISION  2008     Family History   Problem Relation Age of Onset    Allergic rhinitis Mother     Hypertension Mother     Heart disease Mother     Stroke Mother     Heart disease " Father 49    Cancer Maternal Aunt         breast    Heart disease Maternal Aunt     Angioedema Neg Hx     Asthma Neg Hx     Atopy Neg Hx     Eczema Neg Hx     Immunodeficiency Neg Hx     Urticaria Neg Hx      Social History     Tobacco Use    Smoking status: Never Smoker    Smokeless tobacco: Never Used   Substance Use Topics    Alcohol use: No    Drug use: No     Review of Systems   Constitutional: Negative for chills and fever.   HENT: Negative for sore throat.    Respiratory: Negative for shortness of breath.    Cardiovascular: Negative for chest pain.   Gastrointestinal: Negative for nausea.   Genitourinary: Negative for dysuria and flank pain.   Musculoskeletal: Negative for back pain.   Skin: Negative for rash.   Neurological: Positive for headaches. Negative for dizziness (wooziness), seizures, syncope, speech difficulty, weakness and numbness.   Hematological: Does not bruise/bleed easily.       Physical Exam     Initial Vitals [04/23/22 1721]   BP Pulse Resp Temp SpO2   (!) 168/100 75 18 98.5 °F (36.9 °C) 96 %      MAP       --         Physical Exam    Nursing note and vitals reviewed.  Constitutional: He appears well-developed and well-nourished. He is not diaphoretic. No distress.   HENT:   Head: Normocephalic and atraumatic.   Right Ear: External ear normal.   Left Ear: External ear normal.   Mouth/Throat: Oropharynx is clear and moist.   Eyes: Conjunctivae and EOM are normal. Pupils are equal, round, and reactive to light. No scleral icterus.   No nystagmus    Cardiovascular: Normal rate, regular rhythm and intact distal pulses.   No murmur (no murmur with valsalva) heard.  Pulmonary/Chest: Breath sounds normal. No respiratory distress. He has no wheezes. He has no rhonchi. He has no rales.   Abdominal: Abdomen is soft. Bowel sounds are normal. There is no abdominal tenderness. There is no rebound and no guarding.   Musculoskeletal:         General: No edema. Normal range of motion.  "    Neurological: He is alert and oriented to person, place, and time. He has normal strength. No cranial nerve deficit or sensory deficit.   No focal nor globa neuro findings, cerebellar testing nml   Skin: Skin is warm and dry. Capillary refill takes less than 2 seconds.   Psychiatric: He has a normal mood and affect. His behavior is normal. Judgment and thought content normal.         ED Course   Procedures  Labs Reviewed - No data to display       Imaging Results    None          Medications - No data to display  Medical Decision Making:   History:   Old Medical Records: I decided to obtain old medical records.  Old Records Summarized: records from clinic visits and records from previous admission(s).       <> Summary of Records: Echo 1/2022    · The left ventricle is normal in size with concentric hypertrophy and normal systolic function.  · The estimated ejection fraction is 65%.  · Normal left ventricular diastolic function.  · Atrial fibrillation not observed.  · Normal right ventricular size with normal right ventricular systolic function.  · Normal central venous pressure (3 mmHg).    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    Initial Assessment:   Patient presents for intermittent "wooziness" after starting fioricet for migraines. Neuro exam unremarkable, +current h/a. VSS, afebrile  Differential Diagnosis:   DDx includes medication s/e, dehydration, less likely vertigo, orthostasis. Physical exam and history taking lower clinical suspicion for CVA, ACS, dysrhythmia.   Independently Interpreted Test(s):   I have ordered and independently interpreted X-rays - see prior notes.  I have ordered and independently interpreted EKG Reading(s) - see prior notes  Clinical Tests:   Lab Tests: Ordered and Reviewed  Radiological Study: Ordered and Reviewed  Medical Tests: Ordered and Reviewed             ED " Course as of 04/24/22 1230   Sat Apr 23, 2022   1851 BP(!): 168/100  138/82 on repeat [MF]   1950 CT Head Without Contrast  No acute findings  [MF]   2048 Troponin I: <0.030 [MF]   2048 BNP: 13 [MF]   2048 Comprehensive metabolic panel(!)  Largely unremarkable [MF]      ED Course User Index  [MF] Selina Marroquin PA-C           Given exam and w/u, I do not suspect emergent etiology of vague dizziness symptoms at this time. No active dizziness on exam. Stable for outpatient care, d/c [employer] compazine. Patient is seeing a headache specialist in future. Patient agreed to plan of care and voiced understanding. Discharged in stable condition with strict ED return precautions.    Selina Marroquin PA-C  04/24/2022    I discussed the following case, diagnosis and plan of care with attending physician.    Clinical Impression:   Final diagnoses:  [R42] Dizziness                 Selina Marroquin PA-C  04/24/22 9966

## 2022-04-23 NOTE — DISCHARGE INSTRUCTIONS
-stop taking Fioricet, try new medication, Add 1000mg tylenol every 8 hours if needed for increased pain control.  -follow up with neurologist. Add over the counter magnesium  for headaches  -return to ER for rapidly worsening headache or new dizziness

## 2022-06-20 ENCOUNTER — TELEPHONE (OUTPATIENT)
Dept: FAMILY MEDICINE | Facility: CLINIC | Age: 47
End: 2022-06-20
Payer: MEDICAID

## 2022-06-20 NOTE — TELEPHONE ENCOUNTER
Returned patients call in regards to needing a prescription for laryngitis. No answer , left voicemail to return call. Patient will need to be seen in clinic for an appointment.

## 2022-06-20 NOTE — TELEPHONE ENCOUNTER
----- Message from Sienna Smith sent at 6/20/2022 11:29 AM CDT -----  Contact: [phone]  Type: Needs Medical Advice  Who Called:  Pt    Best Call Back Number: [phone]    Additional Information: Pt is calling in for a rx for laryngitis. Pls call back and advise.        Pls call pt and she where he is at at time of rx being sent. Pt is traveling to [place] for work. He trin[place] be leaving this after noon.

## 2022-06-20 NOTE — TELEPHONE ENCOUNTER
Please  the patient on general home care guidelines for cough,larygitis and congestion including increasing fluid intake, getting plenty of rest and use of OTC cough and cold medications.  I recommended guafenesin for congestion and dextromethorphan as directed for cough.  A brand like Mucinex DM is recommended.  Avoidance of decongestants is recommended for patients with heart problems and hypertension.  Extra vitamin C may also benefit.Salt water gargles 3-4 times a day.Use OTC Capastat or Cepacol.  Return to clinic if symptoms last longer than 10 days or sooner if worsen with symptoms like fever > 100.4, severe sinus pain or headache, thick yellow nasal discharge or sputum, dehydration or lethargy.

## 2022-06-20 NOTE — TELEPHONE ENCOUNTER
----- Message from Chantel Villafuerte sent at 6/20/2022  8:14 AM CDT -----  Type:  Patient Call Back    Who Called: Pt     What is the reqeust in detail:Pt is requesting a call back regarding laryngitis, he states that he was test have a sore throat and tested negative for covid and strep throat. Please Adivse    Can the clinic reply by [facility]?    Best Call Back Number:[phone]

## 2022-06-20 NOTE — TELEPHONE ENCOUNTER
Spoke to pt states he started with symptoms 3 weeks ago. Pt went to urgent care and ER as he is currently out of town. Pt states they advised pt he has laryngitis. Pt was not prescribed anything and advised pt to contact his PCP. Pt was tested for COVID and Strep throat which were both negative. Pt complains of loss of voice and raspy voice. Pt denies any and all other symptoms. Pt denies fever, sore throat, pain, congestion, sinuses etc. Pt states he is just loosing his voice and would like to know how to treat it. Offered pt appt and he advised he is on his way back to [place], [place] for his job. Advised pt of voice rest, warm teas and honey to help. Offered to schedule appt for when pt return and he declined stating he was not sure when he would be back. Please advise

## 2022-06-20 NOTE — TELEPHONE ENCOUNTER
----- Message from Marci Desai sent at 6/20/2022  1:49 PM CDT -----  Regarding: Call back  Contact: [phone]  Type:  Patient Call Back    Who Called:pt  What this is regarding?:new rx   Would the patient rather a call back or a response via [facility]? Call back  Best Call Back Number:[phone]  Additional Information:   [address] [employer] [address] [facility] [address]  Phone: [phone] Fax: [phone]      Advised to call back directly if there are further questions, or if these symptoms fail to improve as anticipated or worsen.

## 2022-07-05 ENCOUNTER — TELEPHONE (OUTPATIENT)
Dept: FAMILY MEDICINE | Facility: CLINIC | Age: 47
End: 2022-07-05
Payer: MEDICAID

## 2022-07-05 ENCOUNTER — HOSPITAL ENCOUNTER (EMERGENCY)
Facility: HOSPITAL | Age: 47
Discharge: HOME | End: 2022-07-05
Attending: EMERGENCY MEDICINE
Payer: MEDICAID

## 2022-07-05 VITALS
HEART RATE: 66 BPM | WEIGHT: 238 LBS | DIASTOLIC BLOOD PRESSURE: 100 MMHG | SYSTOLIC BLOOD PRESSURE: 160 MMHG | TEMPERATURE: 98 F | OXYGEN SATURATION: 99 % | BODY MASS INDEX: 32.23 KG/M2 | RESPIRATION RATE: 17 BRPM | HEIGHT: 72 IN

## 2022-07-05 DIAGNOSIS — J02.9 SORE THROAT: Primary | ICD-10-CM

## 2022-07-05 LAB — GROUP A STREP, MOLECULAR: NEGATIVE

## 2022-07-05 PROCEDURE — 99282 EMERGENCY DEPT VISIT SF MDM: CPT

## 2022-07-05 PROCEDURE — 87651 STREP A DNA AMP PROBE: CPT | Performed by: NURSE PRACTITIONER

## 2022-07-05 NOTE — TELEPHONE ENCOUNTER
----- Message from Sienna Smith sent at 7/5/2022  8:04 AM CDT -----  Contact: [phone]  Type:  Same Day Appointment Request    Caller is requesting a same day appointment.  Caller declined first available appointment listed below.      Name of Caller: Pt  When is the first available appointment?  NA  Symptoms:  Raspy voice for over a month   Best Call Back Number: [phone]  Additional Information:   Pls call back and advise

## 2022-07-05 NOTE — ED PROVIDER NOTES
Encounter Date: 7/5/2022       History     Chief Complaint   Patient presents with    Sore Throat     Sore throat without fever or difficulty swallowing  He also reports hoarseness Onset 1 month          Review of patient's allergies indicates:   Allergen Reactions    Ace inhibitors Other (See Comments)     cough     Past Medical History:   Diagnosis Date    Allergy     Anxiety     Hypertension     Migraine headache     Migraine syndrome      Past Surgical History:   Procedure Laterality Date    BACK SURGERY      FRACTURE SURGERY      LEFT HEART CATHETERIZATION Left 9/19/2019    Procedure: CATHETERIZATION, HEART, LEFT  (RIGHT RADIAL ACCESS);  Surgeon: Ronnie Bailey MD;  Location: [facility] CATH[facility]EP LAB;  Service: Cardiology;  Laterality: Left;    LUMBAR DISC SURGERY Bilateral     L4- L5 , 2016 April,  Trans    VOCAL FOLD LESION EXCISION  2008     Family History   Problem Relation Age of Onset    Allergic rhinitis Mother     Hypertension Mother     Heart disease Mother     Stroke Mother     Heart disease Father 49    Cancer Maternal Aunt         breast    Heart disease Maternal Aunt     Angioedema Neg Hx     Asthma Neg Hx     Atopy Neg Hx     Eczema Neg Hx     Immunodeficiency Neg Hx     Urticaria Neg Hx      Social History     Tobacco Use    Smoking status: Never Smoker    Smokeless tobacco: Never Used   Substance Use Topics    Alcohol use: No    Drug use: No     Review of Systems   Constitutional: Negative for fever.   HENT: Positive for sore throat. Negative for congestion, drooling, ear pain, mouth sores, postnasal drip, rhinorrhea, sinus pressure, sinus pain and trouble swallowing.    Respiratory: Negative for cough, shortness of breath and wheezing.    Cardiovascular: Negative for chest pain, palpitations and leg swelling.   Gastrointestinal: Negative for abdominal pain, diarrhea, nausea and vomiting.   Musculoskeletal: Negative for back pain.   Skin: Negative for rash.    Neurological: Negative for weakness.       Physical Exam     Initial Vitals [07/05/22 0944]   BP Pulse Resp Temp SpO2   (!) 167/114 66 17 98.4 °F (36.9 °C) 99 %      MAP       --         Physical Exam    Constitutional: He appears well-developed and well-nourished.   HENT:   Head: Normocephalic.   Mouth/Throat: Oropharynx is clear and moist. No oropharyngeal exudate.   Bilateral TM's jennifer  Pharynx neg for erythema or swelling Airway patent Nares with erythema and mild swelling Neg for lymph node swelling    Eyes: Conjunctivae are normal.   Neck: Neck supple.   Normal range of motion.  Cardiovascular: Normal rate, regular rhythm and normal heart sounds.   Pulmonary/Chest: Breath sounds normal. No respiratory distress. He has no wheezes. He has no rhonchi.   Abdominal: Abdomen is soft. Bowel sounds are normal. He exhibits no distension.   Musculoskeletal:         General: Normal range of motion.      Cervical back: Normal range of motion and neck supple.     Neurological: He is alert and oriented to person, place, and time. No sensory deficit. GCS score is 15. GCS eye subscore is 4. GCS verbal subscore is 5. GCS motor subscore is 6.   Skin: Skin is warm. Capillary refill takes less than 2 seconds.   Psychiatric: He has a normal mood and affect. Thought content normal.         ED Course   Procedures  Labs Reviewed   GROUP A STREP, MOLECULAR          Imaging Results    None          Medications - No data to display  Medical Decision Making:   Initial Assessment:   Sore throat with hoarseness Onset 1 month  Denies fever or difficulty swallowing Denies cough   Clinical Tests:   Lab Tests: Reviewed       <> Summary of Lab: Strep neg   ED Management:  Strep neg Pt has been instructed to gargle with Cepacol Mouth wash  He was given a referral to ENT  At no time while in the ED did this pt appear in any distress   Have discussed this pt with Dr. Lobo                       Clinical Impression:   Final diagnoses:  [J02.9]  Sore throat (Primary)          ED Disposition Condition    Discharge Stable        ED Prescriptions     None        Follow-up Information     Follow up With Specialties Details Why Contact Info    Emile Pope MD Otolaryngology In 2 days  [address]  [facility]SE [facility]  [facility] [address]  [phone]             Tamie Jensen NP  07/05/22 1270

## 2022-07-06 ENCOUNTER — TELEPHONE (OUTPATIENT)
Dept: FAMILY MEDICINE | Facility: CLINIC | Age: 47
End: 2022-07-06
Payer: MEDICAID

## 2022-07-06 NOTE — TELEPHONE ENCOUNTER
----- Message from Chantel Villafuerte sent at 7/6/2022 12:14 PM CDT -----  Type:  Patient Call Back    Who Called: Pt     What is the reqeust in detail: Pt is requesting a call back in regards to a referral for ENT, pt states that he was seen by the ER yesterday and states that they recommended he see ENT as soon as possible. Please Advise     Can the clinic reply by [facility]?    Best Call Back Number:[phone]

## 2022-07-12 ENCOUNTER — OFFICE VISIT (OUTPATIENT)
Dept: FAMILY MEDICINE | Facility: CLINIC | Age: 47
End: 2022-07-12
Payer: MEDICAID

## 2022-07-12 VITALS
DIASTOLIC BLOOD PRESSURE: 88 MMHG | RESPIRATION RATE: 18 BRPM | HEIGHT: 72 IN | BODY MASS INDEX: 33.2 KG/M2 | SYSTOLIC BLOOD PRESSURE: 139 MMHG | TEMPERATURE: 99 F | OXYGEN SATURATION: 99 % | HEART RATE: 61 BPM | WEIGHT: 245.13 LBS

## 2022-07-12 DIAGNOSIS — R49.0 HOARSENESS OF VOICE: Primary | ICD-10-CM

## 2022-07-12 DIAGNOSIS — I10 ESSENTIAL HYPERTENSION: ICD-10-CM

## 2022-07-12 DIAGNOSIS — E78.2 MIXED HYPERLIPIDEMIA: ICD-10-CM

## 2022-07-12 PROCEDURE — 3008F BODY MASS INDEX DOCD: CPT | Mod: CPTII,,,

## 2022-07-12 PROCEDURE — 1159F PR MEDICATION LIST DOCUMENTED IN MEDICAL RECORD: ICD-10-PCS | Mod: CPTII,,,

## 2022-07-12 PROCEDURE — 1160F PR REVIEW ALL MEDS BY PRESCRIBER/CLIN PHARMACIST DOCUMENTED: ICD-10-PCS | Mod: CPTII,,,

## 2022-07-12 PROCEDURE — 1159F MED LIST DOCD IN RCRD: CPT | Mod: CPTII,,,

## 2022-07-12 PROCEDURE — 99214 PR OFFICE/OUTPT VISIT, EST, LEVL IV, 30-39 MIN: ICD-10-PCS | Mod: S$PBB,,,

## 2022-07-12 PROCEDURE — 3075F PR MOST RECENT SYSTOLIC BLOOD PRESS GE 130-139MM HG: ICD-10-PCS | Mod: CPTII,,,

## 2022-07-12 PROCEDURE — 3079F PR MOST RECENT DIASTOLIC BLOOD PRESSURE 80-89 MM HG: ICD-10-PCS | Mod: CPTII,,,

## 2022-07-12 PROCEDURE — 99214 OFFICE O/P EST MOD 30 MIN: CPT | Mod: S$PBB,,,

## 2022-07-12 PROCEDURE — 4010F PR ACE/ARB THEARPY RXD/TAKEN: ICD-10-PCS | Mod: CPTII,,,

## 2022-07-12 PROCEDURE — 4010F ACE/ARB THERAPY RXD/TAKEN: CPT | Mod: CPTII,,,

## 2022-07-12 PROCEDURE — 99999 PR PBB SHADOW E&M-EST. PATIENT-LVL IV: CPT | Mod: PBBFAC,,,

## 2022-07-12 PROCEDURE — 99999 PR PBB SHADOW E&M-EST. PATIENT-LVL IV: ICD-10-PCS | Mod: PBBFAC,,,

## 2022-07-12 PROCEDURE — 99214 OFFICE O/P EST MOD 30 MIN: CPT | Mod: PBBFAC,PO

## 2022-07-12 PROCEDURE — 3079F DIAST BP 80-89 MM HG: CPT | Mod: CPTII,,,

## 2022-07-12 PROCEDURE — 1160F RVW MEDS BY RX/DR IN RCRD: CPT | Mod: CPTII,,,

## 2022-07-12 PROCEDURE — 3008F PR BODY MASS INDEX (BMI) DOCUMENTED: ICD-10-PCS | Mod: CPTII,,,

## 2022-07-12 PROCEDURE — 3075F SYST BP GE 130 - 139MM HG: CPT | Mod: CPTII,,,

## 2022-07-12 NOTE — PROGRESS NOTES
Subjective:       Patient ID: Shelly Gannon Jr. is a 47 y.o. male.    Chief Complaint: Hoarse    Shelly Gannon Jr is a 46 yo M pt w/ MHx listed below that presents to the clinic w/ complaints of acute onset hoarse voice after experiencing a bad cough for a few days. He denies being acutely sick prior to his symptoms or having sick contacts. He denies reflux symptoms, weak extremities, facial droop, smoking or drinking in the past. He admits to having a feeling of tightness in his throat when he swallows sometimes. He has to clear his throat to speak most of the time. 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 He denies trouble breathing or taking in food or drink.       Past Medical History:   Diagnosis Date    Allergy     Anxiety     Hypertension     Migraine headache     Migraine syndrome        Review of patient's allergies indicates:   Allergen Reactions    Ace inhibitors Other (See Comments)     cough         Current Outpatient Medications:     losartan (COZAAR) 25 MG tablet, Take 1 tablet (25 mg total) by mouth once daily., Disp: 90 tablet, Rfl: 3    pantoprazole (PROTONIX) 40 MG tablet, Take 1 tablet (40 mg total) by mouth once daily., Disp: 30 tablet, Rfl: 11    aspirin (ECOTRIN) 81 MG EC tablet, Take 81 mg by mouth once daily., Disp: , Rfl:     atorvastatin (LIPITOR) 40 MG tablet, Take 1 tablet (40 mg total) by mouth every evening. (Patient not taking: Reported on 7/12/2022), Disp: 90 tablet, Rfl: 3    hydroCHLOROthiazide (HYDRODIURIL) 25 MG tablet, Take 25 mg by mouth once daily., Disp: , Rfl:     metoprolol succinate (TOPROL-XL) 25 MG 24 hr tablet, Take 25 mg by mouth once daily., Disp: , Rfl:     omega-3 fatty acids/fish oil (FISH OIL-OMEGA-3 FATTY ACIDS) 300-1,000 mg capsule, Take 1 capsule by mouth once daily., Disp: , Rfl:     prochlorperazine (COMPAZINE) 10 MG tablet, Take 1 tablet (10 mg total) by mouth every 6 (six) hours as needed. (Patient not taking:  "Reported on 7/12/2022), Disp: 20 tablet, Rfl: 0  No current facility-administered medications for this visit.    Facility-Administered Medications Ordered in Other Visits:     0.9%  NaCl infusion, , Intravenous, Continuous, Ronnie Bailey MD, Last Rate: 75 mL/hr at 09/19/19 0930, New Bag at 09/19/19 0930    Review of Systems   Constitutional: Negative for activity change, appetite change, chills, diaphoresis, fatigue, fever and unexpected weight change.   HENT: Positive for trouble swallowing and voice change. Negative for congestion, ear pain, postnasal drip, rhinorrhea, sinus pressure, sneezing and sore throat.    Eyes: Negative for pain, itching and visual disturbance.   Respiratory: Positive for cough. Negative for chest tightness, shortness of breath and wheezing.    Cardiovascular: Negative for chest pain, palpitations and leg swelling.   Gastrointestinal: Negative for abdominal distention, abdominal pain, blood in stool, constipation, diarrhea, nausea and vomiting.   Endocrine: Negative for cold intolerance and heat intolerance.   Genitourinary: Negative for difficulty urinating, dysuria, frequency, hematuria and urgency.   Musculoskeletal: Negative for arthralgias, back pain, myalgias and neck pain.   Skin: Negative for color change, pallor, rash and wound.   Neurological: Negative for dizziness, syncope, weakness, numbness and headaches.   Hematological: Negative for adenopathy.   Psychiatric/Behavioral: Negative for behavioral problems. The patient is not nervous/anxious.        Objective:      /88 (BP Location: Right arm, Patient Position: Sitting, BP Method: Large (Manual))   Pulse 61   Temp 99 °F (37.2 °C) (Oral)   Resp 18   Ht 5' 11.5" (1.816 m)   Wt 111.2 kg (245 lb 2.4 oz)   SpO2 99%   BMI 33.72 kg/m²   Physical Exam  Vitals reviewed.   Constitutional:       General: He is not in acute distress.     Appearance: Normal appearance. He is obese. He is not ill-appearing, toxic-appearing " or diaphoretic.   HENT:      Head: Normocephalic.      Right Ear: External ear normal.      Left Ear: External ear normal.      Nose: Nose normal. No congestion or rhinorrhea.      Mouth/Throat:      Mouth: Mucous membranes are moist.      Pharynx: Oropharynx is clear. No posterior oropharyngeal erythema.   Eyes:      General: No scleral icterus.        Right eye: No discharge.         Left eye: No discharge.      Extraocular Movements: Extraocular movements intact.      Conjunctiva/sclera: Conjunctivae normal.   Cardiovascular:      Rate and Rhythm: Normal rate and regular rhythm.      Pulses: Normal pulses.      Heart sounds: Normal heart sounds. No murmur heard.    No friction rub. No gallop.   Pulmonary:      Effort: Pulmonary effort is normal. No respiratory distress.      Breath sounds: Normal breath sounds. No wheezing, rhonchi or rales.   Chest:      Chest wall: No tenderness.   Abdominal:      General: There is no distension.      Palpations: Abdomen is soft. There is no mass.      Tenderness: There is no abdominal tenderness. There is no guarding.   Musculoskeletal:         General: No swelling, tenderness or deformity. Normal range of motion.      Cervical back: Normal range of motion.      Right lower leg: No edema.      Left lower leg: No edema.   Skin:     General: Skin is warm and dry.      Capillary Refill: Capillary refill takes less than 2 seconds.      Coloration: Skin is not jaundiced.      Findings: No bruising, erythema, lesion or rash.   Neurological:      Mental Status: He is alert and oriented to person, place, and time.      Gait: Gait normal.   Psychiatric:         Mood and Affect: Mood normal.         Behavior: Behavior normal.         Thought Content: Thought content normal.         Judgment: Judgment normal.         Assessment:       1. Hoarseness of voice    2. Essential hypertension    3. Mixed hyperlipidemia        Plan:       Hoarseness of voice  -     Ambulatory referral/consult to  ENT; Future; Expected date: 07/19/2022    Essential hypertension        -    Stable. Continue meds as prescribed. Will continue to monitor.    Mixed hyperlipidemia         -   Continue meds as prescribed. Will continue to monitor.        Pt to follow up with PCP routinely.        Vick Monk PA-C  Family Medicine Physician Assistant       I spent a total of 20 minutes on the day of the visit.This includes face to face time and non-face to face time preparing to see the patient (eg, review of tests), obtaining and/or reviewing separately obtained history, documenting clinical information in the electronic or other health record, independently interpreting results and communicating results to the patient/family/caregiver, or care coordinator.      We have addressed [4] Moderate: 1 or more chronic illnesses with exacerbation, progression, or side effects of treatment / 2 or more stable chronic illnesses / 1 undiagnosed new problem with uncertain prognosis / 1 acute illness with systemic symptoms / 1 acute complicated injury  The complexity of the data reviewed and analyzed for this visit was [2] Minimal or None  The risk of complications and/or morbidity or mortality are [4] Moderate risk (I.e. prescription drug management / decision regarding minor surgery with identified pt or procedure risk factors / decision regarding elective major surgery without identified pt or procedure risk factors / diagnosis or treatment significantly limited by social determinants of health)   The level of Medical Decision Making for this visit is [4] Moderate

## 2022-07-29 ENCOUNTER — TELEPHONE (OUTPATIENT)
Dept: OTOLARYNGOLOGY | Facility: CLINIC | Age: 47
End: 2022-07-29
Payer: MEDICAID

## 2022-07-29 NOTE — TELEPHONE ENCOUNTER
----- Message from Bee Sheridan MA sent at 7/29/2022  2:10 PM CDT -----  Regarding: FW: appt soon  Contact: PT @ [phone]    ----- Message -----  From: Radha Bae  Sent: 7/29/2022   1:23 PM CDT  To: [facility] Clinical Staff  Subject: appt soon                                        Pt is returning a missed call from Mari with ENT in regards to an appt soon. Please call pt. Thanks.

## 2022-08-09 ENCOUNTER — OFFICE VISIT (OUTPATIENT)
Dept: OTOLARYNGOLOGY | Facility: CLINIC | Age: 47
End: 2022-08-09
Payer: MEDICAID

## 2022-08-09 VITALS — HEART RATE: 70 BPM | DIASTOLIC BLOOD PRESSURE: 93 MMHG | SYSTOLIC BLOOD PRESSURE: 143 MMHG

## 2022-08-09 DIAGNOSIS — D14.1 LARYNX NEOPLASM BENIGN: Primary | ICD-10-CM

## 2022-08-09 DIAGNOSIS — R49.0 DYSPHONIA: ICD-10-CM

## 2022-08-09 PROCEDURE — 31579 LARYNGOSCOPY TELESCOPIC: CPT | Mod: PBBFAC | Performed by: OTOLARYNGOLOGY

## 2022-08-09 PROCEDURE — 3080F DIAST BP >= 90 MM HG: CPT | Mod: CPTII,,, | Performed by: OTOLARYNGOLOGY

## 2022-08-09 PROCEDURE — 99204 OFFICE O/P NEW MOD 45 MIN: CPT | Mod: 25,S$PBB,, | Performed by: OTOLARYNGOLOGY

## 2022-08-09 PROCEDURE — 99999 PR PBB SHADOW E&M-EST. PATIENT-LVL IV: CPT | Mod: PBBFAC,,, | Performed by: OTOLARYNGOLOGY

## 2022-08-09 PROCEDURE — 1160F PR REVIEW ALL MEDS BY PRESCRIBER/CLIN PHARMACIST DOCUMENTED: ICD-10-PCS | Mod: CPTII,,, | Performed by: OTOLARYNGOLOGY

## 2022-08-09 PROCEDURE — 4010F PR ACE/ARB THEARPY RXD/TAKEN: ICD-10-PCS | Mod: CPTII,,, | Performed by: OTOLARYNGOLOGY

## 2022-08-09 PROCEDURE — 31579 PR LARYNGOSCOPY, FLEX/RIGID TELESCOPIC, W/STROBOSCOPY: ICD-10-PCS | Mod: S$PBB,,, | Performed by: OTOLARYNGOLOGY

## 2022-08-09 PROCEDURE — 31579 LARYNGOSCOPY TELESCOPIC: CPT | Mod: S$PBB,,, | Performed by: OTOLARYNGOLOGY

## 2022-08-09 PROCEDURE — 99204 PR OFFICE/OUTPT VISIT, NEW, LEVL IV, 45-59 MIN: ICD-10-PCS | Mod: 25,S$PBB,, | Performed by: OTOLARYNGOLOGY

## 2022-08-09 PROCEDURE — 99999 PR PBB SHADOW E&M-EST. PATIENT-LVL IV: ICD-10-PCS | Mod: PBBFAC,,, | Performed by: OTOLARYNGOLOGY

## 2022-08-09 PROCEDURE — 4010F ACE/ARB THERAPY RXD/TAKEN: CPT | Mod: CPTII,,, | Performed by: OTOLARYNGOLOGY

## 2022-08-09 PROCEDURE — 1160F RVW MEDS BY RX/DR IN RCRD: CPT | Mod: CPTII,,, | Performed by: OTOLARYNGOLOGY

## 2022-08-09 PROCEDURE — 3077F PR MOST RECENT SYSTOLIC BLOOD PRESSURE >= 140 MM HG: ICD-10-PCS | Mod: CPTII,,, | Performed by: OTOLARYNGOLOGY

## 2022-08-09 PROCEDURE — 99214 OFFICE O/P EST MOD 30 MIN: CPT | Mod: PBBFAC,25 | Performed by: OTOLARYNGOLOGY

## 2022-08-09 PROCEDURE — 3077F SYST BP >= 140 MM HG: CPT | Mod: CPTII,,, | Performed by: OTOLARYNGOLOGY

## 2022-08-09 PROCEDURE — 1159F PR MEDICATION LIST DOCUMENTED IN MEDICAL RECORD: ICD-10-PCS | Mod: CPTII,,, | Performed by: OTOLARYNGOLOGY

## 2022-08-09 PROCEDURE — 3080F PR MOST RECENT DIASTOLIC BLOOD PRESSURE >= 90 MM HG: ICD-10-PCS | Mod: CPTII,,, | Performed by: OTOLARYNGOLOGY

## 2022-08-09 PROCEDURE — 1159F MED LIST DOCD IN RCRD: CPT | Mod: CPTII,,, | Performed by: OTOLARYNGOLOGY

## 2022-08-09 RX ORDER — LIDOCAINE HYDROCHLORIDE 10 MG/ML
1 INJECTION, SOLUTION EPIDURAL; INFILTRATION; INTRACAUDAL; PERINEURAL ONCE
Status: CANCELLED | OUTPATIENT
Start: 2022-08-09 | End: 2022-08-09

## 2022-08-09 RX ORDER — DEXAMETHASONE SODIUM PHOSPHATE 4 MG/ML
8 INJECTION, SOLUTION INTRA-ARTICULAR; INTRALESIONAL; INTRAMUSCULAR; INTRAVENOUS; SOFT TISSUE
Status: CANCELLED | OUTPATIENT
Start: 2022-08-09

## 2022-08-09 NOTE — PATIENT INSTRUCTIONS
MICROLARYNGOSCOPY    Description  Laryngoscopy is a procedure in which the surgeon closely examines the larynx (voice box). The key instrument for laryngoscopy is the laryngoscope, which is a hollow metal tube inserted into the mouth. Microlaryngoscopy entails--at minimum--a magnified examination of the larynx using a microscope and/or telescopes. This is performed in the operating room. This allows the surgeon to achieve a diagnosis and also to perform precise treatment for problems on the vocal folds (vocal cords) or other parts of the larynx. Additional interventions may be performed in conjunction with microlaryngoscopy. Common interventions include but are not limited to  Biopsy  Removal of abnormal tissue (polyps, cysts, nodules, leukoplakia)  Resection of cancer  Laser treatment of abnormal tissue (papilloma, leukoplakia)  Vocal fold injection augmentation  Injection of medication (steroid, Avastin)    Instructions: before the procedure  NPO after midnight: This is a medical expression for nothing to eat or drink after midnight. It is important to refrain from eating or drinking anything after midnight the night before your surgery.   Please refrain from taking any anti-platelet medications such as aspirin; ibuprofen (Advil, Motrin); Aleve; or Plavix (clopidogrel) for 7 days prior to the procedure.  If you usually take Coumadin (warfarin), you may need to stop using this a few days prior to the injection.   If you are any type of blood thinning (anti-platelet or anti-coagulant) medication and it is not clear what you should do, please clarify this with your surgeon ahead of time. In some cases we rely on other physicians such as cardiologists or hematologists to help with these decisions.  Diabetes precautions: If you are usually take oral diabetes medications (such as metformin), refrain from taking your morning dose the day of the procedure and use sliding-scale insulin for blood sugar  control.  On the morning of your procedure, it is OK to take your other regular morning medications with a small sip of water. It is particularly important to take any medications that treat heart problems (such as blood pressure, heart rate, heart rhythm) and lung problems  (asthma, COPD). Otherwise, please remember: nothing to eat or drink after midnight.      What to expect during the procedure  Microlaryngoscopy is performed in the operating room while you are asleep under general anesthesia. In most instances, you can expect to be under general anesthesia for approximately 1 to 1 ½ hours.  Nevertheless, the duration of the procedure varies depending on the indication for surgery, intraoperative findings, and other patient-specific/anatomic issues. Our primary concerns are your safety and comfort. Our secondary goal is to provide you with the best possible surgical treatment for your problem. Your surgery will take as long as necessary to accomplish these goals.    What to expect afterwards  The laryngoscope is inserted through the mouth and presses on the tongue. The most common postoperative issues patients encounter are related to the laryngoscope being positioned in the mouth. The extent of these issues is related to patient-specific anatomic issues, the indications for surgery, and the duration of the procedure.    Pain. We will do everything we can to make sure you are as comfortable as possible. Most patients experience very little discomfort after this surgery. Nevertheless, you should expect some soreness in the mouth and throat. Because the ear and the throat share the same sensory nerve, you may also experience some discomfort in the ear. The discomfort is usually worst for the first 48-72 hours and usually resolves within a week.    Laceration. Some patients may notice a small cut in the tongue or in another part of the mouth or throat. This may result in a minor about of blood-tinged saliva for the  first 24 hours. This usually heals on its own over the course of a week.    Other tongue problems. As the scope presses down on the tongue, the taste buds get compressed. In addition, sometimes the nerves to the tongue also get compressed. As a result, some patients notice a disturbance in taste, numbness of the tongue, or (even more rarely) weakness of the tongue after surgery. Although sometimes it may take several weeks to months for the problem to completely go away, the tongue problems are temporary in almost every instance.    Tooth problems. Reinforced mouth guards are placed on the teeth to protect them during the surgery and we give a great deal of care and attention to minimizing any pressure on the teeth. However, a chipped or cracked tooth, loss of a tooth, and/or other tooth irregularities are rare but well-recognized complications of laryngoscopy.    Jaw problems. You may experience some pain in the jaw joints (in front of the ears). Even less frequent would be dislocation of the joint. This usually occurs in patients who already have jaw problems.    Instructions: after the procedure  Voice rest. In many instances, we will recommend COMPLETE VOICE REST until your follow-up visit with your surgeon. This means COMPLETE SILENCE - NO TALKING, NO COUGHING, NO WHISPERING. Please see additional information on how to manage complete voice rest. Even if voice rest is not prescribed, for the first week, you should avoid speaking over heavy background noise or in a very loud voice.   Please call our office at [phone] if  You have a temperature above 101°F  You develop Increasing pain not relieved by medications  You have any other questions or concerns  Please go immediately to the nearest emergency room if you are experiencing  Shortness of breath  Difficulty breathing  Severe bleeding

## 2022-08-09 NOTE — PROGRESS NOTES
[facility]  Department of Otorhinolaryngology and Communication Sciences    Shelly Gannon Jr. is a 47 y.o. male who [facility] for consultation at the kind request of Dr. Rafa Morse for further management of hoarseness.     He complains of hoarseness, beginning acutely about 2 mo ago with a bad cough. His voice cuts out on him and and fatigued and effortful. Time course is constant. Symptoms are now stable, but his voice was very bad at onset of symptoms such that he was unable to work. Exacerbating factors include voice use. He denies any alleviating factors. He denies any associated symptoms.  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 She has a history of a right VF lesion requiring surgery in [place] in 2008. He was back on stage the following day with no limitations.    He has had 1 session of voice eval/treat with SAV Ayon at [facility].    Past Medical History  He has a past medical history of Allergy, Anxiety, Hypertension, Migraine headache, and Migraine syndrome.    Past Surgical History  He has a past surgical history that includes Vocal fold lesion excision (2008); Fracture surgery; Lumbar disc surgery (Bilateral); Back surgery; and Left heart catheterization (Left, 9/19/2019).    Family History  His family history includes Allergic rhinitis in his mother; Cancer in his maternal aunt; Heart disease in his maternal aunt and mother; Heart disease (age of onset: 49) in his father; Hypertension in his mother; Stroke in his mother.    Social History  He reports that he has never smoked. He has never used smokeless tobacco. He reports that he does not drink alcohol and does not use drugs.    Allergies  He is allergic to ace inhibitors.    Medications  He has a current medication list which includes the following prescription(s): aspirin, atorvastatin, hydrochlorothiazide, losartan, metoprolol succinate, fish oil-omega-3 fatty acids, pantoprazole, and prochlorperazine, and the  following Facility-Administered Medications: sodium chloride 0.9%.    Review of Systems   Constitutional: Negative for fever.   HENT: Negative for sore throat.    Eyes: Negative for visual disturbance.   Respiratory: Negative for wheezing.    Cardiovascular: Negative for chest pain.   Gastrointestinal: Negative for nausea.   Musculoskeletal: Negative for arthralgias.   Skin: Negative for rash.   Neurological: Negative for tremors.   Hematological: Does not bruise/bleed easily.   Psychiatric/Behavioral: The patient is not nervous/anxious.           Objective:     VS reviewed  Physical Exam  Constitutional: comfortable, well dressed  Psychiatric: appropriate affect  Respiratory: comfortably breathing, symmetric chest rise, no stridor  Voice: mild to severe variable strain, intermittent brief breathy diplophonia  Cardiovascular: upper extremities non-edematous  Lymphatic: no cervical lymphadenopathy  Neurologic: alert and oriented to time, place, person, and situation; cranial nerves 3-12 grossly intact  Head: normocephalic  Eyes: conjunctivae and sclerae clear  Ears: normal pinnae, normal external auditory canals, tympanic membranes intact  Nose: mucosa pink and noncongested, no masses, no mucopurulence, no polyps  Oral cavity / oropharynx: no mucosal lesions  Neck: soft, full range of motion, laryngotracheal complex palpable with appropriate landmarks, larynx elevates on swallowing  Indirect laryngoscopy: limited due to gag    Procedure  Rigid Laryngeal Videostroboscopy (12047): Laryngeal videostroboscopy is indicated to assess the laryngeal vibratory biomechanics and vocal fold oscillation, which cannot be assessed with a plain light examination. This was carried out with a 70 degree endoscope. After verbal consent was obtained, the patient was positioned and the tongue was gently secured with a gauze sponge. The endoscope was passed transorally and positioned to image the larynx and hypopharynx in detail. The  following features were examined: laryngeal and hypopharyngeal masses; vocal fold range and symmetry of motion; laryngeal mucosal edema, erythema, inflammation, and hydration; salivary pooling; and gross laryngeal sensation. During phonation, the vocal folds were assessed for glottal closure; mucosal wave; vocal fold lesions; vibratory periodicity, amplitude, and phase symmetry; and vertical height match. The equipment was removed. The patient tolerated the procedure well without complication. All findings were normal except:  - small keratotic polypoid lesion at junction of anterior/middle thirds of left true vocal fold  - mild sessile infraglottic thickening with adjacent sulcus at junction of anterior/middle thirds of right true vocal fold  - complete closure in low/modal frequencies and at exuberant volume  - variable mild insufficiency with anterior/posterior gap  - variable concentric supraglottic hyperfunction      Assessment:     Shelly Gannon Jr. is a 47 y.o. male with a left vocal fold phonotraumatic lesion, contralateral phonotraumatic changes, a history of right sided microlaryngeal surgery, as a conseuqence of which he is experiencing dysphonia negatively impacting his quality of life.       Plan:        I had a discussion with the patient regarding his condition and the further workup and management options.      The patient is motivated to treat the problem surgically due to its chronicity as well as his prior history of effective surgical treatment.    I discussed the risks, benefits and alternatives to microlaryngeal surgery with the patient, as well as the expected postoperative course. Risks included but were not limited to pain; bleeding; infection; scarring; worsening of voice; recurrence; need for additional and/or more extensive procedures; oral/dental problems (pain, laceration, broken or missing teeth); jaw joint problems (pain, dislocation); and tongue problems (pain, laceration,  numbness, weakness, taste disturbance). Any surgery on the larynx also carries with it the risks of airway obstruction necessitating tracheotomy. Also inherent in the procedure are the risks of general anesthesia, including but not limited to cardiovascular complications (heart attack, arrhythmia); pulmonary (respiratory failure); neurologic (stroke); and death. If the laser is used, it presents the risks of vision loss and fire.    SLP voice evaluation and subsequent therapy sessions are medically necessary for restoration of laryngeal function. This has been initiated with my colleague SAV Ayon at [facility]. This will pick back up in the postop period.     All questions were answered, and the patient is in agreement with the above.     José Wiggins M.D.  [facility]  Department of Otorhinolaryngology and Communication Sciences

## 2022-08-09 NOTE — H&P (VIEW-ONLY)
[facility]  Department of Otorhinolaryngology and Communication Sciences    Shelly Gannon Jr. is a 47 y.o. male who presents to the [facility] for consultation at the kind request of Dr. Rafa Morse for further management of hoarseness.     He complains of hoarseness, beginning acutely about 2 mo ago with a bad cough. His voice cuts out on him and and fatigued and effortful. Time course is constant. Symptoms are now stable, but his voice was very bad at onset of symptoms such that he was unable to work. Exacerbating factors include voice use. He denies any alleviating factors. He denies any associated symptoms.  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 She has a history of a right VF lesion requiring surgery in [place] in 2008. He was back on stage the following day with no limitations.    He has had 1 session of voice eval/treat with SAV Ayon at [facility].    Past Medical History  He has a past medical history of Allergy, Anxiety, Hypertension, Migraine headache, and Migraine syndrome.    Past Surgical History  He has a past surgical history that includes Vocal fold lesion excision (2008); Fracture surgery; Lumbar disc surgery (Bilateral); Back surgery; and Left heart catheterization (Left, 9/19/2019).    Family History  His family history includes Allergic rhinitis in his mother; Cancer in his maternal aunt; Heart disease in his maternal aunt and mother; Heart disease (age of onset: 49) in his father; Hypertension in his mother; Stroke in his mother.    Social History  He reports that he has never smoked. He has never used smokeless tobacco. He reports that he does not drink alcohol and does not use drugs.    Allergies  He is allergic to ace inhibitors.    Medications  He has a current medication list which includes the following prescription(s): aspirin, atorvastatin, hydrochlorothiazide, losartan, metoprolol succinate, fish oil-omega-3 fatty acids, pantoprazole, and prochlorperazine, and the  following Facility-Administered Medications: sodium chloride 0.9%.    Review of Systems   Constitutional: Negative for fever.   HENT: Negative for sore throat.    Eyes: Negative for visual disturbance.   Respiratory: Negative for wheezing.    Cardiovascular: Negative for chest pain.   Gastrointestinal: Negative for nausea.   Musculoskeletal: Negative for arthralgias.   Skin: Negative for rash.   Neurological: Negative for tremors.   Hematological: Does not bruise/bleed easily.   Psychiatric/Behavioral: The patient is not nervous/anxious.           Objective:     VS reviewed  Physical Exam  Constitutional: comfortable, well dressed  Psychiatric: appropriate affect  Respiratory: comfortably breathing, symmetric chest rise, no stridor  Voice: mild to severe variable strain, intermittent brief breathy diplophonia  Cardiovascular: upper extremities non-edematous  Lymphatic: no cervical lymphadenopathy  Neurologic: alert and oriented to time, place, person, and situation; cranial nerves 3-12 grossly intact  Head: normocephalic  Eyes: conjunctivae and sclerae clear  Ears: normal pinnae, normal external auditory canals, tympanic membranes intact  Nose: mucosa pink and noncongested, no masses, no mucopurulence, no polyps  Oral cavity / oropharynx: no mucosal lesions  Neck: soft, full range of motion, laryngotracheal complex palpable with appropriate landmarks, larynx elevates on swallowing  Indirect laryngoscopy: limited due to gag    Procedure  Rigid Laryngeal Videostroboscopy (42629): Laryngeal videostroboscopy is indicated to assess the laryngeal vibratory biomechanics and vocal fold oscillation, which cannot be assessed with a plain light examination. This was carried out with a 70 degree endoscope. After verbal consent was obtained, the patient was positioned and the tongue was gently secured with a gauze sponge. The endoscope was passed transorally and positioned to image the larynx and hypopharynx in detail. The  following features were examined: laryngeal and hypopharyngeal masses; vocal fold range and symmetry of motion; laryngeal mucosal edema, erythema, inflammation, and hydration; salivary pooling; and gross laryngeal sensation. During phonation, the vocal folds were assessed for glottal closure; mucosal wave; vocal fold lesions; vibratory periodicity, amplitude, and phase symmetry; and vertical height match. The equipment was removed. The patient tolerated the procedure well without complication. All findings were normal except:  - small keratotic polypoid lesion at junction of anterior/middle thirds of left true vocal fold  - mild sessile infraglottic thickening with adjacent sulcus at junction of anterior/middle thirds of right true vocal fold  - complete closure in low/modal frequencies and at exuberant volume  - variable mild insufficiency with anterior/posterior gap  - variable concentric supraglottic hyperfunction      Assessment:     Shelly Gannon Jr. is a 47 y.o. male with a left vocal fold phonotraumatic lesion, contralateral phonotraumatic changes, a history of right sided microlaryngeal surgery, as a conseuqence of which he is experiencing dysphonia negatively impacting his quality of life.       Plan:        I had a discussion with the patient regarding his condition and the further workup and management options.      The patient is motivated to treat the problem surgically due to its chronicity as well as his prior history of effective surgical treatment.    I discussed the risks, benefits and alternatives to microlaryngeal surgery with the patient, as well as the expected postoperative course. Risks included but were not limited to pain; bleeding; infection; scarring; worsening of voice; recurrence; need for additional and/or more extensive procedures; oral/dental problems (pain, laceration, broken or missing teeth); jaw joint problems (pain, dislocation); and tongue problems (pain, laceration,  numbness, weakness, taste disturbance). Any surgery on the larynx also carries with it the risks of airway obstruction necessitating tracheotomy. Also inherent in the procedure are the risks of general anesthesia, including but not limited to cardiovascular complications (heart attack, arrhythmia); pulmonary (respiratory failure); neurologic (stroke); and death. If the laser is used, it presents the risks of vision loss and fire.    SLP voice evaluation and subsequent therapy sessions are medically necessary for restoration of laryngeal function. This has been initiated with my colleague SAV Ayon at [facility]. This will pick back up in the postop period.     All questions were answered, and the patient is in agreement with the above.     José Wiggins M.D.  [facility]  Department of Otorhinolaryngology and Communication Sciences

## 2022-08-16 NOTE — ANESTHESIA PAT ROS NOTE
08/16/2022  Shelly Gannon Jr. is a 47 y.o., male.      Pre-op Assessment          Review of Systems  Anesthesia Hx:  Denies Hx of Anesthetic complications  History of prior surgery of interest to airway management or planning: Previous anesthesia: General LUMBAR DISC SURGERY 2016 with general anesthesia.  Denies Family Hx of Anesthesia complications.   Denies Personal Hx of Anesthesia complications.   Social:  Non-Smoker, No Alcohol Use    Hematology/Oncology:  Hematology Normal   Oncology Normal     EENT/Dental:   DYSPHONIA. LARYNX NEOPLASM BENIGN.   Cardiovascular:   Hypertension  Denies Angina. hyperlipidemia  Functional Capacity good / => 4 METS    Pulmonary:   Denies Shortness of breath.  Denies Recent URI.    Renal/:  Renal/ Normal     Hepatic/GI:   GERD    Musculoskeletal:  Lumbar Spine Disorders, Lumbar Disc Disease (S/P SURGICAL INTERVENTION (L4-L5) 2016)   Neurological:  Dx of Headaches, Migraine Headache   Endocrine:  Obesity / BMI > 30  Psych:   anxiety             Anesthesia Assessment: Preoperative EQUATION    Planned Procedure: Procedure(s) (LRB):  Suspension microlaryngoscopy with KTP laser assisted excision of lesion (N/A)  Requested Anesthesia Type:General  Surgeon: José Wiggins MD  Service: ENT  Known or anticipated Date of Surgery:8/18/2022    Surgeon notes: reviewed    Electronic QUestionnaire Assessment completed via nurse interview with patient.        Triage considerations:     The patient has no apparent active cardiac condition (No unstable coronary Syndrome such as severe unstable angina or recent [<1 month] myocardial infarction, decompensated CHF, severe valvular   disease or significant arrhythmia)    Previous anesthesia records:Not available    Last PCP note: within 3 months , within [facility] [place]   Subspecialty notes: ENT    Other important co-morbidities: GERD,  HLD, HTN and Obesity      Tests already available:  Available tests,  3-6 months ago , 6-12 months ago , within [facility] .      4/23/22  EKG  BNP  TROPONIN  CMP  CBC    1/21/22  ECHO - EF 65%  STRESS TEST - NEGATIVE            Instructions given. (See in Nurse's note)    Optimization:  Anesthesia Preop Clinic Assessment NOT Indicated    Medical Opinion NOT Indicated           Plan:    Testing:  BMP-AM OF SURGERY           Patient  has previously scheduled Medical Appointment: NOT AT THIS TIME    Navigation: Tests Scheduled.

## 2022-08-17 ENCOUNTER — TELEPHONE (OUTPATIENT)
Dept: OTOLARYNGOLOGY | Facility: CLINIC | Age: 47
End: 2022-08-17
Payer: MEDICAID

## 2022-08-17 ENCOUNTER — PATIENT MESSAGE (OUTPATIENT)
Dept: OTOLARYNGOLOGY | Facility: CLINIC | Age: 47
End: 2022-08-17
Payer: MEDICAID

## 2022-08-17 ENCOUNTER — ANESTHESIA EVENT (OUTPATIENT)
Dept: SURGERY | Facility: HOSPITAL | Age: 47
End: 2022-08-17
Payer: MEDICAID

## 2022-08-17 NOTE — ANESTHESIA PREPROCEDURE EVALUATION
[facility]  Anesthesia Pre-Operative Evaluation         Patient Name: Shelly Gannon Jr.  YOB: 1975  MRN: [id number]    SUBJECTIVE:     Pre-operative evaluation for Procedure(s) (LRB):  Suspension microlaryngoscopy with KTP laser assisted excision of lesion (N/A)     08/17/2022    Shelly Gannon Jr. is a 47 y.o. male w/ a significant PMHx of HTN, GERD, and left vocal fold lesion.    Patient now presents for the above procedure(s).      LDA: None documented.       Prev airway: None documented.    Drips: None documented.      Patient Active Problem List   Diagnosis    Knee bursitis    Palpitations    Obesity (BMI 30-39.9)    Essential hypertension    Gastroesophageal reflux disease with esophagitis    Abnormal stress test    Chest pain       Review of patient's allergies indicates:   Allergen Reactions    Ace inhibitors Other (See Comments)     cough       Current Inpatient Medications:      Current Facility-Administered Medications on File Prior to Encounter   Medication Dose Route Frequency Provider Last Rate Last Admin    0.9%  NaCl infusion   Intravenous Continuous Ronnie Bailey MD 75 mL/hr at 09/19/19 0930 New Bag at 09/19/19 0930     Current Outpatient Medications on File Prior to Encounter   Medication Sig Dispense Refill    losartan (COZAAR) 25 MG tablet Take 1 tablet (25 mg total) by mouth once daily. 90 tablet 3    metoprolol succinate (TOPROL-XL) 25 MG 24 hr tablet Take 1 tablet (25 mg total) by mouth once daily. 90 tablet 3    multivitamin capsule Take 1 capsule by mouth once daily.      omega-3 fatty acids/fish oil (FISH OIL-OMEGA-3 FATTY ACIDS) 300-1,000 mg capsule Take 1 capsule by mouth once daily.      pantoprazole (PROTONIX) 40 MG tablet Take 1 tablet (40 mg total) by mouth once daily. 30 tablet 11    aspirin (ECOTRIN) 81 MG EC tablet Take 81 mg by mouth once daily.      atorvastatin (LIPITOR) 40 MG tablet Take 1 tablet (40 mg total) by  mouth every evening. 90 tablet 3    prochlorperazine (COMPAZINE) 10 MG tablet Take 1 tablet (10 mg total) by mouth every 6 (six) hours as needed. 20 tablet 0       Past Surgical History:   Procedure Laterality Date    BACK SURGERY      FRACTURE SURGERY      LEFT HEART CATHETERIZATION Left 9/19/2019    Procedure: CATHETERIZATION, HEART, LEFT  (RIGHT RADIAL ACCESS);  Surgeon: Ronnie Bailey MD;  Location: [facility] CATH/EP LAB;  Service: Cardiology;  Laterality: Left;    LUMBAR DISC SURGERY Bilateral     L4- L5 , 2016 April,  Trans    VOCAL FOLD LESION EXCISION  2008       Social History     Socioeconomic History    Marital status: Single   Tobacco Use    Smoking status: Never Smoker    Smokeless tobacco: Never Used   Substance and Sexual Activity    Alcohol use: No    Drug use: No    Sexual activity: Yes     Partners: Female       OBJECTIVE:     Vital Signs Range (Last 24H):         Significant Labs:  Lab Results   Component Value Date    WBC 4.14 04/23/2022    HGB 15.2 04/23/2022    HCT 44.3 04/23/2022     04/23/2022    CHOL 203 (H) 12/09/2020    TRIG 220 (H) 12/09/2020    HDL 31 (L) 12/09/2020    ALT 25 04/23/2022    AST 17 04/23/2022     04/23/2022    K 4.2 04/23/2022     04/23/2022    CREATININE 1.2 04/23/2022    BUN 14 04/23/2022    CO2 29 04/23/2022    TSH 1.531 05/05/2015    INR 1.1 01/28/2021       Diagnostic Studies: No relevant studies.    EKG:   Results for orders placed or performed during the hospital encounter of 04/23/22   EKG 12-lead    Collection Time: 04/23/22  6:11 PM    Narrative    Test Reason : R42,    Vent. Rate : 056 BPM     Atrial Rate : 056 BPM     P-R Int : 168 ms          QRS Dur : 084 ms      QT Int : 422 ms       P-R-T Axes : 048 033 003 degrees     QTc Int : 407 ms    Sinus bradycardia  Nonspecific T wave abnormality  Abnormal ECG  When compared with ECG of 21-JAN-2022 07:21,  No significant change was found  Confirmed by Rainer Will MD ([id number]) on  4/25/2022 1:29:36 PM    Referred By: [email or web address]   SELF           Confirmed By:Rainer Will MD       2D ECHO:  TTE:  Results for orders placed or performed during the hospital encounter of 01/20/22   Echo Saline Bubble? No   Result Value Ref Range    BSA 2.32 m2    TDI SEPTAL 0.07 m/s    LV LATERAL E/E' RATIO 3.83 m/s    LV SEPTAL E/E' RATIO 6.57 m/s    AORTIC VALVE CUSP SEPERATION 2.45 cm    TDI LATERAL 0.12 m/s    PV PEAK VELOCITY 115.38 cm/s    LVIDd 4.88 3.5 - 6.0 cm    IVS 1.37 (A) 0.6 - 1.1 cm    Posterior Wall 1.37 (A) 0.6 - 1.1 cm    Ao root annulus 3.20 cm    LVIDs 3.43 2.1 - 4.0 cm    FS 30 28 - 44 %    LV mass 272.03 g    LA size 3.91 cm    RVDD 219.00 cm    Left Ventricle Relative Wall Thickness 0.56 cm    AV mean gradient 3 mmHg    AV valve area 4.04 cm2    AV Velocity Ratio 76.58     AV index (prosthetic) 0.79     E/A ratio 1.21     Mean e' 0.10 m/s    E wave deceleration time 240.35 msec    IVRT 87.02 msec    LVOT diameter 2.56 cm    LVOT area 5.1 cm2    LVOT peak tyree 86.53 m/s    LVOT peak VTI 15.29 cm    Ao peak tyree 1.13 m/s    Ao VTI 19.45 cm    LVOT stroke volume 78.66 cm3    AV peak gradient 5 mmHg    E/E' ratio 4.84 m/s    MV Peak E Tyree 0.46 m/s    MV Peak A Tyree 0.38 m/s    LV Mass Index 120 g/m2    Right Atrial Pressure (from IVC) 3 mmHg    EF 65 %    Narrative    · The left ventricle is normal in size with concentric hypertrophy and   normal systolic function.  · The estimated ejection fraction is 65%.  · Normal left ventricular diastolic function.  · Atrial fibrillation not observed.  · Normal right ventricular size with normal right ventricular systolic   function.  · Normal central venous pressure (3 mmHg).          KELI:  No results found for this or any previous visit.    ASSESSMENT/PLAN:                                                                                                                  08/17/2022  Shelly Gannon Jr. is a 47 y.o., male.      Pre-op Assessment    I  have reviewed the Patient Summary Reports.     I have reviewed the Nursing Notes. I have reviewed the NPO Status.   I have reviewed the Medications.     Review of Systems  Anesthesia Hx:  No problems with previous Anesthesia  History of prior surgery of interest to airway management or planning: Denies Family Hx of Anesthesia complications.   Denies Personal Hx of Anesthesia complications.   Social:  Non-Smoker    Cardiovascular:   Hypertension    Pulmonary:  Pulmonary Normal    Renal/:  Renal/ Normal     Hepatic/GI:   GERD    Neurological:  Neurology Normal    Endocrine:  Endocrine Normal    Psych:  Psychiatric Normal              Anesthesia Plan  Type of Anesthesia, risks & benefits discussed:    Anesthesia Type: Gen ETT  Intra-op Monitoring Plan: Standard ASA Monitors  Post Op Pain Control Plan: multimodal analgesia  Induction:  IV  Airway Plan: Direct, Post-Induction  Informed Consent: Informed consent signed with the Patient and all parties understand the risks and agree with anesthesia plan.  All questions answered.   ASA Score: 2  Day of Surgery Review of History & Physical: H&P Update referred to the surgeon/provider.    Ready For Surgery From Anesthesia Perspective.     .

## 2022-08-18 ENCOUNTER — ANESTHESIA (OUTPATIENT)
Dept: SURGERY | Facility: HOSPITAL | Age: 47
End: 2022-08-18
Payer: MEDICAID

## 2022-08-18 ENCOUNTER — HOSPITAL ENCOUNTER (OUTPATIENT)
Facility: HOSPITAL | Age: 47
Discharge: HOME | End: 2022-08-18
Attending: OTOLARYNGOLOGY | Admitting: OTOLARYNGOLOGY
Payer: MEDICAID

## 2022-08-18 VITALS
TEMPERATURE: 98 F | WEIGHT: 237 LBS | HEART RATE: 77 BPM | SYSTOLIC BLOOD PRESSURE: 143 MMHG | OXYGEN SATURATION: 97 % | HEIGHT: 72 IN | RESPIRATION RATE: 24 BRPM | DIASTOLIC BLOOD PRESSURE: 87 MMHG | BODY MASS INDEX: 32.1 KG/M2

## 2022-08-18 DIAGNOSIS — R49.0 DYSPHONIA: Primary | ICD-10-CM

## 2022-08-18 DIAGNOSIS — D14.1 LARYNX NEOPLASM BENIGN: ICD-10-CM

## 2022-08-18 DIAGNOSIS — Z01.818 PRE-OP TESTING: ICD-10-CM

## 2022-08-18 LAB
ANION GAP SERPL CALC-SCNC: 8 MMOL/L (ref 8–16)
BUN SERPL-MCNC: 11 MG/DL (ref 6–20)
CALCIUM SERPL-MCNC: 9.3 MG/DL (ref 8.7–10.5)
CHLORIDE SERPL-SCNC: 107 MMOL/L (ref 95–110)
CO2 SERPL-SCNC: 26 MMOL/L (ref 23–29)
CREAT SERPL-MCNC: 1.2 MG/DL (ref 0.5–1.4)
CTP QC/QA: YES
EST. GFR  (NO RACE VARIABLE): >60 ML/MIN/1.73 M^2
GLUCOSE SERPL-MCNC: 94 MG/DL (ref 70–110)
POTASSIUM SERPL-SCNC: 3.4 MMOL/L (ref 3.5–5.1)
SARS-COV-2 AG RESP QL IA.RAPID: NEGATIVE
SODIUM SERPL-SCNC: 141 MMOL/L (ref 136–145)

## 2022-08-18 PROCEDURE — 25000003 PHARM REV CODE 250

## 2022-08-18 PROCEDURE — 25000003 PHARM REV CODE 250: Performed by: OTOLARYNGOLOGY

## 2022-08-18 PROCEDURE — 63600175 PHARM REV CODE 636 W HCPCS

## 2022-08-18 PROCEDURE — 37000009 HC ANESTHESIA EA ADD 15 MINS: Performed by: OTOLARYNGOLOGY

## 2022-08-18 PROCEDURE — 00320 ANES ALL PX NECK NOS 1YR/>: CPT | Performed by: OTOLARYNGOLOGY

## 2022-08-18 PROCEDURE — 31541 LARYNSCOP W/TUMR EXC + SCOPE: CPT | Mod: ,,, | Performed by: OTOLARYNGOLOGY

## 2022-08-18 PROCEDURE — 71000015 HC POSTOP RECOV 1ST HR: Performed by: OTOLARYNGOLOGY

## 2022-08-18 PROCEDURE — 31541 PR LARYNGOSCOPY,DIRCT,OP SCOP,EXC TUMR: ICD-10-PCS | Mod: ,,, | Performed by: OTOLARYNGOLOGY

## 2022-08-18 PROCEDURE — D9220A PRA ANESTHESIA: Mod: ,,, | Performed by: ANESTHESIOLOGY

## 2022-08-18 PROCEDURE — 71000044 HC DOSC ROUTINE RECOVERY FIRST HOUR: Performed by: OTOLARYNGOLOGY

## 2022-08-18 PROCEDURE — 80048 BASIC METABOLIC PNL TOTAL CA: CPT | Performed by: ANESTHESIOLOGY

## 2022-08-18 PROCEDURE — 27201423 OPTIME MED/SURG SUP & DEVICES STERILE SUPPLY: Performed by: OTOLARYNGOLOGY

## 2022-08-18 PROCEDURE — 88305 TISSUE EXAM BY PATHOLOGIST: CPT | Performed by: PATHOLOGY

## 2022-08-18 PROCEDURE — D9220A PRA ANESTHESIA: ICD-10-PCS | Mod: ,,, | Performed by: ANESTHESIOLOGY

## 2022-08-18 PROCEDURE — 88305 TISSUE EXAM BY PATHOLOGIST: ICD-10-PCS | Mod: 26,,, | Performed by: PATHOLOGY

## 2022-08-18 PROCEDURE — 63600175 PHARM REV CODE 636 W HCPCS: Performed by: OTOLARYNGOLOGY

## 2022-08-18 PROCEDURE — 36000709 HC OR TIME LEV III EA ADD 15 MIN: Performed by: OTOLARYNGOLOGY

## 2022-08-18 PROCEDURE — 88305 TISSUE EXAM BY PATHOLOGIST: CPT | Mod: 26,,, | Performed by: PATHOLOGY

## 2022-08-18 PROCEDURE — 36000708 HC OR TIME LEV III 1ST 15 MIN: Performed by: OTOLARYNGOLOGY

## 2022-08-18 PROCEDURE — 37000008 HC ANESTHESIA 1ST 15 MINUTES: Performed by: OTOLARYNGOLOGY

## 2022-08-18 RX ORDER — HYDROCODONE BITARTRATE AND ACETAMINOPHEN 5; 325 MG/1; MG/1
1 TABLET ORAL EVERY 6 HOURS PRN
Qty: 10 TABLET | Refills: 0 | OUTPATIENT
Start: 2022-08-18 | End: 2022-11-25

## 2022-08-18 RX ORDER — MIDAZOLAM HYDROCHLORIDE 1 MG/ML
INJECTION, SOLUTION INTRAMUSCULAR; INTRAVENOUS
Status: DISCONTINUED | OUTPATIENT
Start: 2022-08-18 | End: 2022-08-18

## 2022-08-18 RX ORDER — LIDOCAINE HYDROCHLORIDE 10 MG/ML
1 INJECTION, SOLUTION EPIDURAL; INFILTRATION; INTRACAUDAL; PERINEURAL ONCE
Status: COMPLETED | OUTPATIENT
Start: 2022-08-18 | End: 2022-08-18

## 2022-08-18 RX ORDER — KETAMINE HCL IN 0.9 % NACL 50 MG/5 ML
SYRINGE (ML) INTRAVENOUS
Status: DISCONTINUED | OUTPATIENT
Start: 2022-08-18 | End: 2022-08-18

## 2022-08-18 RX ORDER — ONDANSETRON 2 MG/ML
INJECTION INTRAMUSCULAR; INTRAVENOUS
Status: DISCONTINUED | OUTPATIENT
Start: 2022-08-18 | End: 2022-08-18

## 2022-08-18 RX ORDER — LIDOCAINE HYDROCHLORIDE 40 MG/ML
INJECTION, SOLUTION RETROBULBAR
Status: DISCONTINUED | OUTPATIENT
Start: 2022-08-18 | End: 2022-08-18 | Stop reason: HOSPADM

## 2022-08-18 RX ORDER — ACETAMINOPHEN 500 MG
500 TABLET ORAL EVERY 6 HOURS PRN
Qty: 20 TABLET | Refills: 0 | Status: SHIPPED | OUTPATIENT
Start: 2022-08-18 | End: 2022-08-18 | Stop reason: HOSPADM

## 2022-08-18 RX ORDER — IBUPROFEN 600 MG/1
600 TABLET ORAL EVERY 6 HOURS PRN
Qty: 20 TABLET | Refills: 0 | Status: SHIPPED | OUTPATIENT
Start: 2022-08-18 | End: 2022-08-18 | Stop reason: HOSPADM

## 2022-08-18 RX ORDER — HYDROMORPHONE HYDROCHLORIDE 1 MG/ML
0.2 INJECTION, SOLUTION INTRAMUSCULAR; INTRAVENOUS; SUBCUTANEOUS EVERY 5 MIN PRN
Status: DISCONTINUED | OUTPATIENT
Start: 2022-08-18 | End: 2022-08-18 | Stop reason: HOSPADM

## 2022-08-18 RX ORDER — PROPOFOL 10 MG/ML
VIAL (ML) INTRAVENOUS CONTINUOUS PRN
Status: DISCONTINUED | OUTPATIENT
Start: 2022-08-18 | End: 2022-08-18

## 2022-08-18 RX ORDER — ONDANSETRON 4 MG/1
4 TABLET, ORALLY DISINTEGRATING ORAL 2 TIMES DAILY
Qty: 10 TABLET | Refills: 0 | Status: ON HOLD | OUTPATIENT
Start: 2022-08-18 | End: 2023-03-23 | Stop reason: HOSPADM

## 2022-08-18 RX ORDER — EPINEPHRINE 1 MG/ML
INJECTION, SOLUTION INTRACARDIAC; INTRAMUSCULAR; INTRAVENOUS; SUBCUTANEOUS
Status: DISCONTINUED | OUTPATIENT
Start: 2022-08-18 | End: 2022-08-18 | Stop reason: HOSPADM

## 2022-08-18 RX ORDER — PROPOFOL 10 MG/ML
VIAL (ML) INTRAVENOUS
Status: DISCONTINUED | OUTPATIENT
Start: 2022-08-18 | End: 2022-08-18

## 2022-08-18 RX ORDER — SODIUM CHLORIDE 0.9 % (FLUSH) 0.9 %
10 SYRINGE (ML) INJECTION
Status: DISCONTINUED | OUTPATIENT
Start: 2022-08-18 | End: 2022-08-18 | Stop reason: HOSPADM

## 2022-08-18 RX ORDER — HALOPERIDOL 5 MG/ML
0.5 INJECTION INTRAMUSCULAR EVERY 10 MIN PRN
Status: DISCONTINUED | OUTPATIENT
Start: 2022-08-18 | End: 2022-08-18 | Stop reason: HOSPADM

## 2022-08-18 RX ORDER — UBROGEPANT 50 MG/1
50 TABLET ORAL
COMMUNITY
End: 2023-11-28

## 2022-08-18 RX ORDER — DEXAMETHASONE SODIUM PHOSPHATE 4 MG/ML
8 INJECTION, SOLUTION INTRA-ARTICULAR; INTRALESIONAL; INTRAMUSCULAR; INTRAVENOUS; SOFT TISSUE
Status: DISCONTINUED | OUTPATIENT
Start: 2022-08-18 | End: 2022-08-18 | Stop reason: HOSPADM

## 2022-08-18 RX ORDER — ROCURONIUM BROMIDE 10 MG/ML
INJECTION, SOLUTION INTRAVENOUS
Status: DISCONTINUED | OUTPATIENT
Start: 2022-08-18 | End: 2022-08-18

## 2022-08-18 RX ORDER — FENTANYL CITRATE 50 UG/ML
INJECTION, SOLUTION INTRAMUSCULAR; INTRAVENOUS
Status: DISCONTINUED | OUTPATIENT
Start: 2022-08-18 | End: 2022-08-18

## 2022-08-18 RX ADMIN — ROCURONIUM BROMIDE 20 MG: 50 INJECTION INTRAVENOUS at 09:08

## 2022-08-18 RX ADMIN — Medication 100 MCG/KG/MIN: at 09:08

## 2022-08-18 RX ADMIN — GLYCOPYRROLATE 0.1 MG: 0.2 INJECTION INTRAMUSCULAR; INTRAVENOUS at 08:08

## 2022-08-18 RX ADMIN — FENTANYL CITRATE 100 MCG: 50 INJECTION, SOLUTION INTRAMUSCULAR; INTRAVENOUS at 09:08

## 2022-08-18 RX ADMIN — MIDAZOLAM HYDROCHLORIDE 2 MG: 1 INJECTION, SOLUTION INTRAMUSCULAR; INTRAVENOUS at 09:08

## 2022-08-18 RX ADMIN — ROCURONIUM BROMIDE 50 MG: 50 INJECTION INTRAVENOUS at 09:08

## 2022-08-18 RX ADMIN — SODIUM CHLORIDE: 0.9 INJECTION, SOLUTION INTRAVENOUS at 08:08

## 2022-08-18 RX ADMIN — PROPOFOL 50 MG: 10 INJECTION, EMULSION INTRAVENOUS at 09:08

## 2022-08-18 RX ADMIN — ROCURONIUM BROMIDE 30 MG: 50 INJECTION INTRAVENOUS at 09:08

## 2022-08-18 RX ADMIN — GLYCOPYRROLATE 0.1 MG: 0.2 INJECTION INTRAMUSCULAR; INTRAVENOUS at 09:08

## 2022-08-18 RX ADMIN — PROPOFOL 180 MG: 10 INJECTION, EMULSION INTRAVENOUS at 09:08

## 2022-08-18 RX ADMIN — SUGAMMADEX 400 MG: 100 INJECTION, SOLUTION INTRAVENOUS at 10:08

## 2022-08-18 RX ADMIN — ONDANSETRON 4 MG: 2 INJECTION INTRAMUSCULAR; INTRAVENOUS at 10:08

## 2022-08-18 RX ADMIN — LIDOCAINE HYDROCHLORIDE 50 MG: 10 INJECTION, SOLUTION EPIDURAL; INFILTRATION; INTRACAUDAL at 09:08

## 2022-08-18 RX ADMIN — Medication 20 MG: at 10:08

## 2022-08-18 RX ADMIN — DEXAMETHASONE SODIUM PHOSPHATE 8 MG: 4 INJECTION INTRA-ARTICULAR; INTRALESIONAL; INTRAMUSCULAR; INTRAVENOUS; SOFT TISSUE at 09:08

## 2022-08-18 NOTE — ANESTHESIA POSTPROCEDURE EVALUATION
Anesthesia Post Evaluation    Patient: Shelly Gannon Jr.    Procedure(s) Performed: Procedure(s) (LRB):  Suspension microlaryngoscopy with KTP laser assisted excision of lesion (N/A)    Final Anesthesia Type: general      Patient location during evaluation: PACU  Patient participation: Yes- Able to Participate  Level of consciousness: awake and alert  Post-procedure vital signs: reviewed and stable  Pain management: adequate  Airway patency: patent    PONV status at discharge: No PONV  Anesthetic complications: no      Cardiovascular status: stable  Respiratory status: unassisted and spontaneous ventilation  Hydration status: euvolemic  Follow-up not needed.          Vitals Value Taken Time   /87 08/18/22 1146   Temp 36.7 °C (98 °F) 08/18/22 1145   Pulse 81 08/18/22 1151   Resp 40 08/18/22 1150   SpO2 94 % 08/18/22 1151   Vitals shown include unvalidated device data.      No case tracking events are documented in the log.      Pain/Lobo Score: Lobo Score: 10 (8/18/2022 11:16 AM)

## 2022-08-18 NOTE — OP NOTE
DATE OF SERVICE: 08/18/2022    PRE-OPERATIVE DIAGNOSIS:  Bilateral vocal fold phono traumatic lesions.    POST-OPERATIVE DIAGNOSIS:   1. Left vocal fold hemorrhagic polyp with adjacent sulcus deformity.  2. Right vocal fold fibrous mass with adjacent sulcus deformity.    PROCEDURE(S):  Suspension microlaryngoscopy with excision of left vocal fold lesion and pulsed KTP laser treatment of bilateral vocal fold lesions.    SURGEON: José Wiggins M.D.    ASSISTANT:  John Carver MD    ANESTHESIA: General.    BLOOD LOSS: Less than 5 mL.    SPECIMENS:  Left vocal fold lesion.    COMPLICATIONS: None.    FINDINGS:   1. Exposure with 4 Zeitels laryngoscope.  2. Bilateral anterior saccular cysts.  3. Left vocal fold with irregular hemorrhagic polypoid lesion emanating from immediate infraglottic free edge with adjacent phono traumatic change and immediately cranial sulcus deformity.  4. Right vocal fold with deep sulcus deformity and, immediately caudal to it, broad-based fibrotic phono traumatic lesion.    CONDITION: Stable.    INDICATIONS FOR PROCEDURE:   This is a man with chronic dysphonia due to a left vocal fold phonotraumatic lesion, contralateral phonotraumatic changes.  He has history of right sided microlaryngeal surgery.  He presents today for micro laryngeal surgery. I discussed the risks, benefits and alternatives to microlaryngeal surgery with the patient, as well as the expected postoperative course. Risks included but were not limited to pain; bleeding; infection; scarring; worsening of voice; recurrence; need for additional and/or more extensive procedures; oral/dental problems (pain, laceration, broken or missing teeth); jaw joint problems (pain, dislocation); and tongue problems (pain, laceration, numbness, weakness, taste disturbance). Any surgery on the larynx also carries with it the risks of airway obstruction necessitating tracheotomy. Also inherent in the procedure are the risks of general anesthesia,  including but not limited to cardiovascular complications (heart attack, arrhythmia); pulmonary (respiratory failure); neurologic (stroke); and death. If the laser is used, it presents the risks of vision loss and fire.    PROCEDURE IN DETAIL:   The patient was positively identified in the preoperative holding area, then was brought to the operating room and placed in the flat supine position. General endotracheal anesthesia was obtained using a laser safe endotracheal tube which was secured to the left lower lip. The eyes were protected with moist sponges. The teeth were protected using a reinforced mouthguard. A final timeout was performed for verification purposes. The 4 Zeitels laryngoscope was inserted into the oral cavity and was utilized to expose the larynx. The patient was suspended from the gallows. Magnified laryngeal endoscopy was carried out using a 0 degree White lyric telescope connected to a video monitor. Findings were as noted above. Photodocumentation was obtained.  The vocal fold retractor was placed to improve distension of the larynx so as to facilitate two instrument by manual surgery.  Next, attention was turned to performing surgical intervention as indicated. The operating microscope was brought into the field, and the remainder of the case was performed under maximal magnification.  A micro laryngeal up cup forceps was used to gently grasp the free edge of the hemorrhagic left vocal fold lesion.  It was divided from its junction with the adjacent vocal fold epithelium using curved micro laryngeal scissors.  An epinephrine-soaked pledget was placed for hemostasis.  The specimen was passed off for permanent analysis.  All operating room personnel were equipped with laser safe eyewear. The FiO2 was maintained at less than 30%. All exposed regions of the patient's skin and face were protected with moist towels.  The 0.4 mm KTP laser fiber was loaded onto its straight handpiece.  The laser was  set at 20 w, 15 milliseconds, 2 pps.  The laser was used in a noncontact mode to gently photo ablate the base from which the hemorrhagic polyp was excised.  The endpoint of treatment was a nearly linear free edge.  Thereafter attention was turned to the contralateral vocal fold.  Surgical exploration indicated a deep sulcus deformity and, immediately to it, a fibrous mass.  In a similar fashion, the KTP laser was used a noncontact mode to reduce the convexity of the fibrous mass.  The treated epithelium and fibrous tissue was carefully debrided with curved angle ear instruments.  The endpoint of treatment was a nearly linear contour of the vocal fold, nevertheless there was some nonspecific persistent convexity in the mid membranous region bilaterally.  With this, the procedure was brought to completion. The larynx was topically anesthetized with 3 mL of 4% lidocaine. All equipment was removed. The patient was turned back over to the anesthesiology team for awakening and extubation, which were uneventful. The patient was escorted to the recovery room in good condition. The patient tolerated the procedure well without complications. All needle, sponge, and instrument counts were correct at the completion of the case.    ATTESTATION:  As the attending of record, I was present and participated in all portions of this procedure.

## 2022-08-18 NOTE — PLAN OF CARE
"Discharge instructions reviewed with pt and friend at bedside. Understanding verbalized. No complaints of pain reported. States throat is "sore" prior to discharge. Bedside delivery of medications occurred. Pt able to tolerate po intake, ambulate, and urinate in restroom. To be transported to car by escort.   "

## 2022-08-18 NOTE — ANESTHESIA PROCEDURE NOTES
Intubation    Date/Time: 8/18/2022 9:15 AM  Performed by: Corinna Mcmahan MD  Authorized by: Corinna Mcmahan MD     Intubation:     Induction:  Intravenous    Intubated:  Postinduction    Mask assessment prior to intubation: moderately difficult 2/2 facial hair.    Attempts:  1    Attempted By:  Resident anesthesiologist    Method of Intubation:  Direct    Blade:  Gupta 3    Laryngeal View Grade: Grade I - full view of cords      Difficult Airway Encountered?: No      Complications:  None    Airway Device:  Oral endotracheal tube and laser tube    Airway Device Size:  5.5    Style/Cuff Inflation:  Cuffed (inflated to minimal occlusive pressure)    Placement Verified By:  Capnometry    Complicating Factors:  None    Findings Post-Intubation:  BS equal bilateral

## 2022-08-18 NOTE — BRIEF OP NOTE
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]    Procedure(s) (LRB):  Suspension microlaryngoscopy with KTP laser assisted excision of lesion (N/A)    Anesthesia: General    Operative Findings: See full op note    Difficult exposure with size 4 Zeitels and cricoid pressure due to redundant supraglottic tissue/saccular cyst  Left anterior 1/3 hemorrhagic polyp removed sharply with photoablation of the base  Right adjacent sessile fibrous lesion, removed with ktp photoablation    Estimated Blood Loss: * No values recorded between 8/18/2022  9:35 AM and 8/18/2022 10:25 AM *         Specimens:   Specimen (24h ago, onward)             Start     Ordered    08/18/22 1019  Specimen to Pathology, Surgery ENT  Once        Comments: Pre-op Diagnosis: Dysphonia [R49.0]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 SHARATH LAGUNA    Release to patient Immediate        08/18/22 1020                  Discharge Note    OUTCOME: Patient tolerated treatment/procedure well without complication and is now ready for discharge.    DISPOSITION: Home or Self Care    FINAL DIAGNOSIS:  Phonotraumatic lesion of the glottis    FOLLOWUP: In clinic    DISCHARGE INSTRUCTIONS:    Discharge Procedure Orders   Diet Adult Regular     No dressing needed     Activity as tolerated   Order Comments: Voice rest for 1 week, avoid  coughing if possible

## 2022-08-18 NOTE — BRIEF OP NOTE
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    DISPOSITION: Home or Self Care    FINAL DIAGNOSIS:  Vocal Fold Dysplasia    FOLLOWUP: In clinic    DISCHARGE INSTRUCTIONS:    Discharge Procedure Orders   Diet Adult Regular     No dressing needed     Activity as tolerated   Order Comments: Voice rest for 1 week, avoid coughing if possible

## 2022-08-18 NOTE — TRANSFER OF CARE
"Anesthesia Transfer of Care Note    Patient: Shelly Gannon Jr.    Procedure(s) Performed: Procedure(s) (LRB):  Suspension microlaryngoscopy with KTP laser assisted excision of lesion (N/A)    Patient location: PACU    Anesthesia Type: general    Transport from OR: Transported from OR on 6-10 L/min O2 by face mask with adequate spontaneous ventilation    Post pain: adequate analgesia    Post assessment: no apparent anesthetic complications    Post vital signs: stable    Level of consciousness: awake    Nausea/Vomiting: no nausea/vomiting    Complications: none    Transfer of care protocol was followed      Last vitals:   Visit Vitals  /85   Pulse 92   Temp 36.3 °C (97.3 °F) (Skin)   Resp 12   Ht 5' 11.5" (1.816 m)   Wt 107.5 kg (237 lb)   SpO2 97%   BMI 32.59 kg/m²     "

## 2022-08-23 ENCOUNTER — OFFICE VISIT (OUTPATIENT)
Dept: OTOLARYNGOLOGY | Facility: CLINIC | Age: 47
End: 2022-08-23
Payer: MEDICAID

## 2022-08-23 VITALS — DIASTOLIC BLOOD PRESSURE: 98 MMHG | HEART RATE: 68 BPM | SYSTOLIC BLOOD PRESSURE: 144 MMHG

## 2022-08-23 DIAGNOSIS — R49.0 DYSPHONIA: ICD-10-CM

## 2022-08-23 DIAGNOSIS — D14.1 LARYNX NEOPLASM BENIGN: Primary | ICD-10-CM

## 2022-08-23 PROCEDURE — 3080F DIAST BP >= 90 MM HG: CPT | Mod: CPTII,,, | Performed by: OTOLARYNGOLOGY

## 2022-08-23 PROCEDURE — 3080F PR MOST RECENT DIASTOLIC BLOOD PRESSURE >= 90 MM HG: ICD-10-PCS | Mod: CPTII,,, | Performed by: OTOLARYNGOLOGY

## 2022-08-23 PROCEDURE — 3077F SYST BP >= 140 MM HG: CPT | Mod: CPTII,,, | Performed by: OTOLARYNGOLOGY

## 2022-08-23 PROCEDURE — 99999 PR PBB SHADOW E&M-EST. PATIENT-LVL III: ICD-10-PCS | Mod: PBBFAC,,, | Performed by: OTOLARYNGOLOGY

## 2022-08-23 PROCEDURE — 99999 PR PBB SHADOW E&M-EST. PATIENT-LVL III: CPT | Mod: PBBFAC,,, | Performed by: OTOLARYNGOLOGY

## 2022-08-23 PROCEDURE — 31579 LARYNGOSCOPY TELESCOPIC: CPT | Mod: S$PBB,,, | Performed by: OTOLARYNGOLOGY

## 2022-08-23 PROCEDURE — 1159F PR MEDICATION LIST DOCUMENTED IN MEDICAL RECORD: ICD-10-PCS | Mod: CPTII,,, | Performed by: OTOLARYNGOLOGY

## 2022-08-23 PROCEDURE — 1160F RVW MEDS BY RX/DR IN RCRD: CPT | Mod: CPTII,,, | Performed by: OTOLARYNGOLOGY

## 2022-08-23 PROCEDURE — 31579 PR LARYNGOSCOPY, FLEX/RIGID TELESCOPIC, W/STROBOSCOPY: ICD-10-PCS | Mod: S$PBB,,, | Performed by: OTOLARYNGOLOGY

## 2022-08-23 PROCEDURE — 4010F PR ACE/ARB THEARPY RXD/TAKEN: ICD-10-PCS | Mod: CPTII,,, | Performed by: OTOLARYNGOLOGY

## 2022-08-23 PROCEDURE — 1159F MED LIST DOCD IN RCRD: CPT | Mod: CPTII,,, | Performed by: OTOLARYNGOLOGY

## 2022-08-23 PROCEDURE — 31579 LARYNGOSCOPY TELESCOPIC: CPT | Mod: PBBFAC | Performed by: OTOLARYNGOLOGY

## 2022-08-23 PROCEDURE — 99213 PR OFFICE/OUTPT VISIT, EST, LEVL III, 20-29 MIN: ICD-10-PCS | Mod: 25,S$PBB,, | Performed by: OTOLARYNGOLOGY

## 2022-08-23 PROCEDURE — 1160F PR REVIEW ALL MEDS BY PRESCRIBER/CLIN PHARMACIST DOCUMENTED: ICD-10-PCS | Mod: CPTII,,, | Performed by: OTOLARYNGOLOGY

## 2022-08-23 PROCEDURE — 99213 OFFICE O/P EST LOW 20 MIN: CPT | Mod: 25,S$PBB,, | Performed by: OTOLARYNGOLOGY

## 2022-08-23 PROCEDURE — 3077F PR MOST RECENT SYSTOLIC BLOOD PRESSURE >= 140 MM HG: ICD-10-PCS | Mod: CPTII,,, | Performed by: OTOLARYNGOLOGY

## 2022-08-23 PROCEDURE — 4010F ACE/ARB THERAPY RXD/TAKEN: CPT | Mod: CPTII,,, | Performed by: OTOLARYNGOLOGY

## 2022-08-23 PROCEDURE — 99213 OFFICE O/P EST LOW 20 MIN: CPT | Mod: PBBFAC,25 | Performed by: OTOLARYNGOLOGY

## 2022-08-23 NOTE — PROGRESS NOTES
[facility]  Department of Otorhinolaryngology and Communication Sciences    Subjective:      Shelly Gannon Jr. is a 47 y.o. male who presents for follow-up.    He underwent microlaryngeal KTP laser treatment of bilateral phonotraumatic lesions last week.  He has been fairly adherent voice rest.  He has some ongoing dysgeusia and soreness of his tongue.  Since he has begun using his voice this week, he reports his voice is very near normal, and better than has been in a long time.  He is pleased with his progress and with his current status.  Pathology report is still pending.    The patient's medications, allergies, past medical, surgical, social and family histories were reviewed and updated as appropriate.    A detailed review of systems was obtained with pertinent positives as per the above HPI, and otherwise negative.      Objective:     BP (!) 144/98   Pulse 68      Constitutional: comfortable, well dressed  Psychiatric: appropriate affect  Respiratory: comfortably breathing, symmetric chest rise, no stridor  Voice: Marked interval improvements across all parameters.  Normal for age and gender.  Head: normocephalic  Eyes: conjunctivae and sclerae clear  Indirect laryngoscopy is limited due to gag    Procedure  Flexible Laryngeal Videostroboscopy (35700): Laryngeal videostroboscopy is indicated to assess laryngeal vibratory biomechanics and vocal fold oscillation, which cannot be assessed with a plain light examination. This was carried out transnasally with a distal chip videoendoscope. After verbal consent was obtained, the patient was positioned and the nose was topically decongested with 1% phenylephrine and topically anesthetized with 4% lidocaine. The endoscope was passed through the most patent nasal cavity and positioned to image the nasopharynx, larynx, and hypopharynx in detail. The following features were examined: nasopharyngeal, laryngeal, hypopharyngeal masses; velopharyngeal  strength, closure, and symmetry of motion; vocal fold range and symmetry of motion; laryngeal mucosal edema, erythema, inflammation, and hydration; salivary pooling; and gross laryngeal sensation. During phonation, the vocal folds were assessed for glottal closure; mucosal wave; vocal fold lesions; vibratory periodicity, amplitude, and phase symmetry; and vertical height match. The equipment was removed. The patient tolerated the procedure well without complication. All findings were normal except:  - linear free edges of bilateral true vocal folds  - faint superficial glottic edema bilaterally  - slightly premature contact, complete closure, pliability intact and symmetric  - mild inconsistent supraglottic hyperfunction      Assessment:     Shelly Gannon Jr. is a 47 y.o. male who was doing well 1 week following micro laryngeal KTP laser treatment of bilateral phono traumatic vocal fold lesions.     Plan:     Reassurance was provided. SLP voice evaluation and subsequent therapy sessions are medically necessary for restoration of laryngeal function. We will arrange for this to occur with my colleague Anselmo LU.     He will follow up with me in the future on an as-needed basis.    All questions were answered, and the patient is in agreement with the plan.    José Wiggins M.D.  [facility]  Department of Otorhinolaryngology and Communication Sciences

## 2022-08-24 LAB
FINAL PATHOLOGIC DIAGNOSIS: NORMAL
Lab: NORMAL

## 2022-10-05 ENCOUNTER — PATIENT OUTREACH (OUTPATIENT)
Dept: ADMINISTRATIVE | Facility: HOSPITAL | Age: 47
End: 2022-10-05
Payer: MEDICAID

## 2022-11-24 ENCOUNTER — HOSPITAL ENCOUNTER (EMERGENCY)
Facility: HOSPITAL | Age: 47
Discharge: HOME | End: 2022-11-25
Attending: EMERGENCY MEDICINE
Payer: MEDICAID

## 2022-11-24 DIAGNOSIS — M25.522 LEFT ELBOW PAIN: Primary | ICD-10-CM
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
## 2022-11-24 PROCEDURE — 99283 EMERGENCY DEPT VISIT LOW MDM: CPT

## 2022-11-25 VITALS
RESPIRATION RATE: 18 BRPM | TEMPERATURE: 98 F | DIASTOLIC BLOOD PRESSURE: 100 MMHG | HEART RATE: 97 BPM | WEIGHT: 230 LBS | SYSTOLIC BLOOD PRESSURE: 145 MMHG | OXYGEN SATURATION: 96 % | BODY MASS INDEX: 31.63 KG/M2

## 2022-11-25 PROCEDURE — 25000003 PHARM REV CODE 250: Performed by: EMERGENCY MEDICINE

## 2022-11-25 RX ORDER — HYDROCODONE BITARTRATE AND ACETAMINOPHEN 10; 325 MG/1; MG/1
1 TABLET ORAL EVERY 6 HOURS PRN
Qty: 12 TABLET | Refills: 0 | Status: SHIPPED | OUTPATIENT
Start: 2022-11-25 | End: 2022-11-28

## 2022-11-25 RX ORDER — HYDROCODONE BITARTRATE AND ACETAMINOPHEN 10; 325 MG/1; MG/1
1 TABLET ORAL
Status: COMPLETED | OUTPATIENT
Start: 2022-11-25 | End: 2022-11-25

## 2022-11-25 RX ADMIN — HYDROCODONE BITARTRATE AND ACETAMINOPHEN 1 TABLET: 10; 325 TABLET ORAL at 12:11
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
## 2022-12-09 ENCOUNTER — TELEPHONE (OUTPATIENT)
Dept: FAMILY MEDICINE | Facility: CLINIC | Age: 47
End: 2022-12-09
Payer: MEDICAID
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
## 2022-12-16 ENCOUNTER — OFFICE VISIT (OUTPATIENT)
Dept: FAMILY MEDICINE | Facility: CLINIC | Age: 47
End: 2022-12-16
Payer: MEDICAID

## 2022-12-16 VITALS
OXYGEN SATURATION: 97 % | BODY MASS INDEX: 32.79 KG/M2 | SYSTOLIC BLOOD PRESSURE: 120 MMHG | HEART RATE: 55 BPM | HEIGHT: 72 IN | DIASTOLIC BLOOD PRESSURE: 90 MMHG | WEIGHT: 242.06 LBS | TEMPERATURE: 98 F

## 2022-12-16 DIAGNOSIS — M25.522 LEFT ELBOW PAIN: Primary | ICD-10-CM

## 2022-12-16 PROCEDURE — 99999 PR PBB SHADOW E&M-EST. PATIENT-LVL V: CPT | Mod: PBBFAC,,, | Performed by: NURSE PRACTITIONER

## 2022-12-16 PROCEDURE — 3080F PR MOST RECENT DIASTOLIC BLOOD PRESSURE >= 90 MM HG: ICD-10-PCS | Mod: CPTII,,, | Performed by: NURSE PRACTITIONER

## 2022-12-16 PROCEDURE — 99999 PR PBB SHADOW E&M-EST. PATIENT-LVL V: ICD-10-PCS | Mod: PBBFAC,,, | Performed by: NURSE PRACTITIONER

## 2022-12-16 PROCEDURE — 1159F MED LIST DOCD IN RCRD: CPT | Mod: CPTII,,, | Performed by: NURSE PRACTITIONER

## 2022-12-16 PROCEDURE — 99213 OFFICE O/P EST LOW 20 MIN: CPT | Mod: S$PBB,,, | Performed by: NURSE PRACTITIONER

## 2022-12-16 PROCEDURE — 4010F PR ACE/ARB THEARPY RXD/TAKEN: ICD-10-PCS | Mod: CPTII,,, | Performed by: NURSE PRACTITIONER

## 2022-12-16 PROCEDURE — 3008F BODY MASS INDEX DOCD: CPT | Mod: CPTII,,, | Performed by: NURSE PRACTITIONER

## 2022-12-16 PROCEDURE — 1160F RVW MEDS BY RX/DR IN RCRD: CPT | Mod: CPTII,,, | Performed by: NURSE PRACTITIONER

## 2022-12-16 PROCEDURE — 1160F PR REVIEW ALL MEDS BY PRESCRIBER/CLIN PHARMACIST DOCUMENTED: ICD-10-PCS | Mod: CPTII,,, | Performed by: NURSE PRACTITIONER

## 2022-12-16 PROCEDURE — 3074F SYST BP LT 130 MM HG: CPT | Mod: CPTII,,, | Performed by: NURSE PRACTITIONER

## 2022-12-16 PROCEDURE — 1159F PR MEDICATION LIST DOCUMENTED IN MEDICAL RECORD: ICD-10-PCS | Mod: CPTII,,, | Performed by: NURSE PRACTITIONER

## 2022-12-16 PROCEDURE — 3008F PR BODY MASS INDEX (BMI) DOCUMENTED: ICD-10-PCS | Mod: CPTII,,, | Performed by: NURSE PRACTITIONER

## 2022-12-16 PROCEDURE — 99213 PR OFFICE/OUTPT VISIT, EST, LEVL III, 20-29 MIN: ICD-10-PCS | Mod: S$PBB,,, | Performed by: NURSE PRACTITIONER

## 2022-12-16 PROCEDURE — 3074F PR MOST RECENT SYSTOLIC BLOOD PRESSURE < 130 MM HG: ICD-10-PCS | Mod: CPTII,,, | Performed by: NURSE PRACTITIONER

## 2022-12-16 PROCEDURE — 3080F DIAST BP >= 90 MM HG: CPT | Mod: CPTII,,, | Performed by: NURSE PRACTITIONER

## 2022-12-16 PROCEDURE — 99215 OFFICE O/P EST HI 40 MIN: CPT | Mod: PBBFAC,PO | Performed by: NURSE PRACTITIONER

## 2022-12-16 PROCEDURE — 4010F ACE/ARB THERAPY RXD/TAKEN: CPT | Mod: CPTII,,, | Performed by: NURSE PRACTITIONER

## 2022-12-16 RX ORDER — MELOXICAM 7.5 MG/1
7.5 TABLET ORAL DAILY PRN
Qty: 30 TABLET | Refills: 0 | Status: SHIPPED | OUTPATIENT
Start: 2022-12-16 | End: 2023-03-17 | Stop reason: ALTCHOICE
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
## 2023-01-30 ENCOUNTER — TELEPHONE (OUTPATIENT)
Dept: ORTHOPEDICS | Facility: CLINIC | Age: 48
End: 2023-01-30
Payer: MEDICAID

## 2023-01-30 ENCOUNTER — TELEPHONE (OUTPATIENT)
Dept: FAMILY MEDICINE | Facility: CLINIC | Age: 48
End: 2023-01-30
Payer: MEDICAID

## 2023-01-30 NOTE — TELEPHONE ENCOUNTER
----- Message from Sheng Quan sent at 1/30/2023  2:44 PM CST -----  Contact: Patient  Type:  Patient Call          Who Called: Patient         Does the patient know what this is regarding?: Requesting a call back to have a appt scheduled ;pt said that he's in lots of pain and need to have a appt scheduled ; please advise           Would the patient rather a call back or a response via [facility]? Call           Best Call Back Number: [phone]             Additional Information:

## 2023-01-30 NOTE — TELEPHONE ENCOUNTER
----- Message from Sheng Quan sent at 1/30/2023  2:39 PM CST -----  Contact: Patient  Type:  Patient Call          Who Called: Patient         Does the patient know what this is regarding?: Requesting a call back to have a referral for orthopedic  pt said that he's having arm pain ; please advise           Would the patient rather a call back or a response via [facility]? Call           Best Call Back Number: [phone]             Additional Information:

## 2023-01-30 NOTE — TELEPHONE ENCOUNTER
----- Message from Sheng Quan sent at 1/30/2023  2:39 PM CST -----  Contact: Patient  Type:  Patient Call          Who Called: Patient         Does the patient know what this is regarding?: Requesting a call back to have a referral for orthopedic  pt said that he's having arm pain ; please advise           Would the patient rather a call back or a response via [facility]? Call           Best Call Back Number: [phone]             Additional Information:

## 2023-02-09 ENCOUNTER — TELEPHONE (OUTPATIENT)
Dept: ORTHOPEDICS | Facility: CLINIC | Age: 48
End: 2023-02-09
Payer: MEDICAID

## 2023-02-09 NOTE — TELEPHONE ENCOUNTER
Patients wife r/s to 2/22/23.    ----- Message from Eddie Patel sent at 2/9/2023 10:33 AM CST -----  Regarding: medicaid pt wants to re gianna, I can't, call wife Jane   Contact: wife Jane Craig   medicaid pt wants to re gianna, I can't, call wife Jane Craig

## 2023-02-22 ENCOUNTER — OFFICE VISIT (OUTPATIENT)
Dept: ORTHOPEDICS | Facility: CLINIC | Age: 48
End: 2023-02-22
Payer: MEDICAID

## 2023-02-22 VITALS — HEIGHT: 71 IN | BODY MASS INDEX: 33.88 KG/M2 | RESPIRATION RATE: 16 BRPM | WEIGHT: 242 LBS

## 2023-02-22 DIAGNOSIS — S46.312A TRICEPS TENDON RUPTURE, LEFT, INITIAL ENCOUNTER: ICD-10-CM
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
## 2023-02-22 DIAGNOSIS — M25.522 LEFT ELBOW PAIN: Primary | ICD-10-CM

## 2023-02-22 PROCEDURE — 1159F MED LIST DOCD IN RCRD: CPT | Mod: CPTII,,, | Performed by: ORTHOPAEDIC SURGERY

## 2023-02-22 PROCEDURE — 3008F BODY MASS INDEX DOCD: CPT | Mod: CPTII,,, | Performed by: ORTHOPAEDIC SURGERY

## 2023-02-22 PROCEDURE — 99999 PR PBB SHADOW E&M-EST. PATIENT-LVL III: ICD-10-PCS | Mod: PBBFAC,,, | Performed by: ORTHOPAEDIC SURGERY

## 2023-02-22 PROCEDURE — 99999 PR PBB SHADOW E&M-EST. PATIENT-LVL III: CPT | Mod: PBBFAC,,, | Performed by: ORTHOPAEDIC SURGERY

## 2023-02-22 PROCEDURE — 3008F PR BODY MASS INDEX (BMI) DOCUMENTED: ICD-10-PCS | Mod: CPTII,,, | Performed by: ORTHOPAEDIC SURGERY

## 2023-02-22 PROCEDURE — 1159F PR MEDICATION LIST DOCUMENTED IN MEDICAL RECORD: ICD-10-PCS | Mod: CPTII,,, | Performed by: ORTHOPAEDIC SURGERY

## 2023-02-22 PROCEDURE — 99203 OFFICE O/P NEW LOW 30 MIN: CPT | Mod: S$PBB,,, | Performed by: ORTHOPAEDIC SURGERY

## 2023-02-22 PROCEDURE — 99203 PR OFFICE/OUTPT VISIT, NEW, LEVL III, 30-44 MIN: ICD-10-PCS | Mod: S$PBB,,, | Performed by: ORTHOPAEDIC SURGERY

## 2023-02-22 PROCEDURE — 99213 OFFICE O/P EST LOW 20 MIN: CPT | Mod: PBBFAC,PN | Performed by: ORTHOPAEDIC SURGERY

## 2023-02-22 RX ORDER — IBUPROFEN 600 MG/1
600 TABLET ORAL 3 TIMES DAILY
Qty: 90 TABLET | Refills: 0 | Status: ON HOLD | OUTPATIENT
Start: 2023-02-22 | End: 2023-03-23 | Stop reason: HOSPADM

## 2023-02-22 RX ORDER — OMEPRAZOLE 20 MG/1
20 CAPSULE, DELAYED RELEASE ORAL
COMMUNITY
Start: 2023-02-14 | End: 2023-03-17 | Stop reason: ALTCHOICE

## 2023-02-22 RX ORDER — CYCLOBENZAPRINE HCL 10 MG
10 TABLET ORAL 3 TIMES DAILY PRN
Qty: 42 TABLET | Refills: 0 | Status: SHIPPED | OUTPATIENT
Start: 2023-02-22 | End: 2023-03-04
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
## 2023-02-24 ENCOUNTER — TELEPHONE (OUTPATIENT)
Dept: ORTHOPEDICS | Facility: CLINIC | Age: 48
End: 2023-02-24
Payer: MEDICAID

## 2023-02-24 NOTE — TELEPHONE ENCOUNTER
Returned call. Pt's spouse states MRI order was supposed to be faxed a couple days ago, and states [facility] did not receive anything from our office. Order faxed from the system, and can be viewed under misc reports > Chart Review >  routing history. Pt will call back if she has any issues.

## 2023-02-24 NOTE — TELEPHONE ENCOUNTER
----- Message from Eddie Patel sent at 2/24/2023 12:34 PM CST -----  Regarding: needs different MRI orders, call wife Jane Craig   Contact: wife Jane Craig   needs different MRI orders, call david Craig [phone]

## 2023-02-28 ENCOUNTER — TELEPHONE (OUTPATIENT)
Dept: ORTHOPEDICS | Facility: CLINIC | Age: 48
End: 2023-02-28
Payer: MEDICAID

## 2023-02-28 DIAGNOSIS — M25.521 RIGHT ELBOW PAIN: Primary | ICD-10-CM

## 2023-02-28 DIAGNOSIS — S46.312A TRICEPS TENDON RUPTURE, LEFT, INITIAL ENCOUNTER: ICD-10-CM

## 2023-02-28 NOTE — TELEPHONE ENCOUNTER
----- Message from Porsche Coronel sent at 2/28/2023 10:11 AM CST -----  Who Called: [facility]     What is the request in detail: Requesting call back to discuss MRI order for left elbow, orders sent over were not signed, possibly a page missing. Please advise.     Can the clinic reply by [facility]? No    Best Call Back Number: [phone]    Additional Information:

## 2023-03-03 ENCOUNTER — HOSPITAL ENCOUNTER (EMERGENCY)
Facility: HOSPITAL | Age: 48
Discharge: HOME | End: 2023-03-04
Attending: STUDENT IN AN ORGANIZED HEALTH CARE EDUCATION/TRAINING PROGRAM
Payer: MEDICAID

## 2023-03-03 DIAGNOSIS — M25.522 ELBOW PAIN, CHRONIC, LEFT: ICD-10-CM

## 2023-03-03 DIAGNOSIS — G89.29 ELBOW PAIN, CHRONIC, LEFT: ICD-10-CM

## 2023-03-03 DIAGNOSIS — R07.9 CHEST PAIN: Primary | ICD-10-CM

## 2023-03-03 DIAGNOSIS — M79.622 LEFT AXILLARY PAIN: ICD-10-CM

## 2023-03-03 LAB
ALBUMIN SERPL BCP-MCNC: 4.3 G/DL (ref 3.5–5.2)
ALP SERPL-CCNC: 45 U/L (ref 55–135)
ALT SERPL W/O P-5'-P-CCNC: 18 U/L (ref 10–44)
ANION GAP SERPL CALC-SCNC: 9 MMOL/L (ref 8–16)
AST SERPL-CCNC: 20 U/L (ref 10–40)
BASOPHILS # BLD AUTO: 0.05 K/UL (ref 0–0.2)
BASOPHILS NFR BLD: 1.2 % (ref 0–1.9)
BILIRUB SERPL-MCNC: 0.6 MG/DL (ref 0.1–1)
BNP SERPL-MCNC: 12 PG/ML (ref 0–99)
BUN SERPL-MCNC: 17 MG/DL (ref 6–20)
CALCIUM SERPL-MCNC: 9 MG/DL (ref 8.7–10.5)
CHLORIDE SERPL-SCNC: 105 MMOL/L (ref 95–110)
CO2 SERPL-SCNC: 25 MMOL/L (ref 23–29)
CREAT SERPL-MCNC: 1.2 MG/DL (ref 0.5–1.4)
DIFFERENTIAL METHOD: NORMAL
EOSINOPHIL # BLD AUTO: 0.1 K/UL (ref 0–0.5)
EOSINOPHIL NFR BLD: 2.9 % (ref 0–8)
ERYTHROCYTE [DISTWIDTH] IN BLOOD BY AUTOMATED COUNT: 13.2 % (ref 11.5–14.5)
EST. GFR  (NO RACE VARIABLE): >60 ML/MIN/1.73 M^2
GLUCOSE SERPL-MCNC: 110 MG/DL (ref 70–110)
HCT VFR BLD AUTO: 44 % (ref 40–54)
HGB BLD-MCNC: 15 G/DL (ref 14–18)
IMM GRANULOCYTES # BLD AUTO: 0 K/UL (ref 0–0.04)
IMM GRANULOCYTES NFR BLD AUTO: 0 % (ref 0–0.5)
INR PPP: 1 (ref 0.8–1.2)
LYMPHOCYTES # BLD AUTO: 1.5 K/UL (ref 1–4.8)
LYMPHOCYTES NFR BLD: 35.6 % (ref 18–48)
MCH RBC QN AUTO: 30.3 PG (ref 27–31)
MCHC RBC AUTO-ENTMCNC: 34.1 G/DL (ref 32–36)
MCV RBC AUTO: 89 FL (ref 82–98)
MONOCYTES # BLD AUTO: 0.4 K/UL (ref 0.3–1)
MONOCYTES NFR BLD: 8.5 % (ref 4–15)
NEUTROPHILS # BLD AUTO: 2.1 K/UL (ref 1.8–7.7)
NEUTROPHILS NFR BLD: 51.8 % (ref 38–73)
NRBC BLD-RTO: 0 /100 WBC
PLATELET # BLD AUTO: 179 K/UL (ref 150–450)
PMV BLD AUTO: 11.2 FL (ref 9.2–12.9)
POTASSIUM SERPL-SCNC: 3.4 MMOL/L (ref 3.5–5.1)
PROT SERPL-MCNC: 7.4 G/DL (ref 6–8.4)
PROTHROMBIN TIME: 10.5 SEC (ref 9–12.5)
RBC # BLD AUTO: 4.95 M/UL (ref 4.6–6.2)
SODIUM SERPL-SCNC: 139 MMOL/L (ref 136–145)
TROPONIN I SERPL HS-MCNC: 3.9 PG/ML (ref 0–14.9)
WBC # BLD AUTO: 4.1 K/UL (ref 3.9–12.7)

## 2023-03-03 PROCEDURE — 83880 ASSAY OF NATRIURETIC PEPTIDE: CPT | Performed by: NURSE PRACTITIONER

## 2023-03-03 PROCEDURE — 84484 ASSAY OF TROPONIN QUANT: CPT | Performed by: NURSE PRACTITIONER

## 2023-03-03 PROCEDURE — 36415 COLL VENOUS BLD VENIPUNCTURE: CPT | Performed by: NURSE PRACTITIONER

## 2023-03-03 PROCEDURE — 99284 EMERGENCY DEPT VISIT MOD MDM: CPT | Mod: 25

## 2023-03-03 PROCEDURE — 93005 ELECTROCARDIOGRAM TRACING: CPT | Performed by: INTERNAL MEDICINE

## 2023-03-03 PROCEDURE — 80053 COMPREHEN METABOLIC PANEL: CPT | Performed by: NURSE PRACTITIONER

## 2023-03-03 PROCEDURE — 85025 COMPLETE CBC W/AUTO DIFF WBC: CPT | Performed by: NURSE PRACTITIONER

## 2023-03-03 PROCEDURE — 93010 ELECTROCARDIOGRAM REPORT: CPT | Mod: ,,, | Performed by: INTERNAL MEDICINE

## 2023-03-03 PROCEDURE — 85610 PROTHROMBIN TIME: CPT | Performed by: NURSE PRACTITIONER

## 2023-03-03 PROCEDURE — 93010 EKG 12-LEAD: ICD-10-PCS | Mod: ,,, | Performed by: INTERNAL MEDICINE

## 2023-03-04 VITALS
RESPIRATION RATE: 22 BRPM | BODY MASS INDEX: 32.9 KG/M2 | DIASTOLIC BLOOD PRESSURE: 86 MMHG | HEART RATE: 54 BPM | SYSTOLIC BLOOD PRESSURE: 132 MMHG | OXYGEN SATURATION: 96 % | WEIGHT: 235 LBS | HEIGHT: 71 IN | TEMPERATURE: 98 F
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
## 2023-03-07 ENCOUNTER — OFFICE VISIT (OUTPATIENT)
Dept: ORTHOPEDICS | Facility: CLINIC | Age: 48
End: 2023-03-07
Payer: MEDICAID

## 2023-03-07 VITALS — HEIGHT: 71 IN | RESPIRATION RATE: 16 BRPM | WEIGHT: 235 LBS | BODY MASS INDEX: 32.9 KG/M2

## 2023-03-07 DIAGNOSIS — Z01.818 PRE-OP EXAM: ICD-10-CM

## 2023-03-07 DIAGNOSIS — S46.312A TRICEPS TENDON RUPTURE, LEFT, INITIAL ENCOUNTER: Primary | ICD-10-CM

## 2023-03-07 DIAGNOSIS — S46.312A: ICD-10-CM

## 2023-03-07 PROCEDURE — 3008F PR BODY MASS INDEX (BMI) DOCUMENTED: ICD-10-PCS | Mod: CPTII,,, | Performed by: ORTHOPAEDIC SURGERY

## 2023-03-07 PROCEDURE — 99215 PR OFFICE/OUTPT VISIT, EST, LEVL V, 40-54 MIN: ICD-10-PCS | Mod: S$PBB,,, | Performed by: ORTHOPAEDIC SURGERY

## 2023-03-07 PROCEDURE — 99999 PR PBB SHADOW E&M-EST. PATIENT-LVL II: CPT | Mod: PBBFAC,,, | Performed by: ORTHOPAEDIC SURGERY

## 2023-03-07 PROCEDURE — 3008F BODY MASS INDEX DOCD: CPT | Mod: CPTII,,, | Performed by: ORTHOPAEDIC SURGERY

## 2023-03-07 PROCEDURE — 99999 PR PBB SHADOW E&M-EST. PATIENT-LVL II: ICD-10-PCS | Mod: PBBFAC,,, | Performed by: ORTHOPAEDIC SURGERY

## 2023-03-07 PROCEDURE — 99212 OFFICE O/P EST SF 10 MIN: CPT | Mod: PBBFAC,PN | Performed by: ORTHOPAEDIC SURGERY

## 2023-03-07 PROCEDURE — 99215 OFFICE O/P EST HI 40 MIN: CPT | Mod: S$PBB,,, | Performed by: ORTHOPAEDIC SURGERY

## 2023-03-07 RX ORDER — ACETAMINOPHEN AND CODEINE PHOSPHATE 300; 30 MG/1; MG/1
1 TABLET ORAL EVERY 6 HOURS PRN
Qty: 28 TABLET | Refills: 0 | Status: SHIPPED | OUTPATIENT
Start: 2023-03-07 | End: 2023-03-17

## 2023-03-07 RX ORDER — MUPIROCIN 20 MG/G
OINTMENT TOPICAL
Status: CANCELLED | OUTPATIENT
Start: 2023-03-07
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
## 2023-03-21 ENCOUNTER — ANESTHESIA EVENT (OUTPATIENT)
Dept: SURGERY | Facility: HOSPITAL | Age: 48
End: 2023-03-21
Payer: MEDICAID

## 2023-03-22 DIAGNOSIS — S46.312A TRICEPS TENDON RUPTURE, LEFT, INITIAL ENCOUNTER: Primary | ICD-10-CM

## 2023-03-22 RX ORDER — ONDANSETRON 4 MG/1
4 TABLET, ORALLY DISINTEGRATING ORAL EVERY 8 HOURS PRN
Qty: 30 TABLET | Refills: 0 | Status: SHIPPED | OUTPATIENT
Start: 2023-03-22

## 2023-03-22 RX ORDER — IBUPROFEN 600 MG/1
600 TABLET ORAL 3 TIMES DAILY
Qty: 90 TABLET | Refills: 0 | Status: SHIPPED | OUTPATIENT
Start: 2023-03-22

## 2023-03-22 RX ORDER — OXYCODONE AND ACETAMINOPHEN 5; 325 MG/1; MG/1
1 TABLET ORAL EVERY 6 HOURS PRN
Qty: 28 TABLET | Refills: 0 | Status: SHIPPED | OUTPATIENT
Start: 2023-03-22 | End: 2023-03-24

## 2023-03-23 ENCOUNTER — ANESTHESIA (OUTPATIENT)
Dept: SURGERY | Facility: HOSPITAL | Age: 48
End: 2023-03-23
Payer: MEDICAID

## 2023-03-23 ENCOUNTER — HOSPITAL ENCOUNTER (OUTPATIENT)
Facility: HOSPITAL | Age: 48
Discharge: HOME | End: 2023-03-23
Attending: ORTHOPAEDIC SURGERY | Admitting: ORTHOPAEDIC SURGERY
Payer: MEDICAID

## 2023-03-23 DIAGNOSIS — S46.312A TRICEPS TENDON RUPTURE, LEFT, INITIAL ENCOUNTER: ICD-10-CM

## 2023-03-23 DIAGNOSIS — S46.312A: ICD-10-CM

## 2023-03-23 DIAGNOSIS — Z01.818 PRE-OP EXAM: ICD-10-CM

## 2023-03-23 PROCEDURE — 24342 PR REINSERT BI/TRICEPS TENDON,DISTAL: ICD-10-PCS | Mod: LT,,, | Performed by: ORTHOPAEDIC SURGERY

## 2023-03-23 PROCEDURE — 94799 UNLISTED PULMONARY SVC/PX: CPT

## 2023-03-23 PROCEDURE — 63600175 PHARM REV CODE 636 W HCPCS: Performed by: ORTHOPAEDIC SURGERY

## 2023-03-23 PROCEDURE — D9220A PRA ANESTHESIA: ICD-10-PCS | Mod: CRNA,,, | Performed by: NURSE ANESTHETIST, CERTIFIED REGISTERED

## 2023-03-23 PROCEDURE — D9220A PRA ANESTHESIA: Mod: ANES,,, | Performed by: ANESTHESIOLOGY

## 2023-03-23 PROCEDURE — 71000015 HC POSTOP RECOV 1ST HR: Performed by: ORTHOPAEDIC SURGERY

## 2023-03-23 PROCEDURE — 63600175 PHARM REV CODE 636 W HCPCS: Performed by: NURSE ANESTHETIST, CERTIFIED REGISTERED

## 2023-03-23 PROCEDURE — 37000009 HC ANESTHESIA EA ADD 15 MINS: Performed by: ORTHOPAEDIC SURGERY

## 2023-03-23 PROCEDURE — 25000003 PHARM REV CODE 250: Performed by: ANESTHESIOLOGY

## 2023-03-23 PROCEDURE — 25000003 PHARM REV CODE 250: Performed by: NURSE ANESTHETIST, CERTIFIED REGISTERED

## 2023-03-23 PROCEDURE — 27201423 OPTIME MED/SURG SUP & DEVICES STERILE SUPPLY: Performed by: ORTHOPAEDIC SURGERY

## 2023-03-23 PROCEDURE — 99900104 DSU ONLY-NO CHARGE-EA ADD'L HR (STAT): Performed by: ORTHOPAEDIC SURGERY

## 2023-03-23 PROCEDURE — D9220A PRA ANESTHESIA: Mod: CRNA,,, | Performed by: NURSE ANESTHETIST, CERTIFIED REGISTERED

## 2023-03-23 PROCEDURE — 99900103 DSU ONLY-NO CHARGE-INITIAL HR (STAT): Performed by: ORTHOPAEDIC SURGERY

## 2023-03-23 PROCEDURE — 71000039 HC RECOVERY, EACH ADD'L HOUR: Performed by: ORTHOPAEDIC SURGERY

## 2023-03-23 PROCEDURE — 37000008 HC ANESTHESIA 1ST 15 MINUTES: Performed by: ORTHOPAEDIC SURGERY

## 2023-03-23 PROCEDURE — 36000709 HC OR TIME LEV III EA ADD 15 MIN: Performed by: ORTHOPAEDIC SURGERY

## 2023-03-23 PROCEDURE — 36000708 HC OR TIME LEV III 1ST 15 MIN: Performed by: ORTHOPAEDIC SURGERY

## 2023-03-23 PROCEDURE — C1713 ANCHOR/SCREW BN/BN,TIS/BN: HCPCS | Performed by: ORTHOPAEDIC SURGERY

## 2023-03-23 PROCEDURE — 25000003 PHARM REV CODE 250: Performed by: ORTHOPAEDIC SURGERY

## 2023-03-23 PROCEDURE — D9220A PRA ANESTHESIA: ICD-10-PCS | Mod: ANES,,, | Performed by: ANESTHESIOLOGY

## 2023-03-23 PROCEDURE — 24342 REPAIR OF RUPTURED TENDON: CPT | Mod: LT,,, | Performed by: ORTHOPAEDIC SURGERY

## 2023-03-23 PROCEDURE — 71000033 HC RECOVERY, INTIAL HOUR: Performed by: ORTHOPAEDIC SURGERY

## 2023-03-23 DEVICE — Y-KNOT PRO RC ANCHOR, THREE RIBBONS
Type: IMPLANTABLE DEVICE | Site: ARM | Status: FUNCTIONAL
Brand: Y-KNOT

## 2023-03-23 RX ORDER — FENTANYL CITRATE 50 UG/ML
25 INJECTION, SOLUTION INTRAMUSCULAR; INTRAVENOUS EVERY 5 MIN PRN
Status: DISCONTINUED | OUTPATIENT
Start: 2023-03-23 | End: 2023-03-23 | Stop reason: HOSPADM

## 2023-03-23 RX ORDER — HYDROMORPHONE HYDROCHLORIDE 2 MG/ML
0.2 INJECTION, SOLUTION INTRAMUSCULAR; INTRAVENOUS; SUBCUTANEOUS EVERY 5 MIN PRN
Status: DISCONTINUED | OUTPATIENT
Start: 2023-03-23 | End: 2023-03-23 | Stop reason: HOSPADM

## 2023-03-23 RX ORDER — SODIUM CHLORIDE 0.9 % (FLUSH) 0.9 %
3 SYRINGE (ML) INJECTION EVERY 8 HOURS
Status: DISCONTINUED | OUTPATIENT
Start: 2023-03-23 | End: 2023-03-23 | Stop reason: HOSPADM

## 2023-03-23 RX ORDER — ONDANSETRON HYDROCHLORIDE 2 MG/ML
INJECTION, SOLUTION INTRAMUSCULAR; INTRAVENOUS
Status: DISCONTINUED | OUTPATIENT
Start: 2023-03-23 | End: 2023-03-23

## 2023-03-23 RX ORDER — KETOROLAC TROMETHAMINE 30 MG/ML
INJECTION, SOLUTION INTRAMUSCULAR; INTRAVENOUS
Status: DISCONTINUED | OUTPATIENT
Start: 2023-03-23 | End: 2023-03-23

## 2023-03-23 RX ORDER — SODIUM CHLORIDE 0.9 % (FLUSH) 0.9 %
10 SYRINGE (ML) INJECTION
Status: DISCONTINUED | OUTPATIENT
Start: 2023-03-23 | End: 2023-03-23 | Stop reason: HOSPADM

## 2023-03-23 RX ORDER — MIDAZOLAM HYDROCHLORIDE 1 MG/ML
INJECTION INTRAMUSCULAR; INTRAVENOUS
Status: DISCONTINUED | OUTPATIENT
Start: 2023-03-23 | End: 2023-03-23

## 2023-03-23 RX ORDER — DEXAMETHASONE SODIUM PHOSPHATE 4 MG/ML
INJECTION, SOLUTION INTRA-ARTICULAR; INTRALESIONAL; INTRAMUSCULAR; INTRAVENOUS; SOFT TISSUE
Status: DISCONTINUED | OUTPATIENT
Start: 2023-03-23 | End: 2023-03-23

## 2023-03-23 RX ORDER — FENTANYL CITRATE 50 UG/ML
INJECTION, SOLUTION INTRAMUSCULAR; INTRAVENOUS
Status: DISCONTINUED | OUTPATIENT
Start: 2023-03-23 | End: 2023-03-23

## 2023-03-23 RX ORDER — MEPERIDINE HYDROCHLORIDE 50 MG/ML
12.5 INJECTION INTRAMUSCULAR; INTRAVENOUS; SUBCUTANEOUS ONCE AS NEEDED
Status: DISCONTINUED | OUTPATIENT
Start: 2023-03-23 | End: 2023-03-23 | Stop reason: HOSPADM

## 2023-03-23 RX ORDER — ONDANSETRON 2 MG/ML
4 INJECTION INTRAMUSCULAR; INTRAVENOUS ONCE AS NEEDED
Status: DISCONTINUED | OUTPATIENT
Start: 2023-03-23 | End: 2023-03-23 | Stop reason: HOSPADM

## 2023-03-23 RX ORDER — ACETAMINOPHEN 10 MG/ML
INJECTION, SOLUTION INTRAVENOUS
Status: DISCONTINUED | OUTPATIENT
Start: 2023-03-23 | End: 2023-03-23

## 2023-03-23 RX ORDER — MUPIROCIN 20 MG/G
OINTMENT TOPICAL
Status: DISCONTINUED | OUTPATIENT
Start: 2023-03-23 | End: 2023-03-23 | Stop reason: HOSPADM

## 2023-03-23 RX ORDER — LORAZEPAM 2 MG/ML
0.25 INJECTION INTRAMUSCULAR ONCE AS NEEDED
Status: DISCONTINUED | OUTPATIENT
Start: 2023-03-23 | End: 2023-03-23 | Stop reason: HOSPADM

## 2023-03-23 RX ORDER — DIPHENHYDRAMINE HYDROCHLORIDE 50 MG/ML
12.5 INJECTION INTRAMUSCULAR; INTRAVENOUS ONCE AS NEEDED
Status: DISCONTINUED | OUTPATIENT
Start: 2023-03-23 | End: 2023-03-23 | Stop reason: HOSPADM

## 2023-03-23 RX ORDER — PROPOFOL 10 MG/ML
VIAL (ML) INTRAVENOUS
Status: DISCONTINUED | OUTPATIENT
Start: 2023-03-23 | End: 2023-03-23

## 2023-03-23 RX ORDER — ROCURONIUM BROMIDE 10 MG/ML
INJECTION, SOLUTION INTRAVENOUS
Status: DISCONTINUED | OUTPATIENT
Start: 2023-03-23 | End: 2023-03-23

## 2023-03-23 RX ORDER — ONDANSETRON 4 MG/1
8 TABLET, ORALLY DISINTEGRATING ORAL EVERY 8 HOURS PRN
Status: CANCELLED | OUTPATIENT
Start: 2023-03-23

## 2023-03-23 RX ORDER — SODIUM CHLORIDE, SODIUM LACTATE, POTASSIUM CHLORIDE, CALCIUM CHLORIDE 600; 310; 30; 20 MG/100ML; MG/100ML; MG/100ML; MG/100ML
10 INJECTION, SOLUTION INTRAVENOUS CONTINUOUS
Status: DISCONTINUED | OUTPATIENT
Start: 2023-03-23 | End: 2023-03-23 | Stop reason: HOSPADM

## 2023-03-23 RX ORDER — PROCHLORPERAZINE EDISYLATE 5 MG/ML
5 INJECTION INTRAMUSCULAR; INTRAVENOUS EVERY 30 MIN PRN
Status: DISCONTINUED | OUTPATIENT
Start: 2023-03-23 | End: 2023-03-23 | Stop reason: HOSPADM

## 2023-03-23 RX ORDER — LIDOCAINE HYDROCHLORIDE 20 MG/ML
INJECTION INTRAVENOUS
Status: DISCONTINUED | OUTPATIENT
Start: 2023-03-23 | End: 2023-03-23

## 2023-03-23 RX ORDER — SODIUM CHLORIDE, SODIUM LACTATE, POTASSIUM CHLORIDE, CALCIUM CHLORIDE 600; 310; 30; 20 MG/100ML; MG/100ML; MG/100ML; MG/100ML
500 INJECTION, SOLUTION INTRAVENOUS ONCE
Status: DISCONTINUED | OUTPATIENT
Start: 2023-03-23 | End: 2023-03-23 | Stop reason: HOSPADM

## 2023-03-23 RX ORDER — CEFAZOLIN SODIUM 2 G/50ML
2 SOLUTION INTRAVENOUS
Status: COMPLETED | OUTPATIENT
Start: 2023-03-23 | End: 2023-03-23

## 2023-03-23 RX ORDER — SUCCINYLCHOLINE CHLORIDE 20 MG/ML
INJECTION INTRAMUSCULAR; INTRAVENOUS
Status: DISCONTINUED | OUTPATIENT
Start: 2023-03-23 | End: 2023-03-23

## 2023-03-23 RX ORDER — IBUPROFEN 600 MG/1
600 TABLET ORAL EVERY 6 HOURS PRN
Status: CANCELLED | OUTPATIENT
Start: 2023-03-23

## 2023-03-23 RX ORDER — OXYCODONE HYDROCHLORIDE 10 MG/1
10 TABLET ORAL EVERY 4 HOURS PRN
Status: CANCELLED | OUTPATIENT
Start: 2023-03-23

## 2023-03-23 RX ORDER — LIDOCAINE HYDROCHLORIDE 10 MG/ML
1 INJECTION, SOLUTION EPIDURAL; INFILTRATION; INTRACAUDAL; PERINEURAL ONCE
Status: DISCONTINUED | OUTPATIENT
Start: 2023-03-23 | End: 2023-03-23 | Stop reason: HOSPADM

## 2023-03-23 RX ORDER — DEXMEDETOMIDINE HYDROCHLORIDE 100 UG/ML
INJECTION, SOLUTION INTRAVENOUS
Status: DISCONTINUED | OUTPATIENT
Start: 2023-03-23 | End: 2023-03-23

## 2023-03-23 RX ORDER — PHENYLEPHRINE HYDROCHLORIDE 10 MG/ML
INJECTION INTRAVENOUS
Status: DISCONTINUED | OUTPATIENT
Start: 2023-03-23 | End: 2023-03-23

## 2023-03-23 RX ORDER — HYDROCODONE BITARTRATE AND ACETAMINOPHEN 5; 325 MG/1; MG/1
1 TABLET ORAL EVERY 4 HOURS PRN
Status: CANCELLED | OUTPATIENT
Start: 2023-03-23

## 2023-03-23 RX ORDER — OXYCODONE HYDROCHLORIDE 5 MG/1
5 TABLET ORAL ONCE AS NEEDED
Status: COMPLETED | OUTPATIENT
Start: 2023-03-23 | End: 2023-03-23

## 2023-03-23 RX ADMIN — CEFAZOLIN SODIUM 2 G: 2 SOLUTION INTRAVENOUS at 09:03

## 2023-03-23 RX ADMIN — LIDOCAINE HYDROCHLORIDE 100 MG: 20 INJECTION, SOLUTION INTRAVENOUS at 09:03

## 2023-03-23 RX ADMIN — PHENYLEPHRINE HYDROCHLORIDE 200 MCG: 10 INJECTION INTRAVENOUS at 10:03

## 2023-03-23 RX ADMIN — FENTANYL CITRATE 25 MCG: 50 INJECTION, SOLUTION INTRAMUSCULAR; INTRAVENOUS at 11:03

## 2023-03-23 RX ADMIN — DEXMEDETOMIDINE HYDROCHLORIDE 8 MCG: 100 INJECTION, SOLUTION INTRAVENOUS at 09:03

## 2023-03-23 RX ADMIN — PHENYLEPHRINE HYDROCHLORIDE 100 MCG: 10 INJECTION INTRAVENOUS at 10:03

## 2023-03-23 RX ADMIN — MIDAZOLAM HYDROCHLORIDE 2 MG: 1 INJECTION, SOLUTION INTRAMUSCULAR; INTRAVENOUS at 09:03

## 2023-03-23 RX ADMIN — ROCURONIUM BROMIDE 20 MG: 10 INJECTION, SOLUTION INTRAVENOUS at 09:03

## 2023-03-23 RX ADMIN — SODIUM CHLORIDE, SODIUM GLUCONATE, SODIUM ACETATE, POTASSIUM CHLORIDE AND MAGNESIUM CHLORIDE: 526; 502; 368; 37; 30 INJECTION, SOLUTION INTRAVENOUS at 10:03

## 2023-03-23 RX ADMIN — PHENYLEPHRINE HYDROCHLORIDE 150 MCG: 10 INJECTION INTRAVENOUS at 10:03

## 2023-03-23 RX ADMIN — ROCURONIUM BROMIDE 10 MG: 10 INJECTION, SOLUTION INTRAVENOUS at 09:03

## 2023-03-23 RX ADMIN — SODIUM CHLORIDE, SODIUM GLUCONATE, SODIUM ACETATE, POTASSIUM CHLORIDE AND MAGNESIUM CHLORIDE: 526; 502; 368; 37; 30 INJECTION, SOLUTION INTRAVENOUS at 08:03

## 2023-03-23 RX ADMIN — MUPIROCIN: 20 OINTMENT TOPICAL at 08:03

## 2023-03-23 RX ADMIN — OXYCODONE 5 MG: 5 TABLET ORAL at 12:03

## 2023-03-23 RX ADMIN — KETOROLAC TROMETHAMINE 30 MG: 30 INJECTION, SOLUTION INTRAMUSCULAR; INTRAVENOUS at 11:03

## 2023-03-23 RX ADMIN — GLYCOPYRROLATE 0.2 MG: 0.2 INJECTION, SOLUTION INTRAMUSCULAR; INTRAVITREAL at 09:03

## 2023-03-23 RX ADMIN — DEXMEDETOMIDINE HYDROCHLORIDE 4 MCG: 100 INJECTION, SOLUTION INTRAVENOUS at 11:03

## 2023-03-23 RX ADMIN — PROPOFOL 100 MG: 10 INJECTION, EMULSION INTRAVENOUS at 09:03

## 2023-03-23 RX ADMIN — DEXMEDETOMIDINE HYDROCHLORIDE 12 MCG: 100 INJECTION, SOLUTION INTRAVENOUS at 09:03

## 2023-03-23 RX ADMIN — SUCCINYLCHOLINE CHLORIDE 180 MG: 20 INJECTION, SOLUTION INTRAMUSCULAR; INTRAVENOUS at 09:03

## 2023-03-23 RX ADMIN — PHENYLEPHRINE HYDROCHLORIDE 100 MCG: 10 INJECTION INTRAVENOUS at 09:03

## 2023-03-23 RX ADMIN — DEXAMETHASONE SODIUM PHOSPHATE 8 MG: 4 INJECTION, SOLUTION INTRA-ARTICULAR; INTRALESIONAL; INTRAMUSCULAR; INTRAVENOUS; SOFT TISSUE at 09:03

## 2023-03-23 RX ADMIN — FENTANYL CITRATE 50 MCG: 50 INJECTION, SOLUTION INTRAMUSCULAR; INTRAVENOUS at 09:03

## 2023-03-23 RX ADMIN — SUGAMMADEX 200 MG: 100 INJECTION, SOLUTION INTRAVENOUS at 11:03

## 2023-03-23 RX ADMIN — ONDANSETRON 4 MG: 2 INJECTION INTRAMUSCULAR; INTRAVENOUS at 09:03

## 2023-03-23 RX ADMIN — ACETAMINOPHEN 1000 MG: 10 INJECTION, SOLUTION INTRAVENOUS at 09:03

## 2023-03-23 NOTE — ANESTHESIA PROCEDURE NOTES
Intubation    Date/Time: 3/23/2023 9:33 AM  Performed by: Aron Nelson CRNA  Authorized by: Joselo Vigil MD     Intubation:     Induction:  Intravenous    Intubated:  Postinduction    Mask Ventilation:  Easy with oral airway    Attempts:  1    Attempted By:  Student    Method of Intubation:  Video laryngoscopy    Blade:  Gupta 4    Laryngeal View Grade: Grade I - full view of cords      Difficult Airway Encountered?: No      Complications:  None    Airway Device:  Oral endotracheal tube    Airway Device Size:  7.5    Style/Cuff Inflation:  Cuffed    Inflation Amount (mL):  5    Tube secured:  22    Secured at:  The lips    Placement Verified By:  Capnometry    Complicating Factors:  None    Findings Post-Intubation:  BS equal bilateral and atraumatic/condition of teeth unchanged

## 2023-03-23 NOTE — TRANSFER OF CARE
"Anesthesia Transfer of Care Note    Patient: Shelly Gannon Jr.    Procedure(s) Performed: Procedure(s) (LRB):  REPAIR,TENDON,DISTAL PROXIMAL (Left)    Patient location: PACU    Anesthesia Type: general    Transport from OR: Transported from OR on 2-3 L/min O2 by NC with adequate spontaneous ventilation    Post pain: adequate analgesia    Post assessment: no apparent anesthetic complications and tolerated procedure well    Post vital signs: stable    Level of consciousness: sedated and responds to stimulation    Nausea/Vomiting: no nausea/vomiting    Complications: none    Transfer of care protocol was followed      Last vitals:   Visit Vitals  BP (!) 146/98 (BP Location: Right arm, Patient Position: Lying)   Pulse 65   Temp 36.7 °C (98.1 °F) (Oral)   Resp 14   Ht 5' 11" (1.803 m)   Wt 106.6 kg (235 lb)   SpO2 95%   BMI 32.78 kg/m²     "

## 2023-03-23 NOTE — ANESTHESIA PREPROCEDURE EVALUATION
03/23/2023  Shelly Gannon Jr. is a 47 y.o., male.      Pre-op Assessment    I have reviewed the NPO Status.   I have reviewed the Medications.     Review of Systems  Anesthesia Hx:  No problems with previous Anesthesia    Social:  Non-Smoker    Cardiovascular:   Exercise tolerance: good Hypertension ECG has been reviewed.    Pulmonary:  Pulmonary Normal    Hepatic/GI:  Hepatic/GI Normal    Neurological:   Headaches    Endocrine:  Endocrine Normal        Physical Exam  General: Well nourished    Airway:  Mallampati: II / I  Mouth Opening: Normal  TM Distance: Normal  Tongue: Normal  Neck ROM: Normal ROM    Dental:  Intact    Chest/Lungs:  Clear to auscultation, Normal Respiratory Rate        Anesthesia Plan  Type of Anesthesia, risks & benefits discussed:    Anesthesia Type: Gen ETT  Intra-op Monitoring Plan: Standard ASA Monitors  Post Op Pain Control Plan: multimodal analgesia and IV/PO Opioids PRN  Induction:  IV  Airway Plan: Direct  Informed Consent: Informed consent signed with the Patient and all parties understand the risks and agree with anesthesia plan.  All questions answered. Patient consented to blood products? No  ASA Score: 2    Ready For Surgery From Anesthesia Perspective.     .

## 2023-03-23 NOTE — ANESTHESIA POSTPROCEDURE EVALUATION
Anesthesia Post Evaluation    Patient: Shelly Gannon Jr.    Procedure(s) Performed: Procedure(s) (LRB):  REPAIR,TENDON,DISTAL PROXIMAL (Left)    Final Anesthesia Type: general      Patient location during evaluation: PACU  Patient participation: Yes- Able to Participate  Level of consciousness: awake and alert and oriented  Post-procedure vital signs: reviewed and stable  Pain management: adequate  Airway patency: patent  ELISEO mitigation strategies: Multimodal analgesia and Extubation while patient is awake  PONV status at discharge: No PONV  Anesthetic complications: no      Cardiovascular status: blood pressure returned to baseline  Respiratory status: unassisted, spontaneous ventilation and room air  Hydration status: euvolemic  Follow-up not needed.          Vitals Value Taken Time   /88 03/23/23 1410   Temp 36.7 °C (98 °F) 03/23/23 1337   Pulse 73 03/23/23 1410   Resp 16 03/23/23 1410   SpO2 100 % 03/23/23 1410         Event Time   Out of Recovery 13:35:00         Pain/Lobo Score: Pain Rating Prior to Med Admin: 2 (3/23/2023 12:11 PM)  Pain Rating Post Med Admin: 3 (3/23/2023  1:34 PM)  Lobo Score: 10 (3/23/2023 12:45 PM)  Modified Lobo Score: 20 (3/23/2023  2:10 PM)
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
## 2023-03-23 NOTE — DISCHARGE INSTRUCTIONS
"He will be nonweightbearing for 6-8 weeks.  We will get him started in early range of motion once his wound heals in 2 weeks.  We will avoid any resisted elbow extension for 8-10 weeks     Cornell Chan MD       Discharge Instructions: After Your Surgery/Procedure  Youve just had surgery. During surgery you were given medicine called anesthesia to keep you relaxed and free of pain. After surgery you may have some pain or nausea. This is common. Here are some tips for feeling better and getting well after surgery.     Stay on schedule with your medication.   Going home  Your doctor or nurse will show you how to take care of yourself when you go home. He or she will also answer your questions. Have an adult family member or friend drive you home.      For your safety we recommend these precaution for the first 24 hours after your procedure:  Do not drive or use heavy equipment.  Do not make important decisions or sign legal papers.  Do not drink alcohol.  Have someone stay with you, if needed. He or she can watch for problems and help keep you safe.  Your concentration, balance, coordination, and judgement may be impaired for many hours after anesthesia.  Use caution when ambulating or standing up.     You may feel weak and "washed out" after anesthesia and surgery.      Subtle residual effects of general anesthesia or sedation with regional / local anesthesia can last more than 24 hours.  Rest for the remainder of the day or longer if your Doctor/Surgeon has advised you to do so.  Although you may feel normal within the first 24 hours, your reflexes and mental ability may be impaired without you realizing it.  You may feel dizzy, lightheaded or sleepy for 24 hours or longer.      Be sure to go to all follow-up visits with your doctor. And rest after your surgery for as long as your doctor tells you to.  Coping with pain  If you have pain after surgery, pain medicine will help you feel better. Take it as told, " before pain becomes severe. Also, ask your doctor or pharmacist about other ways to control pain. This might be with heat, ice, or relaxation. And follow any other instructions your surgeon or nurse gives you.  Tips for taking pain medicine  To get the best relief possible, remember these points:  Pain medicines can upset your stomach. Taking them with a little food may help.  Most pain relievers taken by mouth need at least 20 to 30 minutes to start to work.  Taking medicine on a schedule can help you remember to take it. Try to time your medicine so that you can take it before starting an activity. This might be before you get dressed, go for a walk, or sit down for dinner.  Constipation is a common side effect of pain medicines. Call your doctor before taking any medicines such as laxatives or stool softeners to help ease constipation. Also ask if you should skip any foods. Drinking lots of fluids and eating foods such as fruits and vegetables that are high in fiber can also help. Remember, do not take laxatives unless your surgeon has prescribed them.  Drinking alcohol and taking pain medicine can cause dizziness and slow your breathing. It can even be deadly. Do not drink alcohol while taking pain medicine.  Pain medicine can make you react more slowly to things. Do not drive or run machinery while taking pain medicine.  Your health care provider may tell you to take acetaminophen to help ease your pain. Ask him or her how much you are supposed to take each day. Acetaminophen or other pain relievers may interact with your prescription medicines or other over-the-counter (OTC) drugs. Some prescription medicines have acetaminophen and other ingredients. Using both prescription and OTC acetaminophen for pain can cause you to overdose. Read the labels on your OTC medicines with care. This will help you to clearly know the list of ingredients, how much to take, and any warnings. It may also help you not take too  much acetaminophen. If you have questions or do not understand the information, ask your pharmacist or health care provider to explain it to you before you take the OTC medicine.  Managing nausea  Some people have an upset stomach after surgery. This is often because of anesthesia, pain, or pain medicine, or the stress of surgery. These tips will help you handle nausea and eat healthy foods as you get better. If you were on a special food plan before surgery, ask your doctor if you should follow it while you get better. These tips may help:  Do not push yourself to eat. Your body will tell you when to eat and how much.  Start off with clear liquids and soup. They are easier to digest.  Next try semi-solid foods, such as mashed potatoes, applesauce, and gelatin, as you feel ready.  Slowly move to solid foods. Dont eat fatty, rich, or spicy foods at first.  Do not force yourself to have 3 large meals a day. Instead eat smaller amounts more often.  Take pain medicines with a small amount of solid food, such as crackers or toast, to avoid nausea.     Call your surgeon if  You still have pain an hour after taking medicine. The medicine may not be strong enough.  You feel too sleepy, dizzy, or groggy. The medicine may be too strong.  You have side effects like nausea, vomiting, or skin changes, such as rash, itching, or hives.       If you have obstructive sleep apnea  You were given anesthesia medicine during surgery to keep you comfortable and free of pain. After surgery, you may have more apnea spells because of this medicine and other medicines you were given. The spells may last longer than usual.   At home:  Keep using the continuous positive airway pressure (CPAP) device when you sleep. Unless your health care provider tells you not to, use it when you sleep, day or night. CPAP is a common device used to treat obstructive sleep apnea.  Talk with your provider before taking any pain medicine, muscle relaxants, or  sedatives. Your provider will tell you about the possible dangers of taking these medicines.  © 6955-4237 [employer]. [address] [place] [address], [place] [address]. All rights reserved. This information is not intended as a substitute for professional medical care. Always follow your healthcare professional's instructions.     Post op instructions for prevention of DVT  What is deep vein thrombosis?  Deep vein thrombosis (DVT) is the medical term for blood clots in the deep veins of the leg.  These blood clots can be dangerous.  A DVT can block a blood vessel and keep blood from getting where it needs to go.  Another problem is that the clot can travel to other parts of the body such as the lungs.  A clot that travels to the lungs is called a pulmonary embolus (PE) and can cause serious problems with breathing which can lead to death.  Am I at risk for DVT/PE?  If you are not very active, you are at risk of DVT.  Anyone confined to bed, sitting for long periods of time, recovering from surgery, etc. increases the risk of DVT.  Other risk factors are cancer diagnosis, certain medications, estrogen replacement in any form,older age, obesity, pregnancy, smoking, history of clotting disorders, and dehydration.  How will I know if I have a DVT?  Swelling in the lower leg  Pain  Warmth, redness, hardness or bulging of the vein  If you have any of these symptoms, call your doctors office right away.  Some people will not have any symptoms until the clot moves to the lungs.  What are the symptoms of a PE?  Panting, shortness of breath, or trouble breathing  Sharp, knife-like chest pain when you breathe  Coughing or coughing up blood  Rapid heartbeat  If you have any of these symptoms or get worse quickly, call 911 for emergency treatment.  How can I prevent a DVT?  Avoid long periods of inactivity and dont cross your legs--get up and walk around every hour or so.  Stay active--walking after surgery is highly  encouraged.  This means you should get out of the house and walk in the neighborhood.  Going up and down stairs will not impair healing (unless advised against such activity by your doctor).    Drink plenty of noncaffeinated, nonalcoholic fluids each day to prevent dehydration.  Wear special support stockings, if they have been advised by your doctor.  If you travel, stop at least once an hour and walk around.  Avoid smoking (assistance with stopping is available through your healthcare provider)  Always notify your doctor if you are not able to follow the post operative instructions that are given to you at the time of discharge.  It may be necessary to prescribe one of the medications available to prevent DVT.     Using an Incentive Spirometer  An incentive spirometer is a device that helps you do deep breathing exercises. These exercises expand your lungs, aid in circulation, and help prevent pneumonia. Deep breathing exercises also help you breathe better and improve the function of your lungs by:  Keeping your lungs clear  Strengthening your breathing muscles  Helping prevent respiratory complications or problems  The incentive spirometer gives you a way to take an active part in recover. A nurse or therapist will teach you breathing exercises. To do these exercises, you will breathe in through your mouth and not your nose. The incentive spirometer only works correctly if you breathe in through your mouth.  Steps to clear lungs  Step 1. Exhale normally. Then, inhale normally.  Relax and breathe out.  Step 2. Place your lips tightly around the mouthpiece.  Make sure the device is upright and not tilted.  Step 3. Inhale as much air as you can through the mouthpiece (don't breath through your nose).  Inhale slowly and deeply.  Hold your breath long enough to keep the balls or disk raised for at least 3 to 5 seconds, or as instructed by your healthcare provider.  Some spirometers have an indicator to let you know  that you are breathing in too fast. If the indicator goes off, breathe in more slowly.  Step 4. Repeat the exercise regularly.  Do this exercise every hour while you're awake, or as instructed by your healthcare provider.  If you were taught deep breathing and coughing exercises, do them regularly as instructed by your healthcare provider.

## 2023-03-23 NOTE — PLAN OF CARE
Meets criteria for discharge. Discharged to home with friend. Denies nausea. Tolerating fluids. Denies pain. Steady gait. Voiding without difficulty. Discharge instructions reviewed and printed handout given. Ice pack given. Verbalized understanding.

## 2023-03-24 ENCOUNTER — TELEPHONE (OUTPATIENT)
Dept: ORTHOPEDICS | Facility: CLINIC | Age: 48
End: 2023-03-24

## 2023-03-24 VITALS
HEIGHT: 71 IN | RESPIRATION RATE: 16 BRPM | SYSTOLIC BLOOD PRESSURE: 133 MMHG | DIASTOLIC BLOOD PRESSURE: 88 MMHG | TEMPERATURE: 98 F | BODY MASS INDEX: 32.9 KG/M2 | OXYGEN SATURATION: 100 % | HEART RATE: 73 BPM | WEIGHT: 235 LBS

## 2023-03-24 DIAGNOSIS — S46.312A TRICEPS TENDON RUPTURE, LEFT, INITIAL ENCOUNTER: Primary | ICD-10-CM

## 2023-03-24 RX ORDER — CYCLOBENZAPRINE HCL 10 MG
10 TABLET ORAL 3 TIMES DAILY PRN
Qty: 30 TABLET | Refills: 0 | Status: SHIPPED | OUTPATIENT
Start: 2023-03-24 | End: 2023-04-03

## 2023-03-24 RX ORDER — OXYCODONE AND ACETAMINOPHEN 10; 325 MG/1; MG/1
1 TABLET ORAL EVERY 6 HOURS PRN
Qty: 28 TABLET | Refills: 0 | OUTPATIENT
Start: 2023-03-24 | End: 2023-05-04

## 2023-03-24 NOTE — TELEPHONE ENCOUNTER
----- Message from Sienna Smith sent at 3/24/2023  1:27 PM CDT -----  Contact: [phone]  Type: Needs Medical Advice  Who Called:  Pt     Best Call Back Number: [phone]    Additional Information: Pt stated his flexeril was not sent to the pharmacy after surgery. Pls call back and advise         [address] [facility]  [address] [address]  Phone: [phone] Fax: [phone]

## 2023-03-24 NOTE — PLAN OF CARE
"Released from [facility] when criteria met pain controlled skin w+d No nausea No emesis dsg dry intact aaox4 encouraged deep breaths Pt has all belongings    with pt in postop elevated l elbow with ace wrap on pillow and ice pk instr to keep clean and dry+2 radial pulses states ready for d/c rates pain 2-3/10 tolerable "   "

## 2023-03-24 NOTE — TELEPHONE ENCOUNTER
Called and spoke to pt's wife Jane. Jane states that the pharmacy says they need authorization from Dr. Chan for the med refill. I Informed Jane that we haven't received anything from the pharmacy for authorization and that in the pt's chart it states the mediation has been sent and confirmed by pharmacy. I advised Jane to check with the pharmacy, because she states she has not done so since around 1pm today. I informed her that Dr. Chan's nurse david sent me a message before she left for the day stating that if this pt calls about meds to let them know it has been sent to pharmacy. Jane MATTHEW

## 2023-03-24 NOTE — BRIEF OP NOTE
[facility]  Brief Operative Note    Surgery Date: 3/23/2023     Surgeon(s) and Role:     * Cornell Chan MD - Primary    Assisting Surgeon: None    Pre-op Diagnosis:  Triceps tendon rupture, left, initial encounter [S46.312A]  Pre-op exam [Z01.818]    Post-op Diagnosis:  Post-Op Diagnosis Codes:     * Triceps tendon rupture, left, initial encounter [S46.312A]     * Pre-op exam [Z01.818]    Procedure(s) (LRB):  REPAIR,TENDON,DISTAL PROXIMAL (Left)    Anesthesia: General/Regional    Operative Findings: see op note     Estimated Blood Loss: 70 mL         Specimens:   Specimen (24h ago, onward)      None              Discharge Note    OUTCOME: Patient tolerated treatment/procedure well without complication and is now ready for discharge.    DISPOSITION: Home or Self Care    FINAL DIAGNOSIS:  <principal problem not specified>    FOLLOWUP: In clinic    DISCHARGE INSTRUCTIONS:    Discharge Procedure Orders   Diet general     Call MD for:  temperature >100.4     Call MD for:  persistent nausea and vomiting     Call MD for:  severe uncontrolled pain     Call MD for:  difficulty breathing, headache or visual disturbances     Call MD for:  redness, tenderness, or signs of infection (pain, swelling, redness, odor or green/yellow discharge around incision site)     Call MD for:  hives     Call MD for:  persistent dizziness or light-headedness     Call MD for:  extreme fatigue     Keep surgical extremity elevated     Ice to affected area     No driving, operating heavy equipment or signing legal documents while taking pain medication.     Leave dressing on - Keep it clean, dry, and intact until clinic visit

## 2023-03-24 NOTE — TELEPHONE ENCOUNTER
----- Message from Eddie Patel sent at 3/24/2023  4:00 PM CDT -----  Regardinnd call pt in severe pain, call wife Jane   Contact: Jane   2nd call pt in severe pain, call wife Jane Craig , pharmacy waiting on Dr LAZARUS valdez for the OXY 10

## 2023-03-24 NOTE — TELEPHONE ENCOUNTER
Pt came to clinic stating pain would not let him sleep last night. States given pain medication gave him no relief. Pt requesting increased dosage of medication.  pended

## 2023-03-27 ENCOUNTER — TELEPHONE (OUTPATIENT)
Dept: ORTHOPEDICS | Facility: CLINIC | Age: 48
End: 2023-03-27

## 2023-03-27 NOTE — TELEPHONE ENCOUNTER
----- Message from Adry Moore MA sent at 3/27/2023  2:53 PM CDT -----  Contact: pt  Pt wants to reschedule appt   Call back      Wants earlier      Fall with Harm Risk

## 2023-03-29 ENCOUNTER — HOSPITAL ENCOUNTER (EMERGENCY)
Facility: HOSPITAL | Age: 48
Discharge: HOME | End: 2023-03-30
Attending: EMERGENCY MEDICINE
Payer: MEDICAID

## 2023-03-29 DIAGNOSIS — G89.18 POST-OPERATIVE PAIN: ICD-10-CM

## 2023-03-29 DIAGNOSIS — Z51.89 VISIT FOR WOUND CHECK: Primary | ICD-10-CM

## 2023-03-29 PROCEDURE — 99282 EMERGENCY DEPT VISIT SF MDM: CPT

## 2023-03-30 VITALS
TEMPERATURE: 99 F | SYSTOLIC BLOOD PRESSURE: 160 MMHG | WEIGHT: 235 LBS | BODY MASS INDEX: 31.83 KG/M2 | DIASTOLIC BLOOD PRESSURE: 103 MMHG | HEART RATE: 70 BPM | HEIGHT: 72 IN | OXYGEN SATURATION: 98 % | RESPIRATION RATE: 16 BRPM

## 2023-03-30 NOTE — DISCHARGE INSTRUCTIONS
Follow-up with your orthopedic surgeon for further evaluation and wound care.  Return to emergency department for worsening symptoms or any problems.  Keep splint in place and follow-up with your orthopedic surgeon

## 2023-03-30 NOTE — ED PROVIDER NOTES
Encounter Date: 3/29/2023       History     Chief Complaint   Patient presents with    Post-op Problem     Things he popped a stitch in his arm     47-year-old male about a week ago had left elbow surgery and tendon repair.  Patient was working in his garage today and felt a pop and felt a stitch might have come lose in the left elbow so came here.  Denies any other complaints.  Patient also feels his splint might be tight.    Review of patient's allergies indicates:   Allergen Reactions    Ace inhibitors Other (See Comments)     cough     Past Medical History:   Diagnosis Date    Allergy     Anxiety     Hypertension     Migraine headache     Migraine syndrome      Past Surgical History:   Procedure Laterality Date    BACK SURGERY      FRACTURE SURGERY      LARYNGOSCOPY N/A 8/18/2022    Procedure: Suspension microlaryngoscopy with KTP laser assisted excision of lesion;  Surgeon: José Wiggins MD;  Location: [address];  Service: ENT;  Laterality: N/A;  Microscope, telescopes, tower, microinstruments, KTP laser; laser confirmed on 8/18 at 10am #[id number] (cL8/12)    LEFT HEART CATHETERIZATION Left 9/19/2019    Procedure: CATHETERIZATION, HEART, LEFT  (RIGHT RADIAL ACCESS);  Surgeon: Ronnie Bailey MD;  Location: [facility] CATH/EP LAB;  Service: Cardiology;  Laterality: Left;    LUMBAR DISC SURGERY Bilateral     L4- L5 , 2016 [facility]    REPAIR,TENDON,DISTAL PROXIMAL Left 3/23/2023    Procedure: REPAIR,TENDON,DISTAL PROXIMAL;  Surgeon: Cornell Chan MD;  Location: [facility];  Service: Orthopedics;  Laterality: Left;    VOCAL FOLD LESION EXCISION  2008     Family History   Problem Relation Age of Onset    Allergic rhinitis Mother     Hypertension Mother     Heart disease Mother     Stroke Mother     Heart disease Father 49    Cancer Maternal Aunt         breast    Heart disease Maternal Aunt     Angioedema Neg Hx     Asthma Neg Hx     Atopy Neg Hx     Eczema Neg Hx     Immunodeficiency Neg Hx      Urticaria Neg Hx      Social History     Tobacco Use    Smoking status: Never    Smokeless tobacco: Never   Substance Use Topics    Alcohol use: No    Drug use: No     Review of Systems   Constitutional: Negative.    HENT: Negative.     Eyes: Negative.    Respiratory: Negative.     Cardiovascular: Negative.    Gastrointestinal: Negative.    Endocrine: Negative.    Genitourinary: Negative.    Skin: Negative.    Allergic/Immunologic: Negative.    Neurological: Negative.    Hematological: Negative.    Psychiatric/Behavioral: Negative.     All other systems reviewed and are negative.    Physical Exam     Initial Vitals [03/29/23 2353]   BP Pulse Resp Temp SpO2   (!) 175/106 69 16 98.5 °F (36.9 °C) 96 %      MAP       --         Physical Exam    Nursing note and vitals reviewed.  Constitutional: He appears well-developed and well-nourished.   HENT:   Head: Normocephalic and atraumatic.   Nose: Nose normal.   Eyes: Conjunctivae and EOM are normal.   Neck: No tracheal deviation present.   Normal range of motion.  Cardiovascular:  Normal rate, regular rhythm, normal heart sounds and intact distal pulses.     Exam reveals no friction rub.       No murmur heard.  Pulmonary/Chest: Breath sounds normal. No respiratory distress. He has no wheezes. He has no rales.   Abdominal: Abdomen is soft. He exhibits no distension. There is no abdominal tenderness.   Musculoskeletal:         General: Tenderness present. Normal range of motion.      Cervical back: Normal range of motion.      Comments: Mild tenderness of the left elbow area.  Splint removed and no wound dehiscence noted and no bleeding noted.  Dressing replaced and splint placed back.  Extremities neurovascularly intact after splint replaced.  No signs of wound infection.  Intact distal pulses and cap refill     Neurological: He is alert and oriented to person, place, and time. He has normal strength.   Skin: Skin is warm and dry. Capillary refill takes less than 2 seconds.    Psychiatric: He has a normal mood and affect. Thought content normal.       ED Course   Procedures  Labs Reviewed - No data to display       Imaging Results    None          Medications - No data to display  Medical Decision Making:   Differential Diagnosis:   47-year-old male presented emergency department for surgical wound recheck and no wound dehiscence or evidence of infection noted.  Splint replaced.  Discharged with return precautions and follow-up with orthopedic surgeon.                        Clinical Impression:   Final diagnoses:  [Z51.89] Visit for wound check (Primary)  [G89.18] Post-operative pain        ED Disposition Condition    Discharge Stable          ED Prescriptions    None       Follow-up Information       Follow up With Specialties Details Why Contact Info    Steven Alba MD Family Medicine In 2 days  [address] [facility] [address]  [phone]               Dede Clay MD  03/30/23 0047

## 2023-03-31 ENCOUNTER — OFFICE VISIT (OUTPATIENT)
Dept: ORTHOPEDICS | Facility: CLINIC | Age: 48
End: 2023-03-31
Payer: MEDICAID

## 2023-03-31 VITALS — BODY MASS INDEX: 32.9 KG/M2 | WEIGHT: 235 LBS | RESPIRATION RATE: 16 BRPM | HEIGHT: 71 IN

## 2023-03-31 DIAGNOSIS — S46.312A TRICEPS TENDON RUPTURE, LEFT, INITIAL ENCOUNTER: Primary | ICD-10-CM

## 2023-03-31 PROCEDURE — 3008F BODY MASS INDEX DOCD: CPT | Mod: CPTII,,, | Performed by: ORTHOPAEDIC SURGERY

## 2023-03-31 PROCEDURE — 3008F PR BODY MASS INDEX (BMI) DOCUMENTED: ICD-10-PCS | Mod: CPTII,,, | Performed by: ORTHOPAEDIC SURGERY

## 2023-03-31 PROCEDURE — 99999 PR PBB SHADOW E&M-EST. PATIENT-LVL III: ICD-10-PCS | Mod: PBBFAC,,, | Performed by: ORTHOPAEDIC SURGERY

## 2023-03-31 PROCEDURE — 1159F PR MEDICATION LIST DOCUMENTED IN MEDICAL RECORD: ICD-10-PCS | Mod: CPTII,,, | Performed by: ORTHOPAEDIC SURGERY

## 2023-03-31 PROCEDURE — 99999 PR PBB SHADOW E&M-EST. PATIENT-LVL III: CPT | Mod: PBBFAC,,, | Performed by: ORTHOPAEDIC SURGERY

## 2023-03-31 PROCEDURE — 99213 OFFICE O/P EST LOW 20 MIN: CPT | Mod: PBBFAC,PN | Performed by: ORTHOPAEDIC SURGERY

## 2023-03-31 PROCEDURE — 99024 PR POST-OP FOLLOW-UP VISIT: ICD-10-PCS | Mod: ,,, | Performed by: ORTHOPAEDIC SURGERY

## 2023-03-31 PROCEDURE — 1159F MED LIST DOCD IN RCRD: CPT | Mod: CPTII,,, | Performed by: ORTHOPAEDIC SURGERY

## 2023-03-31 PROCEDURE — 99024 POSTOP FOLLOW-UP VISIT: CPT | Mod: ,,, | Performed by: ORTHOPAEDIC SURGERY

## 2023-03-31 NOTE — LETTER
March 31, 2023    Shelly Gannon Jr.  [address] [facility] [address]         [facility]  [address] [place] [address] [address]  [facility] [address]  Phone: [phone] March 31, 2023     Patient: Shelly Gannon Jr.   YOB: 1975   Date of Visit: 3/31/2023       To Whom It May Concern:    It is my medical opinion that Shelly Gannon may return to work on 04/03/2023. He can return to work with limited use of the left upper extremity including no heavy lifting greater than 5 lb.  No climbing ladders, no reaching overhead/overhead work, and no push pull.     If you have any questions or concerns, please don't hesitate to call.    Sincerely,        Cornell Chan MD

## 2023-03-31 NOTE — PROGRESS NOTES
Post-op Note    HPI    Shelly Gannon Jr. is here 2 weeks s/p the following procedure:     Left elbow distal triceps repair, reconstruction    Overall doing well. Pain controlled on current regimen. He is not yet currently enrolled in Physical Therapy. Denies any chest pain or shortness of breathe. Denies any drainage from the incision. Denies any fevers, chills or paresthesias.     Placed in a long-arm splint postoperatively.      Physical Exam:     Patient is alert and oriented no acute distress.   Assistive Device:  Long arm splint and sling    Left posterior elbow Incision(s) are well healed.  There is no evidence of dehiscence.  There is no induration erythema or signs of infection.  Appropriate soft tissue swelling.  Compartments are soft and compressible.  Warm well-perfused extremity.  10° of extension and 90° of flexion.  He is able to extend elbow against gravity.        Assessment    Shelly Gannon Jr. is 2 weeks Post-op     Plan:    Overall doing as expected.  We discussed expectations of surgery and postoperative course.     Pain: Continued postoperative pain regimen -- Tylenol and ibuprofen    PT/OT: Continue/Initiate physical therapy (weight bearing status:  Nonweightbearing), okay for passive range of motion and gravity and active assisted extension of the elbow.  No resisted elbow extension.      We also discussed return to work.  He can return to work with limited use of the left upper extremity including no heavy lifting greater than 5 lb.  No climbing ladders.     Follow-up: 4 weeks

## 2023-04-11 ENCOUNTER — PATIENT MESSAGE (OUTPATIENT)
Dept: ADMINISTRATIVE | Facility: HOSPITAL | Age: 48
End: 2023-04-11

## 2023-04-28 ENCOUNTER — OFFICE VISIT (OUTPATIENT)
Dept: ORTHOPEDICS | Facility: CLINIC | Age: 48
End: 2023-04-28
Payer: MEDICAID

## 2023-04-28 VITALS — RESPIRATION RATE: 16 BRPM | HEIGHT: 71 IN | WEIGHT: 235 LBS | BODY MASS INDEX: 32.9 KG/M2

## 2023-04-28 DIAGNOSIS — S46.312A TRICEPS TENDON RUPTURE, LEFT, INITIAL ENCOUNTER: Primary | ICD-10-CM

## 2023-04-28 PROCEDURE — 3008F BODY MASS INDEX DOCD: CPT | Mod: CPTII,,, | Performed by: ORTHOPAEDIC SURGERY

## 2023-04-28 PROCEDURE — 99999 PR PBB SHADOW E&M-EST. PATIENT-LVL III: ICD-10-PCS | Mod: PBBFAC,,, | Performed by: ORTHOPAEDIC SURGERY

## 2023-04-28 PROCEDURE — 99999 PR PBB SHADOW E&M-EST. PATIENT-LVL III: CPT | Mod: PBBFAC,,, | Performed by: ORTHOPAEDIC SURGERY

## 2023-04-28 PROCEDURE — 99024 PR POST-OP FOLLOW-UP VISIT: ICD-10-PCS | Mod: ,,, | Performed by: ORTHOPAEDIC SURGERY

## 2023-04-28 PROCEDURE — 3008F PR BODY MASS INDEX (BMI) DOCUMENTED: ICD-10-PCS | Mod: CPTII,,, | Performed by: ORTHOPAEDIC SURGERY

## 2023-04-28 PROCEDURE — 1159F PR MEDICATION LIST DOCUMENTED IN MEDICAL RECORD: ICD-10-PCS | Mod: CPTII,,, | Performed by: ORTHOPAEDIC SURGERY

## 2023-04-28 PROCEDURE — 99024 POSTOP FOLLOW-UP VISIT: CPT | Mod: ,,, | Performed by: ORTHOPAEDIC SURGERY

## 2023-04-28 PROCEDURE — 1159F MED LIST DOCD IN RCRD: CPT | Mod: CPTII,,, | Performed by: ORTHOPAEDIC SURGERY

## 2023-04-28 PROCEDURE — 99213 OFFICE O/P EST LOW 20 MIN: CPT | Mod: PBBFAC,PN | Performed by: ORTHOPAEDIC SURGERY

## 2023-04-28 NOTE — PROGRESS NOTES
Post-op Note    HPI    Shelly Gannon Jr. is here 6 weeks s/p the following procedure:     Left elbow distal triceps repair, reconstruction    Overall doing well. Pain controlled on current regimen.  Has not been able to go to PT.  Otherwise doing exceptionally well.  He has no pain today.  Just endorses some burning sensation around the incision and some swelling over the olecranon.  Denies any fevers chills drainage.      Physical Exam:     Patient is alert and oriented no acute distress.   Assistive Device:     Left posterior elbow Incision(s) are well healed.  There is no evidence of dehiscence.  There is no induration erythema or signs of infection.  Mild bursitis over the olecranon bursa.  Compartments are soft and compressible.  Warm well-perfused extremity.  0° of extension and 140° of flexion.  He is able to extend elbow against gravity.        Assessment    Shelly Gannon Jr. is 6 weeks Post-op     Plan:    Overall doing very well  We discussed expectations of surgery and postoperative course.     Pain: Continued postoperative pain regimen -- Tylenol and ibuprofen    PT/OT: Continue/Initiate physical therapy (weight bearing status:  10 lb weight restriction), No resisted elbow extension.    Elbow compression sleeve for elbow bursitis of the olecranon    Follow up 6 weeks

## 2023-05-04 ENCOUNTER — HOSPITAL ENCOUNTER (EMERGENCY)
Facility: HOSPITAL | Age: 48
Discharge: HOME | End: 2023-05-04
Attending: EMERGENCY MEDICINE
Payer: MEDICAID

## 2023-05-04 VITALS
WEIGHT: 235 LBS | HEIGHT: 72 IN | BODY MASS INDEX: 31.83 KG/M2 | SYSTOLIC BLOOD PRESSURE: 169 MMHG | OXYGEN SATURATION: 99 % | HEART RATE: 64 BPM | TEMPERATURE: 98 F | RESPIRATION RATE: 19 BRPM | DIASTOLIC BLOOD PRESSURE: 97 MMHG

## 2023-05-04 DIAGNOSIS — R52 PAIN: ICD-10-CM

## 2023-05-04 DIAGNOSIS — S92.354A CLOSED NONDISPLACED FRACTURE OF FIFTH METATARSAL BONE OF RIGHT FOOT, INITIAL ENCOUNTER: Primary | ICD-10-CM

## 2023-05-04 PROCEDURE — 99283 EMERGENCY DEPT VISIT LOW MDM: CPT

## 2023-05-04 PROCEDURE — 25000003 PHARM REV CODE 250: Performed by: NURSE PRACTITIONER

## 2023-05-04 RX ORDER — NAPROXEN 250 MG/1
500 TABLET ORAL
Status: COMPLETED | OUTPATIENT
Start: 2023-05-04 | End: 2023-05-04

## 2023-05-04 RX ORDER — OXYCODONE AND ACETAMINOPHEN 5; 325 MG/1; MG/1
1 TABLET ORAL EVERY 6 HOURS PRN
Qty: 12 TABLET | Refills: 0 | Status: SHIPPED | OUTPATIENT
Start: 2023-05-04

## 2023-05-04 RX ORDER — TRAMADOL HYDROCHLORIDE 50 MG/1
50 TABLET ORAL
Status: COMPLETED | OUTPATIENT
Start: 2023-05-04 | End: 2023-05-04

## 2023-05-04 RX ORDER — ACETAMINOPHEN 325 MG/1
650 TABLET ORAL
Status: COMPLETED | OUTPATIENT
Start: 2023-05-04 | End: 2023-05-04

## 2023-05-04 RX ADMIN — NAPROXEN 500 MG: 250 TABLET ORAL at 08:05

## 2023-05-04 RX ADMIN — TRAMADOL HYDROCHLORIDE 50 MG: 50 TABLET, COATED ORAL at 10:05

## 2023-05-04 RX ADMIN — ACETAMINOPHEN 650 MG: 325 TABLET ORAL at 08:05

## 2023-05-04 NOTE — DISCHARGE INSTRUCTIONS
Make a follow-up appointment with your current orthopedist.  Return to the ED for any worsening of symptoms.  It is very important that you do not bear weight to this foot.
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
## 2023-05-11 ENCOUNTER — OFFICE VISIT (OUTPATIENT)
Dept: URGENT CARE | Facility: CLINIC | Age: 48
End: 2023-05-11
Payer: COMMERCIAL

## 2023-05-11 VITALS
TEMPERATURE: 98 F | DIASTOLIC BLOOD PRESSURE: 102 MMHG | HEIGHT: 72 IN | OXYGEN SATURATION: 99 % | WEIGHT: 235 LBS | SYSTOLIC BLOOD PRESSURE: 176 MMHG | RESPIRATION RATE: 15 BRPM | HEART RATE: 58 BPM | BODY MASS INDEX: 31.83 KG/M2

## 2023-05-11 DIAGNOSIS — S92.354A CLOSED NONDISPLACED FRACTURE OF FIFTH METATARSAL BONE OF RIGHT FOOT, INITIAL ENCOUNTER: Primary | ICD-10-CM

## 2023-05-11 PROCEDURE — 99203 PR OFFICE/OUTPT VISIT, NEW, LEVL III, 30-44 MIN: ICD-10-PCS | Mod: S$GLB,,, | Performed by: FAMILY MEDICINE

## 2023-05-11 PROCEDURE — 99203 OFFICE O/P NEW LOW 30 MIN: CPT | Mod: S$GLB,,, | Performed by: FAMILY MEDICINE
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
## 2023-05-11 NOTE — LETTER
[facility] - [place] [address], [address]  [facility] [address]  Phone: [phone]  Fax: [phone]  [facility] [employer]: [phone]    Pt Name: Shelly Gannon Jr.  Injury Date: 05/04/2023   Employee ID: [id number] Date of First Treatment: 05/11/2023   Company: [employer] reference to record [id number]      Appointment Time: 10:15 AM Arrived: 1015   Provider: John Novak MD Time Out:1200     Office Treatment:   1. Closed nondisplaced fracture of fifth metatarsal bone of right foot, initial encounter          Patient Instructions: Attention not to aggravate affected area, Use splint as directed, Use Crutches as directed, Referral to specialist to be scheduled, once authorized    Restrictions: Discharged to Ortho/Neuro/Opthomologist/Surgeon     Return Appointment: with ortho     You have a work related injury. Medical care and treatment required as a result of a work-related injury  should be focused on restoring functional ability required to meet your daily and work activities and return to work, while striving to restore your health to pre-injury status in so far as is feasible.  If Rx a medication that can be sedating, DO NOT TAKE DURING YOUR WORK DAY.    Some OTC measures to help in recovery(if no allergies to, renal issues or pregnant):  Tylenol 325mg 3x per day  Ibuprofen 400mg 3x per day OR Aleve regular strength one tablet 2x per day  Take Pepcid 20mg BID  If applicable or discussed: Magnesium OTC daily; Topical Voltaren Gel; Lidocaine patches  Massage area if possible  Resting of the injured area  Ice for ankle, wrist or elbow injury  Elevation of the injured area if applicable  Heating pad for muscle injury  Stretching/ROM exercises as described in clinic.   ** BE ADVISED: You should be in regular contact with your W/C  to know the status of your claim and /or (if any)pending referrals**

## 2023-06-13 ENCOUNTER — TELEPHONE (OUTPATIENT)
Dept: ORTHOPEDICS | Facility: CLINIC | Age: 48
End: 2023-06-13

## 2023-06-13 NOTE — TELEPHONE ENCOUNTER
----- Message from Blanche Mtz sent at 6/13/2023  8:06 AM CDT -----  Regarding: Patient has concerns on his arm  Patient came in & has concerns with his arm. He said something does not feel right with it & would like to have it checked out. The number on file is good to reach out to him.

## 2023-06-13 NOTE — TELEPHONE ENCOUNTER
Offered pt earlier appt to be evaluated. Pt unable to come in this Friday or next Tuesday due to vacation. Pt coming 6/23/23 as scheduled.

## 2023-06-23 ENCOUNTER — OFFICE VISIT (OUTPATIENT)
Dept: ORTHOPEDICS | Facility: CLINIC | Age: 48
End: 2023-06-23
Payer: MEDICAID

## 2023-06-23 VITALS — BODY MASS INDEX: 31.83 KG/M2 | RESPIRATION RATE: 18 BRPM | HEIGHT: 72 IN | WEIGHT: 235 LBS

## 2023-06-23 DIAGNOSIS — S46.312A TRICEPS TENDON RUPTURE, LEFT, INITIAL ENCOUNTER: Primary | ICD-10-CM

## 2023-06-23 DIAGNOSIS — M70.22 OLECRANON BURSITIS, LEFT ELBOW: ICD-10-CM

## 2023-06-23 PROCEDURE — 99214 PR OFFICE/OUTPT VISIT, EST, LEVL IV, 30-39 MIN: ICD-10-PCS | Mod: S$PBB,,, | Performed by: ORTHOPAEDIC SURGERY

## 2023-06-23 PROCEDURE — 3008F PR BODY MASS INDEX (BMI) DOCUMENTED: ICD-10-PCS | Mod: CPTII,,, | Performed by: ORTHOPAEDIC SURGERY

## 2023-06-23 PROCEDURE — 99212 OFFICE O/P EST SF 10 MIN: CPT | Mod: PBBFAC,PO | Performed by: ORTHOPAEDIC SURGERY

## 2023-06-23 PROCEDURE — 3008F BODY MASS INDEX DOCD: CPT | Mod: CPTII,,, | Performed by: ORTHOPAEDIC SURGERY

## 2023-06-23 PROCEDURE — 99999 PR PBB SHADOW E&M-EST. PATIENT-LVL II: CPT | Mod: PBBFAC,,, | Performed by: ORTHOPAEDIC SURGERY

## 2023-06-23 PROCEDURE — 99214 OFFICE O/P EST MOD 30 MIN: CPT | Mod: S$PBB,,, | Performed by: ORTHOPAEDIC SURGERY

## 2023-06-23 PROCEDURE — 99999 PR PBB SHADOW E&M-EST. PATIENT-LVL II: ICD-10-PCS | Mod: PBBFAC,,, | Performed by: ORTHOPAEDIC SURGERY
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
## 2023-08-08 DIAGNOSIS — I10 HYPERTENSION, UNSPECIFIED TYPE: ICD-10-CM

## 2023-08-08 NOTE — TELEPHONE ENCOUNTER
----- Message from Antoinette Lassiter sent at 8/8/2023 11:32 AM CDT -----  Contact: Self  Type:  RX Refill Request    Who Called:  Patient  Refill or New Rx:  New Rx  RX Name and Strength:  losartan (COZAAR) 25 MG tablet and metoprolol succinate (TOPROL-XL) 25 MG 24 hr tablet  How is the patient currently taking it? (ex. 1XDay):  As Directed  Is this a 30 day or 90 day RX:  90  Preferred Pharmacy with phone number:    [facility] [address] [place], [place] - [address]  [address] [facility] [address]  Phone: [phone] Fax: [phone]  Local or Mail Order:  Local  Ordering Provider:  Dr Anjali Curry Call Back Number:  [phone]  Additional Information:  Pt is out of the Losartan and only has a few Metoprolol left. Working to get his ins straight will call back to schedule appt. Thank you.

## 2023-08-08 NOTE — TELEPHONE ENCOUNTER
Care Due:                  Date            Visit Type   Department     Provider  --------------------------------------------------------------------------------    Last Visit: None Found      None         None Found  Next Visit: None Scheduled  None         None Found                                                            Last  Test          Frequency    Reason                     Performed    Due Date  --------------------------------------------------------------------------------    Office Visit  12 months..  metoprolol...............  Not Found    Overdue    [employer] Embedded Care Due Messages. Reference number: [id number].   8/08/2023 3:39:08 PM CDT

## 2023-08-12 RX ORDER — LOSARTAN POTASSIUM 25 MG/1
25 TABLET ORAL DAILY
Qty: 90 TABLET | Refills: 3 | Status: SHIPPED | OUTPATIENT
Start: 2023-08-12 | End: 2024-08-11

## 2023-08-12 RX ORDER — METOPROLOL SUCCINATE 25 MG/1
25 TABLET, EXTENDED RELEASE ORAL DAILY
Qty: 90 TABLET | Refills: 3 | Status: SHIPPED | OUTPATIENT
Start: 2023-08-12

## 2023-08-22 ENCOUNTER — TELEPHONE (OUTPATIENT)
Dept: ORTHOPEDICS | Facility: CLINIC | Age: 48
End: 2023-08-22

## 2023-08-22 NOTE — TELEPHONE ENCOUNTER
----- Message from Adry Moore MA sent at 8/22/2023  1:00 PM CDT -----  Contact: pt  Missed a call from this office   Call back  [phone]

## 2023-08-25 ENCOUNTER — OFFICE VISIT (OUTPATIENT)
Dept: ORTHOPEDICS | Facility: CLINIC | Age: 48
End: 2023-08-25

## 2023-08-25 VITALS — RESPIRATION RATE: 16 BRPM | BODY MASS INDEX: 31.83 KG/M2 | HEIGHT: 72 IN | WEIGHT: 235 LBS

## 2023-08-25 DIAGNOSIS — S46.312A TRICEPS TENDON RUPTURE, LEFT, INITIAL ENCOUNTER: Primary | ICD-10-CM

## 2023-08-25 PROCEDURE — 99999 PR PBB SHADOW E&M-EST. PATIENT-LVL III: ICD-10-PCS | Mod: PBBFAC,,, | Performed by: ORTHOPAEDIC SURGERY

## 2023-08-25 PROCEDURE — 99213 OFFICE O/P EST LOW 20 MIN: CPT | Mod: PBBFAC,PO | Performed by: ORTHOPAEDIC SURGERY

## 2023-08-25 PROCEDURE — 99999 PR PBB SHADOW E&M-EST. PATIENT-LVL III: CPT | Mod: PBBFAC,,, | Performed by: ORTHOPAEDIC SURGERY

## 2023-08-25 PROCEDURE — 99213 OFFICE O/P EST LOW 20 MIN: CPT | Mod: S$PBB,,, | Performed by: ORTHOPAEDIC SURGERY

## 2023-08-25 PROCEDURE — 99213 PR OFFICE/OUTPT VISIT, EST, LEVL III, 20-29 MIN: ICD-10-PCS | Mod: S$PBB,,, | Performed by: ORTHOPAEDIC SURGERY

## 2023-08-25 NOTE — PROGRESS NOTES
Patient ID: Shelly Gannon Jr. is a 48 y.o. male    Chief Complaint:   Chief Complaint   Patient presents with    Left Upper Arm - Injury, Pain       History of Present Illness:    Pleasant 48-year-old male 6 months status post left triceps repair by me.  Doing well.  No pain.  Did not do physical therapy.  Has been doing his own physical therapy.  Wants clearance to return to the gym.  Denies any issues with strength.  Does have some stiffness with extension.    PAST MEDICAL HISTORY:   Past Medical History:   Diagnosis Date    Allergy     Anxiety     Hypertension     Migraine headache     Migraine syndrome      PAST SURGICAL HISTORY:   Past Surgical History:   Procedure Laterality Date    BACK SURGERY      FRACTURE SURGERY      LARYNGOSCOPY N/A 8/18/2022    Procedure: Suspension microlaryngoscopy with KTP laser assisted excision of lesion;  Surgeon: José Wiggins MD;  Location: [address];  Service: ENT;  Laterality: N/A;  Microscope, telescopes, tower, microinstruments, KTP laser; laser confirmed on 8/18 at 10am #[id number] (cL8/12)    LEFT HEART CATHETERIZATION Left 9/19/2019    Procedure: CATHETERIZATION, HEART, LEFT  (RIGHT RADIAL ACCESS);  Surgeon: Ronnie Bailey MD;  Location: [facility] CATH/EP LAB;  Service: Cardiology;  Laterality: Left;    LUMBAR DISC SURGERY Bilateral     L4- L5 , 2016 April, Dr Trans    REPAIR,TENDON,DISTAL PROXIMAL Left 3/23/2023    Procedure: REPAIR,TENDON,DISTAL PROXIMAL;  Surgeon: Cornell Chan MD;  Location: [facility];  Service: Orthopedics;  Laterality: Left;    VOCAL FOLD LESION EXCISION  2008     FAMILY HISTORY:   Family History   Problem Relation Age of Onset    Allergic rhinitis Mother     Hypertension Mother     Heart disease Mother     Stroke Mother     Heart disease Father 49    Cancer Maternal Aunt         breast    Heart disease Maternal Aunt     Angioedema Neg Hx     Asthma Neg Hx     Atopy Neg Hx     Eczema Neg Hx     Immunodeficiency Neg Hx     Urticaria  Neg Hx      SOCIAL HISTORY:   Social History     Occupational History    Not on file   Tobacco Use    Smoking status: Never    Smokeless tobacco: Never   Substance and Sexual Activity    Alcohol use: No    Drug use: No    Sexual activity: Yes     Partners: Female        MEDICATIONS:   Current Outpatient Medications:     aspirin (ECOTRIN) 81 MG EC tablet, Take 81 mg by mouth once daily., Disp: , Rfl:     ibuprofen (ADVIL,MOTRIN) 600 MG tablet, Take 1 tablet (600 mg total) by mouth 3 (three) times daily., Disp: 90 tablet, Rfl: 0    losartan (COZAAR) 25 MG tablet, Take 1 tablet (25 mg total) by mouth once daily., Disp: 90 tablet, Rfl: 3    metoprolol succinate (TOPROL-XL) 25 MG 24 hr tablet, Take 1 tablet (25 mg total) by mouth once daily., Disp: 90 tablet, Rfl: 3    ondansetron (ZOFRAN-ODT) 4 MG TbDL, Take 1 tablet (4 mg total) by mouth every 8 (eight) hours as needed (for nausea)., Disp: 30 tablet, Rfl: 0    oxyCODONE-acetaminophen (PERCOCET) 5-325 mg per tablet, Take 1 tablet by mouth every 6 (six) hours as needed for Pain., Disp: 12 tablet, Rfl: 0    prochlorperazine (COMPAZINE) 10 MG tablet, Take 1 tablet (10 mg total) by mouth every 6 (six) hours as needed., Disp: 20 tablet, Rfl: 0    ubrogepant (UBRELVY) 50 mg tablet, Take 50 mg by mouth as needed for Migraine., Disp: , Rfl:     pantoprazole (PROTONIX) 40 MG tablet, Take 1 tablet (40 mg total) by mouth once daily., Disp: 30 tablet, Rfl: 11  No current facility-administered medications for this visit.    Facility-Administered Medications Ordered in Other Visits:     0.9%  NaCl infusion, , Intravenous, Continuous, Ronnie Bailey MD, Last Rate: 75 mL/hr at 09/19/19 0930, New Bag at 09/19/19 0930  ALLERGIES:   Review of patient's allergies indicates:   Allergen Reactions    Ace inhibitors Other (See Comments)     cough         Physical Exam     Vitals:    08/25/23 1005   Resp: 16     Alert and oriented to person, place and time. No acute distress. Well-groomed,  not ill appearing. Pupils round and reactive, normal respiratory effort, no audible wheezing.     On exam he has near full range of motion of the left elbow.  He is lacking about 5° on the left compared to the contralateral side.  He has full pronation and supination.  Is palpable intact triceps insertion.  Mild swelling of the olecranon bursa.  5/5 strength with resisted elbow extension.      Imaging:       None      Assessment & Plan    Triceps tendon rupture, left, initial encounter         Treatment options were discussed with him in detail.  Six months status post triceps repair with graft.  Overall doing very well.  No pain.  From my standpoint can return without restriction.  We did discussed continuation of home exercise program and weaning back into heavy lifting.  Follow up as needed

## 2023-09-12 ENCOUNTER — OFFICE VISIT (OUTPATIENT)
Dept: FAMILY MEDICINE | Facility: CLINIC | Age: 48
End: 2023-09-12

## 2023-09-12 ENCOUNTER — LAB VISIT (OUTPATIENT)
Dept: LAB | Facility: HOSPITAL | Age: 48
End: 2023-09-12
Attending: NURSE PRACTITIONER

## 2023-09-12 VITALS
HEIGHT: 72 IN | BODY MASS INDEX: 32.01 KG/M2 | DIASTOLIC BLOOD PRESSURE: 90 MMHG | OXYGEN SATURATION: 97 % | HEART RATE: 61 BPM | WEIGHT: 236.31 LBS | TEMPERATURE: 98 F | SYSTOLIC BLOOD PRESSURE: 142 MMHG

## 2023-09-12 DIAGNOSIS — K21.9 GASTROESOPHAGEAL REFLUX DISEASE, UNSPECIFIED WHETHER ESOPHAGITIS PRESENT: ICD-10-CM

## 2023-09-12 DIAGNOSIS — G43.909 MIGRAINE WITHOUT STATUS MIGRAINOSUS, NOT INTRACTABLE, UNSPECIFIED MIGRAINE TYPE: ICD-10-CM

## 2023-09-12 DIAGNOSIS — R10.12 LEFT UPPER QUADRANT PAIN: Primary | ICD-10-CM

## 2023-09-12 DIAGNOSIS — R10.12 LEFT UPPER QUADRANT PAIN: ICD-10-CM

## 2023-09-12 LAB
BILIRUB UR QL STRIP: NEGATIVE
CLARITY UR REFRACT.AUTO: CLEAR
COLOR UR AUTO: YELLOW
GLUCOSE UR QL STRIP: NEGATIVE
HGB UR QL STRIP: NEGATIVE
KETONES UR QL STRIP: NEGATIVE
LEUKOCYTE ESTERASE UR QL STRIP: NEGATIVE
NITRITE UR QL STRIP: NEGATIVE
PH UR STRIP: 6 [PH] (ref 5–8)
PROT UR QL STRIP: NEGATIVE
SP GR UR STRIP: 1.02 (ref 1–1.03)
URN SPEC COLLECT METH UR: NORMAL

## 2023-09-12 PROCEDURE — 99215 OFFICE O/P EST HI 40 MIN: CPT | Mod: PBBFAC,PO | Performed by: NURSE PRACTITIONER

## 2023-09-12 PROCEDURE — 99213 PR OFFICE/OUTPT VISIT, EST, LEVL III, 20-29 MIN: ICD-10-PCS | Mod: S$PBB,,, | Performed by: NURSE PRACTITIONER

## 2023-09-12 PROCEDURE — 99999 PR PBB SHADOW E&M-EST. PATIENT-LVL V: ICD-10-PCS | Mod: PBBFAC,,, | Performed by: NURSE PRACTITIONER

## 2023-09-12 PROCEDURE — 81003 URINALYSIS AUTO W/O SCOPE: CPT | Performed by: NURSE PRACTITIONER

## 2023-09-12 PROCEDURE — 99999 PR PBB SHADOW E&M-EST. PATIENT-LVL V: CPT | Mod: PBBFAC,,, | Performed by: NURSE PRACTITIONER

## 2023-09-12 PROCEDURE — 99213 OFFICE O/P EST LOW 20 MIN: CPT | Mod: S$PBB,,, | Performed by: NURSE PRACTITIONER

## 2023-09-12 RX ORDER — PANTOPRAZOLE SODIUM 40 MG/1
40 TABLET, DELAYED RELEASE ORAL DAILY
Qty: 30 TABLET | Refills: 11 | Status: SHIPPED | OUTPATIENT
Start: 2023-09-12 | End: 2024-09-11

## 2023-09-12 NOTE — PROGRESS NOTES
"Subjective:       Patient ID: Shelly Gannon Jr. is a 48 y.o. male.    Chief Complaint: No chief complaint on file.     HPI   47 y/o male patient with medical problems listed below presents for LUQ abdominal pain for 5 days. Denies recent trauma to the affected area. Patient states "this is not chest pain." Describes pain as sharp, non radiating, pain 6/10 in intensity, aggravated by movement, relieved by resting, not associated with coughing, chest pain, sob, vomiting, urinary or bowel symptoms, fever, chills, generalized body ache but endorses acid reflux and nausea. Patient  has been on pantoprazole but ran out of it a month ago. States acid reflux is getting progressively worsening since after ran out of it. Denies smoking, etoh or drug use.     Patient  is followed by Neurology outside [facility] for migraine headache.  has been on different oral medications including fioricet, percocet, NSAIDs, tylenol, nasal spray but not much relief. States he was advised to do Botox for migraine headache by Neurology but unsure of it.     Labs reviewed from 3/2023    Patient Active Problem List   Diagnosis    Knee bursitis    Palpitations    Obesity (BMI 30-39.9)    Essential hypertension    Gastroesophageal reflux disease with esophagitis    Abnormal stress test    Chest pain      Review of patient's allergies indicates:   Allergen Reactions    Ace inhibitors Other (See Comments)     cough     Past Surgical History:   Procedure Laterality Date    BACK SURGERY      FRACTURE SURGERY      LARYNGOSCOPY N/A 8/18/2022    Procedure: Suspension microlaryngoscopy with KTP laser assisted excision of lesion;  Surgeon: José Wiggins MD;  Location: [facility] OR [address] [place] [address];  Service: ENT;  Laterality: N/A;  Microscope, telescopes, tower, microinstruments, KTP laser; laser confirmed on 8/18 at 10am #[id number] (cL8/12)    LEFT HEART CATHETERIZATION Left 9/19/2019    Procedure: CATHETERIZATION, HEART, LEFT  (RIGHT RADIAL " ACCESS);  Surgeon: Ronnie Bailey MD;  Location: [facility] CATH/EP LAB;  Service: Cardiology;  Laterality: Left;    LUMBAR DISC SURGERY Bilateral     L4- L5 , 2016 April,  Trans    REPAIR,TENDON,DISTAL PROXIMAL Left 3/23/2023    Procedure: REPAIR,TENDON,DISTAL PROXIMAL;  Surgeon: Cornell Chan MD;  Location: [facility];  Service: Orthopedics;  Laterality: Left;    VOCAL FOLD LESION EXCISION  2008      Lab Results   Component Value Date    WBC 4.10 03/03/2023    HGB 15.0 03/03/2023    HCT 44.0 03/03/2023     03/03/2023    CHOL 203 (H) 12/09/2020    TRIG 220 (H) 12/09/2020    HDL 31 (L) 12/09/2020    ALT 18 03/03/2023    AST 20 03/03/2023     03/03/2023    K 3.4 (L) 03/03/2023     03/03/2023    CREATININE 1.2 03/03/2023    BUN 17 03/03/2023    CO2 25 03/03/2023    TSH 1.531 05/05/2015    INR 1.0 03/03/2023     Review of Systems   Constitutional:  Negative for chills and fever.   Respiratory:  Negative for cough, chest tightness and shortness of breath.    Cardiovascular:  Negative for chest pain and palpitations.   Gastrointestinal:  Positive for abdominal pain and nausea. Negative for constipation, diarrhea and vomiting.   Neurological:  Positive for headaches. Negative for dizziness.       Objective:   BP (!) 142/90   Pulse 61   Temp 98.2 °F (36.8 °C) (Oral)   Ht 6' (1.829 m)   Wt 107.2 kg (236 lb 5.3 oz)   SpO2 97%   BMI 32.05 kg/m²         Physical Exam  Vitals reviewed.   Constitutional:       General: He is not in acute distress.     Appearance: Normal appearance.   Cardiovascular:      Rate and Rhythm: Normal rate and regular rhythm.      Pulses: Normal pulses.      Heart sounds: Normal heart sounds.   Pulmonary:      Effort: Pulmonary effort is normal.      Breath sounds: Normal breath sounds.   Chest:      Chest wall: No deformity, swelling or edema.   Abdominal:      General: Abdomen is flat. Bowel sounds are normal.      Palpations: Abdomen is soft.      Tenderness: There is  abdominal tenderness.      Comments: +tenderness in LUQ abdomen    Musculoskeletal:      Cervical back: Normal range of motion.   Skin:     General: Skin is warm and dry.   Neurological:      General: No focal deficit present.      Mental Status: He is alert.   Psychiatric:         Mood and Affect: Mood normal.         Behavior: Behavior normal.         Assessment:       1. Left upper quadrant pain    2. Gastroesophageal reflux disease, unspecified whether esophagitis present    3. Migraine without status migrainosus, not intractable, unspecified migraine type        Plan:       1. Left upper quadrant pain  - CBC Auto Differential; Future  - Comprehensive Metabolic Panel; Future  - Lipase; Future  - Urinalysis; Future  - US Abdomen Complete; Future    2. Gastroesophageal reflux disease, unspecified whether esophagitis present  - pantoprazole (PROTONIX) 40 MG tablet; Take 1 tablet (40 mg total) by mouth once daily.  Dispense: 30 tablet; Refill: 11    3. Migraine without status migrainosus, not intractable, unspecified migraine type  - offered toradol injection in clinic but patient declined   - advised to follow up with neurology     Will follow up with result   Fred GLEZ

## 2023-09-13 ENCOUNTER — HOSPITAL ENCOUNTER (OUTPATIENT)
Dept: RADIOLOGY | Facility: HOSPITAL | Age: 48
Discharge: HOME | End: 2023-09-13
Attending: NURSE PRACTITIONER
Payer: MEDICARE

## 2023-09-13 DIAGNOSIS — R10.12 LEFT UPPER QUADRANT PAIN: ICD-10-CM

## 2023-09-13 DIAGNOSIS — S92.354A: Primary | ICD-10-CM

## 2023-09-13 PROCEDURE — 76700 US ABDOMEN COMPLETE: ICD-10-PCS | Mod: 26,,, | Performed by: RADIOLOGY

## 2023-09-13 PROCEDURE — 76700 US EXAM ABDOM COMPLETE: CPT | Mod: 26,,, | Performed by: RADIOLOGY

## 2023-09-13 PROCEDURE — 76700 US EXAM ABDOM COMPLETE: CPT | Mod: TC

## 2023-09-22 ENCOUNTER — HOSPITAL ENCOUNTER (OUTPATIENT)
Dept: RADIOLOGY | Facility: HOSPITAL | Age: 48
Discharge: HOME | End: 2023-09-22
Attending: PODIATRIST
Payer: COMMERCIAL

## 2023-09-22 DIAGNOSIS — S92.354A: ICD-10-CM

## 2023-09-22 PROCEDURE — 73700 CT LOWER EXTREMITY W/O DYE: CPT | Mod: TC,PO,RT

## 2023-11-28 ENCOUNTER — OFFICE VISIT (OUTPATIENT)
Dept: FAMILY MEDICINE | Facility: CLINIC | Age: 48
End: 2023-11-28

## 2023-11-28 VITALS
WEIGHT: 242.06 LBS | OXYGEN SATURATION: 98 % | HEART RATE: 52 BPM | SYSTOLIC BLOOD PRESSURE: 140 MMHG | HEIGHT: 72 IN | TEMPERATURE: 98 F | DIASTOLIC BLOOD PRESSURE: 98 MMHG | BODY MASS INDEX: 32.79 KG/M2

## 2023-11-28 DIAGNOSIS — J01.20 SUBACUTE ETHMOIDAL SINUSITIS: ICD-10-CM

## 2023-11-28 DIAGNOSIS — Z01.818 PREOP EXAMINATION: Primary | ICD-10-CM

## 2023-11-28 PROCEDURE — 96372 THER/PROPH/DIAG INJ SC/IM: CPT | Mod: PBBFAC,PO

## 2023-11-28 PROCEDURE — 99999 PR PBB SHADOW E&M-EST. PATIENT-LVL IV: CPT | Mod: PBBFAC,,, | Performed by: STUDENT IN AN ORGANIZED HEALTH CARE EDUCATION/TRAINING PROGRAM

## 2023-11-28 PROCEDURE — 99214 PR OFFICE/OUTPT VISIT, EST, LEVL IV, 30-39 MIN: ICD-10-PCS | Mod: S$PBB,,, | Performed by: STUDENT IN AN ORGANIZED HEALTH CARE EDUCATION/TRAINING PROGRAM

## 2023-11-28 PROCEDURE — 99999PBSHW PR PBB SHADOW TECHNICAL ONLY FILED TO HB: ICD-10-PCS | Mod: PBBFAC,,,

## 2023-11-28 PROCEDURE — 99999 PR PBB SHADOW E&M-EST. PATIENT-LVL IV: ICD-10-PCS | Mod: PBBFAC,,, | Performed by: STUDENT IN AN ORGANIZED HEALTH CARE EDUCATION/TRAINING PROGRAM

## 2023-11-28 PROCEDURE — 99214 OFFICE O/P EST MOD 30 MIN: CPT | Mod: PBBFAC,PO | Performed by: STUDENT IN AN ORGANIZED HEALTH CARE EDUCATION/TRAINING PROGRAM

## 2023-11-28 RX ORDER — METHYLPREDNISOLONE ACETATE 40 MG/ML
40 INJECTION, SUSPENSION INTRA-ARTICULAR; INTRALESIONAL; INTRAMUSCULAR; SOFT TISSUE
Status: COMPLETED | OUTPATIENT
Start: 2023-11-28 | End: 2023-11-28

## 2023-11-28 RX ADMIN — METHYLPREDNISOLONE ACETATE 40 MG: 40 INJECTION, SUSPENSION INTRA-ARTICULAR; INTRALESIONAL; INTRAMUSCULAR; SOFT TISSUE at 09:11

## 2023-11-28 NOTE — PATIENT INSTRUCTIONS
Parrish Bravo,     If you are due for any health screening(s) below please notify me so we can arrange them to be ordered and scheduled. Most healthy patients at your age complete them, but you are free to accept or refuse.     If you can't do it, I'll definitely understand. If you can, I'd certainly appreciate it!    Tests to Keep You Healthy    Colon Cancer Screening: DUE  Last Blood Pressure <= 139/89 (11/28/2023): NO      Its time for your colon cancer screening     Colorectal cancer is one of the leading causes of cancer death for men and women but it doesnt have to be. Screenings can prevent colorectal cancer or find it early enough to treat and cure the disease.     Our records indicate that you may be overdue for colon cancer screening. A colonoscopy or stool screening test can help identify patients at risk for developing colon cancer. Cancer screenings save lives, so schedule yours today to stay healthy.     A colonoscopy is the preferred test for detecting colon cancer. It is needed only once every 10 years if results are negative. While you are sedated, a flexible, lighted tube with a tiny camera is inserted into the rectum and advanced through the colon to look for cancers.     An alternative screening test that is used at home and returned to the lab may also be used. It detects hidden blood in bowel movements which could indicate cancer in the colon. If results are positive, you will need a colonoscopy to determine if the blood is a sign of cancer. This type of follow up (diagnostic) colonoscopy usually requires additional copays as required by your insurance provider.     If you recently had your colon cancer screening performed outside of [facility], please let your Health care team know so that they can update your health record. Please contact your PCP if you have any questions.    Lets manage your high blood pressure     Your blood pressure was above 140/90 today during your visit. We  recommend that you schedule a nurse visit in two weeks to check your blood pressure and discuss ways to support your health goals.     You can also manage your health and record your blood pressure from the comfort of home by keeping a daily blood pressure log. These results are shared with and reviewed by your provider. Please print this form (Daily Blood Pressure Log) to assist you in keeping track of your blood pressure at home.     Schedule your nurse visit in two weeks to learn more about how to track and manage high blood pressure.    Daily Blood Pressure Log    Name:__________________________________                  Date of Birth:_________    Average Blood Pressure:  __________      Date: Time  (a.m.) Blood  Pressure: Pulse  Rate: Time  (p.m.) Blood  Pressure : Pulse  Rate:   Sample 8:37 127/83 84

## 2023-11-28 NOTE — PROGRESS NOTES
[facility] Note    Subjective:  Chief Complaint:   Chief Complaint   Patient presents with    Pre-op Exam       History of Present Illness:  Shelly is a pleasant intelligent patient who is here for preop exam. This patient is new to me.     Foot surgery revision - removing hardware - 12/4/23 with [facility]     Allergies:  Review of patient's allergies indicates:   Allergen Reactions    Ace inhibitors Other (See Comments)     cough       Home Medications:  Current Outpatient Medications on File Prior to Visit   Medication Sig    aspirin (ECOTRIN) 81 MG EC tablet Take 81 mg by mouth once daily.    ibuprofen (ADVIL,MOTRIN) 600 MG tablet Take 1 tablet (600 mg total) by mouth 3 (three) times daily.    losartan (COZAAR) 25 MG tablet Take 1 tablet (25 mg total) by mouth once daily.    metoprolol succinate (TOPROL-XL) 25 MG 24 hr tablet Take 1 tablet (25 mg total) by mouth once daily.    ondansetron (ZOFRAN-ODT) 4 MG TbDL Take 1 tablet (4 mg total) by mouth every 8 (eight) hours as needed (for nausea).    oxyCODONE-acetaminophen (PERCOCET) 5-325 mg per tablet Take 1 tablet by mouth every 6 (six) hours as needed for Pain.    pantoprazole (PROTONIX) 40 MG tablet Take 1 tablet (40 mg total) by mouth once daily.    [DISCONTINUED] prochlorperazine (COMPAZINE) 10 MG tablet Take 1 tablet (10 mg total) by mouth every 6 (six) hours as needed.    [DISCONTINUED] ubrogepant (UBRELVY) 50 mg tablet Take 50 mg by mouth as needed for Migraine.     Current Facility-Administered Medications on File Prior to Visit   Medication    0.9%  NaCl infusion       Past Medical History:   Diagnosis Date    Allergy     Anxiety     Hypertension     Migraine headache     Migraine syndrome      Past Surgical History:   Procedure Laterality Date    BACK SURGERY      FRACTURE SURGERY      LARYNGOSCOPY N/A 8/18/2022    Procedure: Suspension microlaryngoscopy with KTP laser assisted excision of lesion;   Surgeon: José Wiggins MD;  Location: [facility] OR [facility] FLR;  Service: ENT;  Laterality: N/A;  Microscope, telescopes, tower, microinstruments, KTP laser; laser confirmed on 8/18 at 10am #[id number] (cL8/12)    LEFT HEART CATHETERIZATION Left 9/19/2019    Procedure: CATHETERIZATION, HEART, LEFT  (RIGHT RADIAL ACCESS);  Surgeon: Ronnie Bailey MD;  Location: [facility] CATH/EP LAB;  Service: Cardiology;  Laterality: Left;    LUMBAR DISC SURGERY Bilateral     L4- L5 , 2016 April,  Trans    REPAIR,TENDON,DISTAL PROXIMAL Left 3/23/2023    Procedure: REPAIR,TENDON,DISTAL PROXIMAL;  Surgeon: Cornell Chan MD;  Location: [facility] OR;  Service: Orthopedics;  Laterality: Left;    VOCAL FOLD LESION EXCISION  2008     Family History   Problem Relation Age of Onset    Allergic rhinitis Mother     Hypertension Mother     Heart disease Mother     Stroke Mother     Heart disease Father 49    Cancer Maternal Aunt         breast    Heart disease Maternal Aunt     Angioedema Neg Hx     Asthma Neg Hx     Atopy Neg Hx     Eczema Neg Hx     Immunodeficiency Neg Hx     Urticaria Neg Hx      Social History     Tobacco Use    Smoking status: Never    Smokeless tobacco: Never   Substance Use Topics    Alcohol use: No    Drug use: No            The patient's past medical history, surgical history, social history, family history, allergies and medications have been reviewed.    Review of Systems     10 point review of systems was conducted and only the pertinent positives and pertinent negatives are noted above in the HPI section.    Physical Examination  General appearance: alert, cooperative, no distress  HEENT: normocephalic, atraumatic, PERRLA, nasal congestion/boggy turbinates   Neck: trachea midline, no LAD, no thyromegaly, no neck stiffness  Lungs: clear to auscultation, no wheezes, rales or rhonchi, symmetric air entry  Heart: normal rate, regular rhythm, normal S1, S2, no murmurs, rubs, clicks or gallops  Abdomen: soft, nontender,  nondistended, no rigidity, rebound, or guarding.   Skin: No rashes or abnormal skin lesions, no apparent jaundice or bruising  Extremities: Full ROM of all extremities, peripheral pulses normal, no unilateral leg swelling or calf tenderness   Neurological:alert, oriented, normal speech, no new focal findings or movement disorder noted from baseline      BP Readings from Last 3 Encounters:   11/28/23 (!) 140/98   09/12/23 (!) 142/90   05/11/23 (!) 176/102     Wt Readings from Last 3 Encounters:   11/28/23 109.8 kg (242 lb 1 oz)   09/12/23 107.2 kg (236 lb 5.3 oz)   08/25/23 106.6 kg (235 lb)     BP (!) 140/98 (BP Location: Left arm, Patient Position: Sitting, BP Method: Large (Manual))   Pulse (!) 52   Temp 98.2 °F (36.8 °C) (Oral)   Ht 6' (1.829 m)   Wt 109.8 kg (242 lb 1 oz)   SpO2 98%   BMI 32.83 kg/m²       274}  Laboratory: I have reviewed old labs below:       274}    Lab Results   Component Value Date    WBC 3.96 09/12/2023    HGB 15.7 09/12/2023    HCT 49.3 09/12/2023    MCV 91 09/12/2023     09/12/2023     09/12/2023    K 4.0 09/12/2023     09/12/2023    CALCIUM 9.4 09/12/2023    CO2 23 09/12/2023    GLU 76 09/12/2023    BUN 12 09/12/2023    CREATININE 1.1 09/12/2023    EGFRNORACEVR >60.0 09/12/2023    ANIONGAP 11 09/12/2023    PROT 7.6 09/12/2023    ALBUMIN 4.2 09/12/2023    BILITOT 0.5 09/12/2023    ALKPHOS 57 09/12/2023    ALT 26 09/12/2023    AST 19 09/12/2023    INR 1.0 03/03/2023    CHOL 203 (H) 12/09/2020    TRIG 220 (H) 12/09/2020    HDL 31 (L) 12/09/2020    LDLCALC 128.0 12/09/2020    TSH 1.531 05/05/2015      Lab reviewed by me: Particular labs of significance that I will monitor, workup, or treat to improve are mentioned below in diagnostic impression remarks.    Imaging/EKG: I have reviewed the pertinent results and my findings are noted in remarks.     274}    CC:   Chief Complaint   Patient presents with    Pre-op Exam           274}    Assessment/Plan  Shelly Galloway  Jagdeep Dimas is a 48 y.o. male who presents to clinic with:  1. Preop examination    2. Subacute ethmoidal sinusitis          274}  Diagnostic Impression Remarks       48 y.o. male who  has a past medical history of Allergy, Anxiety, Hypertension, Migraine headache, and Migraine syndrome. presents to clinic today for preop exam    This is the extent of this pleasant patient's concerns at this present time. He did not feel chest pain upon exertion, dyspnea, nausea, vomiting, diaphoresis, or syncope. No pleuritic chest pain, unilateral leg swelling, calf tenderness, or calf pain. Negative for unintentional weight loss night sweats, hematuria, and fevers. Shelly will return to clinic in a few months for further workup and reassessment or sooner as needed. He was instructed to call the clinic or go to the emergency department or urgent care immediately if his symptoms do not improve, worsens, or if any new symptoms develop. As we discussed that symptoms could worsen over the next 24 hours he was advised that if any increased swelling, pain, or numbness arise to go immediately to the ED. Patient knows to call any time if an emergency arises. Shared decision making occurred and he verbalized understanding in agreement with this plan. I discussed imaging findings, diagnosis, possibilities, treatment options, medications, risks, and benefits. He had many questions regarding the options and long-term effects. All questions were answered. He expressed understanding after counseling regarding the diagnosis and recommendations. He was capable and demonstrated competence with understanding of these options. Shared decision making was performed resulting in him choosing the current treatment plan. Patient handout was given with instructions and recommendations. Advised the patient that if they become pregnant to alert us immediately to assess for medication changes. Having a healthy weight can decrease the risk of 13 cancers and is  an important goal. I also discussed the importance of close follow up to discuss labs, change or modify his medications if needed, monitor side effects, and further evaluation of medical problems.     Additional workup planned: see labs ordered below.    See below for labs and meds ordered with associated diagnosis      1. Preop examination    2. Subacute ethmoidal sinusitis  - methylPREDNISolone acetate injection 40 mg    Pre-operative evaluation with risk assessment              -           Scheduled for foot surgery on 12/4/23 by Dr. Hope   - He is on antiplatelets/anticoagulation. Hold aspirin prior to surgery per surgeon    - No h/o blood dyscrasias or previous reaction to anesthesia   - He is not a smoker.   - He has no prior h/o HLD/CAD w/ either MI or CVA. ASCVD:The 10-year ASCVD risk score (Rosalie COMER, et al., 2019) is: 9.8%    Values used to calculate the score:      Age: 48 years      Sex: Male      Is Non- : Yes      Diabetic: No      Tobacco smoker: No      Systolic Blood Pressure: 140 mmHg      Is BP treated: Yes      HDL Cholesterol: 31 mg/dL      Total Cholesterol: 203 mg/dL  - He has no prior h/o DM. Last HgbA1C:~ none on file  - He has no prior h/o thyroid disease. TSH: wnl (2015)   - He has prior h/o HTN. BP: 140/98  continue losartan 25 and metoprolol 25  - reports normally better controlled, high 2/2 anxiety around nasal congestion   - He has no prior h/o COPD/Asthma/ELISEO/OHS. Does not use CPAP. No change from baseline function.   - He has no prior h/o HF. Echo: EF 65% (1/20/2022)  - BMI: 32.83              -           The patient is medically optimized with a low to moderate risk to proceed with (per ACC/AHA reg-operative guidelines) a moderate risk surgery.   - Please call our office for any additional questions or concerns.  Form filled and faxed as directed     EKG showing sinus bradycardia     RTC for est care or PRN     Negar Strong MD, [facility]   Family  Medicine Physician  11/28/2023

## 2023-11-28 NOTE — PROGRESS NOTES
Identified name and . Administered 40 mg methylprednisolone, IM. Patient tolerated well, aseptic technique maintained. Pain scale 0/10 with injection. No residual bleeding at insertion site noted.

## 2024-01-08 ENCOUNTER — HOSPITAL ENCOUNTER (EMERGENCY)
Facility: HOSPITAL | Age: 49
Discharge: HOME | End: 2024-01-08
Attending: EMERGENCY MEDICINE

## 2024-01-08 VITALS
HEART RATE: 65 BPM | DIASTOLIC BLOOD PRESSURE: 89 MMHG | SYSTOLIC BLOOD PRESSURE: 135 MMHG | OXYGEN SATURATION: 99 % | TEMPERATURE: 99 F | WEIGHT: 235 LBS | BODY MASS INDEX: 31.87 KG/M2 | RESPIRATION RATE: 18 BRPM

## 2024-01-08 DIAGNOSIS — G89.18 POST-OP PAIN: ICD-10-CM

## 2024-01-08 PROCEDURE — 99283 EMERGENCY DEPT VISIT LOW MDM: CPT

## 2024-01-08 RX ORDER — CLINDAMYCIN HYDROCHLORIDE 150 MG/1
300 CAPSULE ORAL 4 TIMES DAILY
Qty: 56 CAPSULE | Refills: 0 | Status: SHIPPED | OUTPATIENT
Start: 2024-01-08 | End: 2024-01-15

## 2024-01-08 NOTE — ED PROVIDER NOTES
Encounter Date: 1/8/2024       History     Chief Complaint   Patient presents with    Wound Check     Surgical wound to right lateral foot.      Patient presents emergency department with reported pain to the surgical site off the lateral aspect of the right foot he status post removal of screw hardware placed at the base of his 5th metatarsal bone states the screw was at an odd angle requiring removal states he has had some persistent swelling and redness to that area and has been walking on the foot they noticed some discomfort over last few days and given the increased discomfort he came to emergency department for evaluation he is concerned he may be getting infected no fever the area is somewhat erythematous it is swollen he states the erythema in the swelling is as it is looks since the surgery there has been no drainage is been no fever there is no new trauma to the area        Review of patient's allergies indicates:   Allergen Reactions    Ace inhibitors Other (See Comments)     cough    Tylenol [acetaminophen] Other (See Comments)     cough     Past Medical History:   Diagnosis Date    Allergy     Anxiety     Hypertension     Migraine headache     Migraine syndrome      Past Surgical History:   Procedure Laterality Date    BACK SURGERY      FRACTURE SURGERY      LARYNGOSCOPY N/A 8/18/2022    Procedure: Suspension microlaryngoscopy with KTP laser assisted excision of lesion;  Surgeon: José Wiggins MD;  Location: [facility] OR [address] [place] [address];  Service: ENT;  Laterality: N/A;  Microscope, telescopes, tower, microinstruments, KTP laser; laser confirmed on 8/18 at 10am #[id number] (cL8/12)    LEFT HEART CATHETERIZATION Left 9/19/2019    Procedure: CATHETERIZATION, HEART, LEFT  (RIGHT RADIAL ACCESS);  Surgeon: Ronnie Bailey MD;  Location: [facility] CATH/EP LAB;  Service: Cardiology;  Laterality: Left;    LUMBAR DISC SURGERY Bilateral     L4- L5 , 2016 April,  Trans    REPAIR,TENDON,DISTAL PROXIMAL Left 3/23/2023     Procedure: REPAIR,TENDON,DISTAL PROXIMAL;  Surgeon: Cornell Chan MD;  Location: [facility] OR;  Service: Orthopedics;  Laterality: Left;    VOCAL FOLD LESION EXCISION  2008     Family History   Problem Relation Age of Onset    Allergic rhinitis Mother     Hypertension Mother     Heart disease Mother     Stroke Mother     Heart disease Father 49    Cancer Maternal Aunt         breast    Heart disease Maternal Aunt     Angioedema Neg Hx     Asthma Neg Hx     Atopy Neg Hx     Eczema Neg Hx     Immunodeficiency Neg Hx     Urticaria Neg Hx      Social History     Tobacco Use    Smoking status: Never    Smokeless tobacco: Never   Substance Use Topics    Alcohol use: No    Drug use: No     Review of Systems   Musculoskeletal:  Positive for arthralgias.   Skin:  Positive for wound. Negative for color change.   All other systems reviewed and are negative.      Physical Exam     Initial Vitals [01/08/24 0215]   BP Pulse Resp Temp SpO2   (!) 140/90 66 17 98.5 °F (36.9 °C) 96 %      MAP       --         Physical Exam    Constitutional: He appears well-developed and well-nourished. No distress.   HENT:   Head: Normocephalic and atraumatic.   Cardiovascular:  Normal rate, regular rhythm, S1 normal, S2 normal and intact distal pulses.           Pulmonary/Chest: He has wheezes.   Abdominal: Abdomen is soft. Bowel sounds are normal. There is no abdominal tenderness.   Musculoskeletal:         General: Tenderness present. Normal range of motion.      Comments: Swelling to the lateral aspect of the right foot with tenderness mild erythema no fluctuance no drainage incision well approximated     Neurological: He is alert and oriented to person, place, and time. GCS eye subscore is 4. GCS verbal subscore is 5. GCS motor subscore is 6.   Skin: Skin is warm and dry. Capillary refill takes less than 2 seconds. No rash and no abscess noted. There is erythema.   Psychiatric: He has a normal mood and affect. His behavior is normal.         ED  Course   Procedures  Labs Reviewed - No data to display       Imaging Results              X-Ray Foot Complete Right (In process)                      Medications - No data to display  Medical Decision Making  Radiographs show no acute abnormalities compared to previous radiographs I am concerned given the redness of the area the patient says that this is been persistent since the surgery given the tenderness to the area will treat with clindamycin recommend he follow up with the surgeon in the morning return to the ER for any worsened symptoms or new symptoms including fever or drainage from the area    Amount and/or Complexity of Data Reviewed  Radiology: ordered. Decision-making details documented in ED Course.    Risk  Prescription drug management.                                      Clinical Impression:  Final diagnoses:  [G89.18] Post-op pain          ED Disposition Condition    Discharge Stable          ED Prescriptions       Medication Sig Dispense Start Date End Date Auth. Provider    clindamycin (CLEOCIN) 150 MG capsule Take 2 capsules (300 mg total) by mouth 4 (four) times daily. for 7 days 56 capsule 1/8/2024 1/15/2024 Salo Handy MD          Follow-up Information       Follow up With Specialties Details Why Contact Info    Negar Strong MD Family Medicine  for re-examination of your symptoms [address] [facility] [address]  [phone]               Salo Handy MD  01/08/24 5352

## 2024-03-13 ENCOUNTER — OFFICE VISIT (OUTPATIENT)
Dept: FAMILY MEDICINE | Facility: CLINIC | Age: 49
End: 2024-03-13
Payer: COMMERCIAL

## 2024-03-13 ENCOUNTER — TELEPHONE (OUTPATIENT)
Dept: ORTHOPEDICS | Facility: CLINIC | Age: 49
End: 2024-03-13

## 2024-03-13 VITALS
DIASTOLIC BLOOD PRESSURE: 92 MMHG | SYSTOLIC BLOOD PRESSURE: 138 MMHG | OXYGEN SATURATION: 97 % | BODY MASS INDEX: 31.92 KG/M2 | HEART RATE: 69 BPM | WEIGHT: 235.69 LBS | RESPIRATION RATE: 18 BRPM | HEIGHT: 72 IN

## 2024-03-13 DIAGNOSIS — G89.29 CHRONIC MIDLINE LOW BACK PAIN WITH RIGHT-SIDED SCIATICA: Primary | ICD-10-CM

## 2024-03-13 DIAGNOSIS — M54.41 CHRONIC MIDLINE LOW BACK PAIN WITH RIGHT-SIDED SCIATICA: Primary | ICD-10-CM

## 2024-03-13 DIAGNOSIS — M25.521 RIGHT ELBOW PAIN: ICD-10-CM

## 2024-03-13 PROCEDURE — 99999 PR PBB SHADOW E&M-EST. PATIENT-LVL V: CPT | Mod: PBBFAC,,, | Performed by: STUDENT IN AN ORGANIZED HEALTH CARE EDUCATION/TRAINING PROGRAM

## 2024-03-13 PROCEDURE — 99214 OFFICE O/P EST MOD 30 MIN: CPT | Mod: S$GLB,,, | Performed by: STUDENT IN AN ORGANIZED HEALTH CARE EDUCATION/TRAINING PROGRAM

## 2024-03-13 RX ORDER — UBROGEPANT 50 MG/1
TABLET ORAL
COMMUNITY
End: 2024-04-19

## 2024-03-13 NOTE — PROGRESS NOTES
[facility] Note    Subjective:  Chief Complaint:   Chief Complaint   Patient presents with    Arm Pain     nac    Back Pain       History of Present Illness:  Shelly is here for arm and back pain problem visit  Patient previously seen for preop - foot surgery    Right arm pain x 2 months   8/10 aching/cramping, worsening   Lifted heavy bucket, may have felt something pull in elbow   Unable to fully extend 2/2 discomfort and pulling sensation     Back Pain Description:  Length: pain is chronic pain. Length > 2 year(s)  Any past, recent injury, falls: denies   Intensity:  AVERAGE  Pain  8/10.   Location: lumbar midline   Radiation: Positive to right side and leg.   Timing : intermittent pain   QUALITY:  sharp/shooting/   Right leg falling asleep x 1 year  Positive right leg weakness.   Worsening factors: activity , prolonged sitting/standing   Alleviating factors: lying down   Current medications: none  Failed medications: tylenol/ibuprofen   Prior procedures. Surgery in 2017  Imaging: none recent   PT/OT: none    Patient denies weight loss, fever, chills, drenching night sweats, ripping or tearing sensation, dysuria or flank pain, hx of malignancy,  tuberculosis, immunosuppression,  recent infections,  renal stones,  hx of aortic pathology, IVDU. Reports no inciting incident, no falls, no trauma, no any bladder/bowel incontinence, no saddle anesthesia, and no leg paresthesias.      Allergies:  Review of patient's allergies indicates:   Allergen Reactions    Ace inhibitors Other (See Comments)     cough    Tramadol Itching    Tylenol [acetaminophen] Other (See Comments)     cough       Home Medications:  Current Outpatient Medications on File Prior to Visit   Medication Sig    aspirin (ECOTRIN) 81 MG EC tablet Take 81 mg by mouth once daily.    losartan (COZAAR) 25 MG tablet Take 1 tablet (25 mg total) by mouth once daily.    metoprolol succinate (TOPROL-XL) 25 MG 24 hr tablet Take 1 tablet (25 mg  total) by mouth once daily.    ondansetron (ZOFRAN-ODT) 4 MG TbDL Take 1 tablet (4 mg total) by mouth every 8 (eight) hours as needed (for nausea).    pantoprazole (PROTONIX) 40 MG tablet Take 1 tablet (40 mg total) by mouth once daily.    ibuprofen (ADVIL,MOTRIN) 600 MG tablet Take 1 tablet (600 mg total) by mouth 3 (three) times daily. (Patient not taking: Reported on 3/13/2024)    oxyCODONE-acetaminophen (PERCOCET) 5-325 mg per tablet Take 1 tablet by mouth every 6 (six) hours as needed for Pain. (Patient not taking: Reported on 3/13/2024)    ubrogepant (UBRELVY) 50 mg tablet 1 tablet may take second dose at least 2 hours after first dose as needed Orally Once a day for 30 day(s)     Current Facility-Administered Medications on File Prior to Visit   Medication    0.9%  NaCl infusion       Past Medical History:   Diagnosis Date    Allergy     Anxiety     Hypertension     Migraine headache     Migraine syndrome      Past Surgical History:   Procedure Laterality Date    BACK SURGERY      FRACTURE SURGERY      LARYNGOSCOPY N/A 8/18/2022    Procedure: Suspension microlaryngoscopy with KTP laser assisted excision of lesion;  Surgeon: José Wiggins MD;  Location: [address];  Service: ENT;  Laterality: N/A;  Microscope, telescopes, tower, microinstruments, KTP laser; laser confirmed on 8/18 at 10am #[id number] (cL8/12)    LEFT HEART CATHETERIZATION Left 9/19/2019    Procedure: CATHETERIZATION, HEART, LEFT  (RIGHT RADIAL ACCESS);  Surgeon: Ronnie Bailey MD;  Location: [facility] CATH/EP [facility];  Service: Cardiology;  Laterality: Left;    LUMBAR DISC SURGERY Bilateral     L4- L5 , 2016 April, Dr Barahona    REPAIR,TENDON,DISTAL PROXIMAL Left 3/23/2023    Procedure: REPAIR,TENDON,DISTAL PROXIMAL;  Surgeon: Cornell Chan MD;  Location: [facility] OR;  Service: Orthopedics;  Laterality: Left;    VOCAL FOLD LESION EXCISION  2008     Family History   Problem Relation Age of Onset    Allergic rhinitis Mother     Hypertension  Mother     Heart disease Mother     Stroke Mother     Heart disease Father 49    Cancer Maternal Aunt         breast    Heart disease Maternal Aunt     Angioedema Neg Hx     Asthma Neg Hx     Atopy Neg Hx     Eczema Neg Hx     Immunodeficiency Neg Hx     Urticaria Neg Hx      Social History     Tobacco Use    Smoking status: Never    Smokeless tobacco: Never   Substance Use Topics    Alcohol use: No    Drug use: No            The patient's past medical history, surgical history, social history, family history, allergies and medications have been reviewed.    Review of Systems     10 point review of systems was conducted and only the pertinent positives and pertinent negatives are noted above in the HPI section.    Physical Examination  General appearance: alert, cooperative, no distress  HEENT: normocephalic, atraumatic, PERRLA  Neck: trachea midline,  no neck stiffness  Lungs: clear to auscultation, no wheezes, rales or rhonchi, symmetric air entry  Heart: normal rate, regular rhythm, normal S1, S2, no murmurs, rubs, clicks or gallops  Abdomen: soft, nontender, nondistended, no rigidity, rebound, or guarding.   Back: no point tenderness over spine, right side posterior iliac crest tender to palpation   Skin: No rashes or abnormal skin lesions, no apparent jaundice or bruising  Extremities: peripheral pulses normal, no unilateral leg swelling or calf tenderness , tenderness to right distal biceps tendon , negative megan sign   Neurological:alert, oriented, normal speech, no new focal findings or movement disorder noted from baseline      BP Readings from Last 3 Encounters:   03/13/24 (!) 138/92   01/08/24 135/89   11/28/23 (!) 140/98     Wt Readings from Last 3 Encounters:   03/13/24 106.9 kg (235 lb 10.8 oz)   01/08/24 106.6 kg (235 lb)   11/28/23 109.8 kg (242 lb 1 oz)     BP (!) 138/92 (BP Location: Left arm, Patient Position: Sitting, BP Method: Large (Manual))   Pulse 69   Resp 18   Ht 6' (1.829 m)   Wt  106.9 kg (235 lb 10.8 oz)   SpO2 97%   BMI 31.96 kg/m²       274}  Laboratory: I have reviewed old labs below:       274}    Lab Results   Component Value Date    WBC 3.96 09/12/2023    HGB 15.7 09/12/2023    HCT 49.3 09/12/2023    MCV 91 09/12/2023     09/12/2023     09/12/2023    K 4.0 09/12/2023     09/12/2023    CALCIUM 9.4 09/12/2023    CO2 23 09/12/2023    GLU 76 09/12/2023    BUN 12 09/12/2023    CREATININE 1.1 09/12/2023    EGFRNORACEVR >60.0 09/12/2023    ANIONGAP 11 09/12/2023    PROT 7.6 09/12/2023    ALBUMIN 4.2 09/12/2023    BILITOT 0.5 09/12/2023    ALKPHOS 57 09/12/2023    ALT 26 09/12/2023    AST 19 09/12/2023    INR 1.0 03/03/2023    CHOL 203 (H) 12/09/2020    TRIG 220 (H) 12/09/2020    HDL 31 (L) 12/09/2020    LDLCALC 128.0 12/09/2020    TSH 1.531 05/05/2015      Lab reviewed by me: Particular labs of significance that I will monitor, workup, or treat to improve are mentioned below in diagnostic impression remarks.    Imaging/EKG: I have reviewed the pertinent results and my findings are noted in remarks.     274}    CC:   Chief Complaint   Patient presents with    Arm Pain     nac    Back Pain           274}    Assessment/Plan  Shelly Gannon Jr. is a 48 y.o. male who presents to clinic with:  1. Chronic midline low back pain with right-sided sciatica    2. Right elbow pain          274}  Diagnostic Impression Remarks       48 y.o. male who presents to clinic today for back and arm pain problem visit     This is the extent of this pleasant patient's concerns at this present time.  I also discussed the importance of close follow up to discuss labs, change or modify his medications if needed, monitor side effects, and further evaluation of medical problems.     Additional workup planned: see labs ordered below.    See below for labs and meds ordered with associated diagnosis      1. Chronic midline low back pain with right-sided sciatica  - Ambulatory referral/consult to  Spine Surgery; Future  - X-Ray Lumbar 4-5 View including Bending Views; Future    2. Right elbow pain  - X-Ray Elbow Complete 3 views Right; Future  - Ambulatory referral/consult to Orthopedics; Future      At this point in time the patient is considered a low risk as determined by his history, physical, and the absence of red flags. I have a low suspicion of cord compression since he does not have saddle anesthesia, bowel or bladder incontinence, abnormal reflexes, weakness, or numbness in his arms or legs. Infection is low on my differential since he denies fever, chills, night sweats, IV drug use, dysuria, flank pain, and recent surgery. I did not appreciate bony tenderness, CVA tenderness, or an infectious source. My suspicion for cancer is low since he did not endorse fever, night sweats, or unintentional weight loss. Aortic dissection is low on the differential since he denies a ripping or tearing sensation chest pain, chest pain that radiates to the back, and sudden severe abdominal pain. On my exam there was no pulse deficit or pulsatile abdominal mass. Based on the history and examination, I feel that the likelihood of a high risk condition requiring emergent imaging is so low that no further testing in this regard is warranted at this point in time.     We discussed at length the warning symptoms in order for the patient to return to clinic and/or present to the ED if unable to reach the clinic within a timely manner including but not limited to: increased pain, change in gait, change in sensation around the rectum or perineum, bowel or bladder issues/incontinent, urinary retention, and fever. Via teach back mechanism, the patient voiced understanding of the aforementioned recommendations/instructions.    Home Care:  You may need to stay in bed the first few days. But, as soon as possible, begin sitting or walking to avoid problems with prolonged bed rest (muscle weakness, worsening back stiffness and  pain, blood clots in the legs).  When in bed, try to find a position of comfort. A firm mattress is best. Try lying flat on your back with pillows under your knees. You can also try lying on your side with your knees bent up towards your chest and a pillow between your knees.  Avoid prolonged sitting. This puts more stress on the lower back than standing or walking.  During the first two days after injury, apply an ICE PACK to the painful area for 20 minutes every 2-4 hours. This will reduce swelling and pain. HEAT (hot shower, hot bath or heating pad) works well for muscle spasm. Do not sleep with heating pad. You can start with ice, then switch to heat after two days. Some patients feel best alternating ice and heat treatments. Use the one method that feels the best to you.  You may use acetaminophen (Tylenol) or ibuprofen (Motrin, Advil) to control pain, unless another pain medicine was prescribed. [NOTE: If you have chronic liver or kidney disease or ever had a stomach ulcer or GI bleeding, talk with your doctor before using these medicines.]  Be aware of safe lifting methods and do not lift anything over 15 pounds until all the pain is gone    RTC PRN     Negar Strong MD, [facility]   Family Medicine Physician  03/13/2024            Rx3: Low Back Pain FAQs    What is low back pain?    Low back pain can occur in any part of the lower back: the middle, right side, and/or left side, and its sometimes possible for the pain to radiate down into the buttocks region or even further into the legs. Low back pain can result from multiple factors, including sudden or abnormal movement, overuse of the surrounding muscles, tight or weak muscles, poor posture, and improper form while moving or lifting objects. But most often the true cause of low back pain is unknown. Pain can come on suddenly or gradually, can last a short time or long time, can range from mild to severe, and typically gets worse when you sit or stand for  long periods of time. Sometimes low back pain originates from a source not related to your back at all.    Is there a test for low back pain?    Your healthcare provider will ask you about your symptoms and will perform a physical exam. Depending on your age and your specific symptoms, your doctor may or may not order imaging tests (X-ray, MRI, CT scan, or bone scan) to help with the diagnosis.    How is low back pain treated?    The mainstay of low back pain treatment is through physical rehabilitation: performing exercises and stretches to increase the strength, endurance, and flexibility of the muscles of back, hip, and legs.    The Rx3 low back pain program organizes the exercises by phases based on how far along you are in the recovery process. Unless your healthcare provider tells you otherwise, start with Phase 1. Advance to the next phase no sooner than 3 weeks and only when you feel you have mastered the exercises and can perform them with minimal effort and discomfort. If you have pain during or after the Phase 1 exercises, or if you have questions about when to go on to the next phase, check with your healthcare provider.    Each phase includes leg and core exercises, a cardio component, and stretches. Make sure you always do all the parts of each phase.    How long is this low back rehabilitation program?    Complete the program at least 3 days a week, increasing to 5 days a week as it becomes easier. Each exercise session should take about 20 minutes, plus the cardio component, and less than 15 minutes for stretching. You will need very little equipment for this program. Each phase takes at least 3 weeks, so the program will take you at least 9 weeks total, but the actual time you need to recover will depend on your specific injury condition.

## 2024-03-13 NOTE — TELEPHONE ENCOUNTER
Spoke with patient and advised him we had to cancel his appointment the we are not contracted with his ins plan att his time.

## 2024-04-19 ENCOUNTER — TELEPHONE (OUTPATIENT)
Dept: FAMILY MEDICINE | Facility: CLINIC | Age: 49
End: 2024-04-19

## 2024-04-19 ENCOUNTER — HOSPITAL ENCOUNTER (OUTPATIENT)
Dept: RADIOLOGY | Facility: CLINIC | Age: 49
Discharge: HOME | End: 2024-04-19
Attending: STUDENT IN AN ORGANIZED HEALTH CARE EDUCATION/TRAINING PROGRAM
Payer: MEDICAID

## 2024-04-19 ENCOUNTER — OFFICE VISIT (OUTPATIENT)
Dept: FAMILY MEDICINE | Facility: CLINIC | Age: 49
End: 2024-04-19
Payer: MEDICAID

## 2024-04-19 VITALS
DIASTOLIC BLOOD PRESSURE: 82 MMHG | TEMPERATURE: 98 F | BODY MASS INDEX: 32.28 KG/M2 | HEART RATE: 64 BPM | HEIGHT: 72 IN | OXYGEN SATURATION: 96 % | WEIGHT: 238.31 LBS | RESPIRATION RATE: 16 BRPM | SYSTOLIC BLOOD PRESSURE: 120 MMHG

## 2024-04-19 DIAGNOSIS — G89.29 CHRONIC MIDLINE LOW BACK PAIN WITH RIGHT-SIDED SCIATICA: Primary | ICD-10-CM

## 2024-04-19 DIAGNOSIS — M25.521 RIGHT ELBOW PAIN: ICD-10-CM

## 2024-04-19 DIAGNOSIS — G89.29 CHRONIC MIDLINE LOW BACK PAIN WITH RIGHT-SIDED SCIATICA: ICD-10-CM

## 2024-04-19 DIAGNOSIS — M54.41 CHRONIC MIDLINE LOW BACK PAIN WITH RIGHT-SIDED SCIATICA: Primary | ICD-10-CM

## 2024-04-19 DIAGNOSIS — I10 ESSENTIAL HYPERTENSION: ICD-10-CM

## 2024-04-19 DIAGNOSIS — M54.41 CHRONIC MIDLINE LOW BACK PAIN WITH RIGHT-SIDED SCIATICA: ICD-10-CM

## 2024-04-19 PROCEDURE — 3008F BODY MASS INDEX DOCD: CPT | Mod: CPTII,,,

## 2024-04-19 PROCEDURE — 72110 X-RAY EXAM L-2 SPINE 4/>VWS: CPT | Mod: TC,FY,PO

## 2024-04-19 PROCEDURE — 99214 OFFICE O/P EST MOD 30 MIN: CPT | Mod: PBBFAC,25,PO

## 2024-04-19 PROCEDURE — 3074F SYST BP LT 130 MM HG: CPT | Mod: CPTII,,,

## 2024-04-19 PROCEDURE — 99214 OFFICE O/P EST MOD 30 MIN: CPT | Mod: S$PBB,,,

## 2024-04-19 PROCEDURE — 73080 X-RAY EXAM OF ELBOW: CPT | Mod: TC,FY,PO,RT

## 2024-04-19 PROCEDURE — 99999 PR PBB SHADOW E&M-EST. PATIENT-LVL IV: CPT | Mod: PBBFAC,,,

## 2024-04-19 PROCEDURE — 1159F MED LIST DOCD IN RCRD: CPT | Mod: CPTII,,,

## 2024-04-19 PROCEDURE — 1160F RVW MEDS BY RX/DR IN RCRD: CPT | Mod: CPTII,,,

## 2024-04-19 PROCEDURE — 3079F DIAST BP 80-89 MM HG: CPT | Mod: CPTII,,,

## 2024-04-19 PROCEDURE — 73080 X-RAY EXAM OF ELBOW: CPT | Mod: 26,RT,S$GLB, | Performed by: RADIOLOGY

## 2024-04-19 PROCEDURE — 72110 X-RAY EXAM L-2 SPINE 4/>VWS: CPT | Mod: 26,,, | Performed by: STUDENT IN AN ORGANIZED HEALTH CARE EDUCATION/TRAINING PROGRAM

## 2024-04-19 RX ORDER — METHOCARBAMOL 750 MG/1
750 TABLET, FILM COATED ORAL 3 TIMES DAILY PRN
Qty: 80 TABLET | Refills: 0 | Status: SHIPPED | OUTPATIENT
Start: 2024-04-19 | End: 2024-04-29

## 2024-04-19 RX ORDER — METHYLPREDNISOLONE 4 MG/1
TABLET ORAL
Qty: 21 EACH | Refills: 0 | Status: SHIPPED | OUTPATIENT
Start: 2024-04-19 | End: 2024-05-10

## 2024-04-19 NOTE — PATIENT INSTRUCTIONS
Parrish Bravo,     If you are due for any health screening(s) below please notify me so we can arrange them to be ordered and scheduled to maintain your health. Most healthy patients complete it. Don't lose out on improving your health.     Tests to Keep You Healthy    Colon Cancer Screening: DUE  Last Blood Pressure <= 139/89 (4/19/2024): NO         Colon Cancer Screening    Of cancers affecting both men and women, colorectal cancer is the third leading cancer killer in the [place]. But it doesnt have to be. Screening can prevent colorectal cancer or find it at an early stage when treatment often leads to a cure.    A colonoscopy is the preferred test for detecting colon cancer. It is needed only once every 10 years if results are negative. While sedated, a flexible, lighted tube with a tiny camera is inserted into the rectum and advanced through the colon to look for cancers. An alternative screening test that is used at home and returned to the lab may also be used. It detects hidden blood in bowel movements which could indicate cancer in the colon. If results are positive, you will need a colonoscopy to determine if the blood is a sign of cancer. This type of follow up (diagnostic) colonoscopy usually requires additional copays as required by your insurance provider. Please contact your PCP if you have any questions.                      CT Surgery

## 2024-04-19 NOTE — PROGRESS NOTES
Subjective:       Patient ID: Shelly Gannon Jr. is a 49 y.o. male.    Chief Complaint: Arm Pain and Back Pain    Presents to the clinic w/ complaints similar to his last visit with Dr. Strong. See her HPI from previous visit below.     Arm Pain     Back Pain      Right arm pain x 2 months   8/10 aching/cramping, worsening   Lifted heavy bucket, may have felt something pull in elbow   Unable to fully extend 2/2 discomfort and pulling sensation      Back Pain Description:  Length: pain is chronic pain. Length > 2 year(s)  Any past, recent injury, falls: denies   Intensity:  AVERAGE  Pain  8/10.   Location: lumbar midline   Radiation: Positive to right side and leg.   Timing : intermittent pain   QUALITY:  sharp/shooting/   Right leg falling asleep x 1 year  Positive right leg weakness.   Worsening factors: activity , prolonged sitting/standing   Alleviating factors: lying down   Current medications: none  Failed medications: tylenol/ibuprofen   Prior procedures. Surgery in 2017  Imaging: none recent   PT/OT: none     Patient denies weight loss, fever, chills, drenching night sweats, ripping or tearing sensation, dysuria or flank pain, hx of malignancy,  tuberculosis, immunosuppression,  recent infections,  renal stones,  hx of aortic pathology, IVDU. Reports no inciting incident, no falls, no trauma, no any bladder/bowel incontinence, no saddle anesthesia, and no leg paresthesias.    Past Medical History:   Diagnosis Date    Allergy     Anxiety     Hypertension     Migraine headache     Migraine syndrome        Review of patient's allergies indicates:   Allergen Reactions    Ace inhibitors Other (See Comments)     cough    Tramadol Itching    Tylenol [acetaminophen] Other (See Comments)     cough         Current Outpatient Medications:     losartan (COZAAR) 25 MG tablet, Take 1 tablet (25 mg total) by mouth once daily., Disp: 90 tablet, Rfl: 3    metoprolol succinate (TOPROL-XL) 25 MG 24 hr tablet, Take 1  tablet (25 mg total) by mouth once daily., Disp: 90 tablet, Rfl: 3    pantoprazole (PROTONIX) 40 MG tablet, Take 1 tablet (40 mg total) by mouth once daily., Disp: 30 tablet, Rfl: 11    methocarbamoL (ROBAXIN) 750 MG Tab, Take 1 tablet (750 mg total) by mouth 3 (three) times daily as needed (back pain)., Disp: 80 tablet, Rfl: 0    methylPREDNISolone (MEDROL DOSEPACK) 4 mg tablet, use as directed, Disp: 21 each, Rfl: 0  No current facility-administered medications for this visit.    Facility-Administered Medications Ordered in Other Visits:     0.9%  NaCl infusion, , Intravenous, Continuous, Ronnie Bailey MD, Last Rate: 75 mL/hr at 09/19/19 0930, New Bag at 09/19/19 0930    Review of Systems   Musculoskeletal:  Positive for arthralgias, back pain and myalgias.       Objective:      /82 (BP Location: Left arm, Patient Position: Sitting, BP Method: Large (Manual))   Pulse 64   Temp 97.9 °F (36.6 °C) (Oral)   Resp 16   Ht 6' (1.829 m)   Wt 108.1 kg (238 lb 5.1 oz)   SpO2 96%   BMI 32.32 kg/m²   Physical Exam  Vitals reviewed.   Constitutional:       General: He is not in acute distress.     Appearance: Normal appearance. He is obese. He is not ill-appearing, toxic-appearing or diaphoretic.   HENT:      Head: Normocephalic.      Right Ear: External ear normal.      Left Ear: External ear normal.      Nose: Nose normal. No congestion or rhinorrhea.      Mouth/Throat:      Mouth: Mucous membranes are moist.      Pharynx: Oropharynx is clear.   Eyes:      General: No scleral icterus.        Right eye: No discharge.         Left eye: No discharge.      Extraocular Movements: Extraocular movements intact.      Conjunctiva/sclera: Conjunctivae normal.   Cardiovascular:      Rate and Rhythm: Normal rate and regular rhythm.      Pulses: Normal pulses.      Heart sounds: Normal heart sounds. No murmur heard.     No friction rub. No gallop.   Pulmonary:      Effort: Pulmonary effort is normal. No respiratory  distress.      Breath sounds: Normal breath sounds. No wheezing, rhonchi or rales.   Chest:      Chest wall: No tenderness.   Musculoskeletal:         General: Tenderness (R forearm, midline back and R side lumbar region) present. No swelling, deformity or signs of injury. Normal range of motion.      Cervical back: Normal range of motion.      Right lower leg: No edema.      Left lower leg: No edema.   Skin:     General: Skin is warm and dry.      Capillary Refill: Capillary refill takes less than 2 seconds.      Coloration: Skin is not jaundiced.      Findings: No bruising, erythema, lesion or rash.   Neurological:      Mental Status: He is alert and oriented to person, place, and time.      Gait: Gait normal.   Psychiatric:         Mood and Affect: Mood normal.         Behavior: Behavior normal.         Thought Content: Thought content normal.         Judgment: Judgment normal.         Assessment:       1. Chronic midline low back pain with right-sided sciatica    2. Right elbow pain    3. Essential hypertension        Plan:       Chronic midline low back pain with right-sided sciatica  -     methylPREDNISolone (MEDROL DOSEPACK) 4 mg tablet; use as directed  Dispense: 21 each; Refill: 0  -     methocarbamoL (ROBAXIN) 750 MG Tab; Take 1 tablet (750 mg total) by mouth 3 (three) times daily as needed (back pain).  Dispense: 80 tablet; Refill: 0  -     Ambulatory referral/consult to [facility]; Future; Expected date: 04/26/2024        -    Xrays previously ordered by Dr. Strong today. If he requires MRI will need open.         -    Will send message to Dr. Chan staff to see if we can get him scheduled for follow up as he has seen in the past for arm issue.     Right elbow pain  -     methylPREDNISolone (MEDROL DOSEPACK) 4 mg tablet; use as directed  Dispense: 21 each; Refill: 0    Essential hypertension        -    Stable. Continue meds. Will continue to monitor.                  Vick Monk  RAEANN  Family Medicine Physician Assistant       Future Appointments       Date Specialty Appt Notes    4/19/2024 Radiology .    4/19/2024 Radiology .               I spent a total of 20 minutes on the day of the visit.This includes face to face time and non-face to face time preparing to see the patient (eg, review of tests), obtaining and/or reviewing separately obtained history, documenting clinical information in the electronic or other health record, independently interpreting results and communicating results to the patient/family/caregiver, or care coordinator.      We have addressed [4] Moderate: 1 or more chronic illnesses with exacerbation, progression, or side effects of treatment / 2 or more stable chronic illnesses / 1 undiagnosed new problem with uncertain prognosis / 1 acute illness with systemic symptoms / 1 acute complicated injury  The complexity of the data reviewed and analyzed for this visit was [2] Minimal or None  The risk of complications and/or morbidity or mortality are [4] Moderate risk (I.e. prescription drug management / decision regarding minor surgery with identified pt or procedure risk factors / decision regarding elective major surgery without identified pt or procedure risk factors / diagnosis or treatment significantly limited by social determinants of health)   The level of Medical Decision Making for this visit is [4] Moderate

## 2024-04-19 NOTE — TELEPHONE ENCOUNTER
Good morning. Is there anyway that we can get Mr. Gannon scheduled for follow up with Dr. Chan. He is already established. My staff attempted to schedule but they had difficulty getting an appointment in a reasonable amount of time. Thank you.

## 2024-04-22 ENCOUNTER — PATIENT MESSAGE (OUTPATIENT)
Dept: ADMINISTRATIVE | Facility: HOSPITAL | Age: 49
End: 2024-04-22
Payer: MEDICAID

## 2024-04-23 DIAGNOSIS — Z12.11 SCREENING FOR COLON CANCER: ICD-10-CM

## 2024-04-25 ENCOUNTER — TELEPHONE (OUTPATIENT)
Dept: ORTHOPEDICS | Facility: CLINIC | Age: 49
End: 2024-04-25
Payer: MEDICAID

## 2024-04-25 NOTE — TELEPHONE ENCOUNTER
----- Message from Ronnie Donald sent at 4/25/2024  1:12 PM CDT -----  Patient would like a callback regarding scheduling for his right elbow pain.    -- Medicaid     [phone]

## 2024-04-30 ENCOUNTER — OFFICE VISIT (OUTPATIENT)
Dept: ORTHOPEDICS | Facility: CLINIC | Age: 49
End: 2024-04-30
Payer: MEDICAID

## 2024-04-30 VITALS — WEIGHT: 238.31 LBS | BODY MASS INDEX: 32.28 KG/M2 | HEIGHT: 72 IN

## 2024-04-30 DIAGNOSIS — M75.21 BICEPS TENDINITIS, RIGHT: Primary | ICD-10-CM

## 2024-04-30 PROCEDURE — 99212 OFFICE O/P EST SF 10 MIN: CPT | Mod: PBBFAC,PO | Performed by: ORTHOPAEDIC SURGERY

## 2024-04-30 PROCEDURE — 99214 OFFICE O/P EST MOD 30 MIN: CPT | Mod: S$PBB,,, | Performed by: ORTHOPAEDIC SURGERY

## 2024-04-30 PROCEDURE — 99999 PR PBB SHADOW E&M-EST. PATIENT-LVL II: CPT | Mod: PBBFAC,,, | Performed by: ORTHOPAEDIC SURGERY

## 2024-04-30 PROCEDURE — 3008F BODY MASS INDEX DOCD: CPT | Mod: CPTII,,, | Performed by: ORTHOPAEDIC SURGERY

## 2024-04-30 RX ORDER — MELOXICAM 15 MG/1
15 TABLET ORAL DAILY
Qty: 30 TABLET | Refills: 3 | Status: SHIPPED | OUTPATIENT
Start: 2024-04-30 | End: 2024-05-21

## 2024-04-30 NOTE — PROGRESS NOTES
Patient ID: Shelly Gannon Jr. is a 49 y.o. male    Chief Complaint:   Chief Complaint   Patient presents with    Right Elbow - Pain       History of Present Illness:    49-year-old male here for evaluation of right elbow pain.  Treated by me in the past for right triceps rupture.  Recovering well from this.  Over the past month or so he has had a few bouts of right elbow pain mostly in the proximal forearm.  This started after a fall and he went to grab a bar.  Has had a couple of episodes where he felt pain at night that woke him up out of a sleep.  No pain down in the fingertips or hand.  Most pain seems to be isolated to the distal biceps and elbow.    PAST MEDICAL HISTORY:   Past Medical History:   Diagnosis Date    Allergy     Anxiety     Hypertension     Migraine headache     Migraine syndrome      PAST SURGICAL HISTORY:   Past Surgical History:   Procedure Laterality Date    BACK SURGERY      FRACTURE SURGERY      LARYNGOSCOPY N/A 8/18/2022    Procedure: Suspension microlaryngoscopy with KTP laser assisted excision of lesion;  Surgeon: José Wiggins MD;  Location: [address];  Service: ENT;  Laterality: N/A;  Microscope, telescopes, tower, microinstruments, KTP laser; laser confirmed on 8/18 at 10am #[id number] (cL8/12)    LEFT HEART CATHETERIZATION Left 9/19/2019    Procedure: CATHETERIZATION, HEART, LEFT  (RIGHT RADIAL ACCESS);  Surgeon: Ronnie Bailey MD;  Location: [facility] CATH/EP LAB;  Service: Cardiology;  Laterality: Left;    LUMBAR DISC SURGERY Bilateral     L4- L5 , 2016 April, Dr Trans    REPAIR,TENDON,DISTAL PROXIMAL Left 3/23/2023    Procedure: REPAIR,TENDON,DISTAL PROXIMAL;  Surgeon: Cornell Chan MD;  Location: [facility];  Service: Orthopedics;  Laterality: Left;    VOCAL FOLD LESION EXCISION  2008     FAMILY HISTORY:   Family History   Problem Relation Name Age of Onset    Allergic rhinitis Mother      Hypertension Mother      Heart disease Mother      Stroke Mother       Heart disease Father  49    Cancer Maternal Aunt          breast    Heart disease Maternal Aunt      Angioedema Neg Hx      Asthma Neg Hx      Atopy Neg Hx      Eczema Neg Hx      Immunodeficiency Neg Hx      Urticaria Neg Hx       SOCIAL HISTORY:   Social History     Occupational History    Not on file   Tobacco Use    Smoking status: Never    Smokeless tobacco: Never   Substance and Sexual Activity    Alcohol use: No    Drug use: No    Sexual activity: Yes     Partners: Female        MEDICATIONS:   Current Outpatient Medications:     losartan (COZAAR) 25 MG tablet, Take 1 tablet (25 mg total) by mouth once daily., Disp: 90 tablet, Rfl: 3    meloxicam (MOBIC) 15 MG tablet, Take 1 tablet (15 mg total) by mouth once daily., Disp: 30 tablet, Rfl: 3    methylPREDNISolone (MEDROL DOSEPACK) 4 mg tablet, use as directed, Disp: 21 each, Rfl: 0    metoprolol succinate (TOPROL-XL) 25 MG 24 hr tablet, Take 1 tablet (25 mg total) by mouth once daily., Disp: 90 tablet, Rfl: 3    pantoprazole (PROTONIX) 40 MG tablet, Take 1 tablet (40 mg total) by mouth once daily., Disp: 30 tablet, Rfl: 11  No current facility-administered medications for this visit.    Facility-Administered Medications Ordered in Other Visits:     0.9%  NaCl infusion, , Intravenous, Continuous, Ronnie Bailey MD, Last Rate: 75 mL/hr at 09/19/19 0930, New Bag at 09/19/19 0930  ALLERGIES:   Review of patient's allergies indicates:   Allergen Reactions    Ace inhibitors Other (See Comments)     cough    Tramadol Itching    Tylenol [acetaminophen] Other (See Comments)     cough         Physical Exam     There were no vitals filed for this visit.  Alert and oriented to person, place and time. No acute distress. Well-groomed, not ill appearing. Pupils round and reactive, normal respiratory effort, no audible wheezing.     On exam he has no obvious rupture of the biceps.  Negative hook test.  No significant tenderness over the radial tuberosity and biceps  attachment.  No significant swelling ecchymosis or bruising.  Olecranon bursitis.  Full range of motion of the right elbow.  There is mild pain with resisted supination and positive Speed's and Yergason's test.        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  Dispense: 30 tablet; Refill: 3         Treatment options were discussed with him in detail.  I did discuss with him the does have arthritis of his right elbow on x-ray.  Likely secondary to overuse and his line of work in the past.  We did discuss limitation in heavy weights long term.  In regards to his right elbow I do think he has biceps tendinitis or possible partial rupture.  This seems to slowly be getting better.  We will try a short course of anti-inflammatories and home exercise program with stretching.  If he does not improve over the next 3-4 weeks certainly MRI would be indicated.

## 2024-05-08 LAB — HEMOCCULT STL QL IA: NEGATIVE

## 2024-05-20 ENCOUNTER — TELEPHONE (OUTPATIENT)
Dept: PRIMARY CARE CLINIC | Facility: CLINIC | Age: 49
End: 2024-05-20
Payer: MEDICAID

## 2024-05-20 NOTE — TELEPHONE ENCOUNTER
Appointment scheduled for the date of 5-21-24 with AMARIS England. Patient agreed to appointment date, time, and location.    
Jeanie Hanson, Patient Care Assistant  [facility] Staff     ----- Message from Jeanie Hanson Patient Care Assistant sent at 5/20/2024 10:12 AM CDT -----  Regarding: appointment  Contact: pt  Type:  Sooner Appointment Request    Caller is requesting a sooner appointment.  Caller declined first available appointment listed below.  Caller will not accept being placed on the waitlist and is requesting a message be sent to doctor.    Name of Caller:  pt     When is the first available appointment?  2024    Symptoms:  back pain     Would the patient rather a call back or a response via [facility]? Callback     Best Call Back Number:  [phone] (home)     Additional Information:  please call to advise. Thanks!  
none required

## 2024-05-21 ENCOUNTER — OFFICE VISIT (OUTPATIENT)
Dept: ORTHOPEDICS | Facility: CLINIC | Age: 49
End: 2024-05-21
Payer: MEDICAID

## 2024-05-21 ENCOUNTER — OFFICE VISIT (OUTPATIENT)
Dept: FAMILY MEDICINE | Facility: CLINIC | Age: 49
End: 2024-05-21
Payer: MEDICAID

## 2024-05-21 VITALS — BODY MASS INDEX: 32.28 KG/M2 | HEIGHT: 72 IN | WEIGHT: 238.31 LBS

## 2024-05-21 VITALS
HEART RATE: 69 BPM | HEIGHT: 72 IN | SYSTOLIC BLOOD PRESSURE: 122 MMHG | DIASTOLIC BLOOD PRESSURE: 80 MMHG | WEIGHT: 234.38 LBS | OXYGEN SATURATION: 95 % | TEMPERATURE: 98 F | BODY MASS INDEX: 31.74 KG/M2

## 2024-05-21 DIAGNOSIS — M54.41 CHRONIC MIDLINE LOW BACK PAIN WITH RIGHT-SIDED SCIATICA: Primary | ICD-10-CM

## 2024-05-21 DIAGNOSIS — M25.521 RIGHT ELBOW PAIN: Primary | ICD-10-CM

## 2024-05-21 DIAGNOSIS — G89.29 CHRONIC MIDLINE LOW BACK PAIN WITH RIGHT-SIDED SCIATICA: Primary | ICD-10-CM

## 2024-05-21 PROCEDURE — 1160F RVW MEDS BY RX/DR IN RCRD: CPT | Mod: CPTII,,, | Performed by: NURSE PRACTITIONER

## 2024-05-21 PROCEDURE — 99999 PR PBB SHADOW E&M-EST. PATIENT-LVL IV: CPT | Mod: PBBFAC,,, | Performed by: NURSE PRACTITIONER

## 2024-05-21 PROCEDURE — 1159F MED LIST DOCD IN RCRD: CPT | Mod: CPTII,,, | Performed by: NURSE PRACTITIONER

## 2024-05-21 PROCEDURE — 3079F DIAST BP 80-89 MM HG: CPT | Mod: CPTII,,, | Performed by: NURSE PRACTITIONER

## 2024-05-21 PROCEDURE — 99212 OFFICE O/P EST SF 10 MIN: CPT | Mod: PBBFAC,PO,25 | Performed by: ORTHOPAEDIC SURGERY

## 2024-05-21 PROCEDURE — 3008F BODY MASS INDEX DOCD: CPT | Mod: CPTII,,, | Performed by: ORTHOPAEDIC SURGERY

## 2024-05-21 PROCEDURE — 96372 THER/PROPH/DIAG INJ SC/IM: CPT | Mod: PBBFAC,PO

## 2024-05-21 PROCEDURE — 99999PBSHW PR PBB SHADOW TECHNICAL ONLY FILED TO HB: Mod: PBBFAC,,,

## 2024-05-21 PROCEDURE — 99213 OFFICE O/P EST LOW 20 MIN: CPT | Mod: S$PBB,,, | Performed by: NURSE PRACTITIONER

## 2024-05-21 PROCEDURE — 99214 OFFICE O/P EST MOD 30 MIN: CPT | Mod: PBBFAC,27,PO,25 | Performed by: NURSE PRACTITIONER

## 2024-05-21 PROCEDURE — 3074F SYST BP LT 130 MM HG: CPT | Mod: CPTII,,, | Performed by: NURSE PRACTITIONER

## 2024-05-21 PROCEDURE — 99999 PR PBB SHADOW E&M-EST. PATIENT-LVL II: CPT | Mod: PBBFAC,,, | Performed by: ORTHOPAEDIC SURGERY

## 2024-05-21 PROCEDURE — 99214 OFFICE O/P EST MOD 30 MIN: CPT | Mod: S$PBB,,, | Performed by: ORTHOPAEDIC SURGERY

## 2024-05-21 PROCEDURE — 3008F BODY MASS INDEX DOCD: CPT | Mod: CPTII,,, | Performed by: NURSE PRACTITIONER

## 2024-05-21 RX ORDER — KETOROLAC TROMETHAMINE 30 MG/ML
15 INJECTION, SOLUTION INTRAMUSCULAR; INTRAVENOUS
Status: COMPLETED | OUTPATIENT
Start: 2024-05-21 | End: 2024-05-21

## 2024-05-21 RX ORDER — CYCLOBENZAPRINE HCL 5 MG
5 TABLET ORAL 3 TIMES DAILY PRN
Status: CANCELLED | OUTPATIENT
Start: 2024-05-21 | End: 2024-05-31

## 2024-05-21 RX ORDER — METHOCARBAMOL 750 MG/1
750 TABLET, FILM COATED ORAL 3 TIMES DAILY
Qty: 30 TABLET | Refills: 0 | Status: CANCELLED | OUTPATIENT
Start: 2024-05-21 | End: 2024-05-31

## 2024-05-21 RX ORDER — MELOXICAM 7.5 MG/1
7.5 TABLET ORAL DAILY
Status: CANCELLED | OUTPATIENT
Start: 2024-05-21

## 2024-05-21 RX ADMIN — KETOROLAC TROMETHAMINE 15 MG: 30 INJECTION, SOLUTION INTRAMUSCULAR; INTRAVENOUS at 03:05

## 2024-05-21 NOTE — PROGRESS NOTES
Patient ID: Shelly Gannon Jr. is a 49 y.o. male    Chief Complaint:   Chief Complaint   Patient presents with    Follow-up     R elbow        History of Present Illness:    49-year-old male here for evaluation of right elbow pain.  Treated by me in the past for right triceps rupture.  Recovering well from this.  Over the past month or so he has had a few bouts of right elbow pain mostly in the proximal forearm.  This started after a fall and he went to grab a bar.  Has had a couple of episodes where he felt pain at night that woke him up out of a sleep.  No pain down in the fingertips or hand.  Most pain seems to be isolated to the distal biceps and elbow.    ____________________________________________________________________    Interval history 05/21/2024 : Patient returns today for  follow up of his right elbow.  At his last visit we did a trial of anti-inflammatories and home exercise program.  He reports no significant improvement.    PAST MEDICAL HISTORY:   Past Medical History:   Diagnosis Date    Allergy     Anxiety     Hypertension     Migraine headache     Migraine syndrome      PAST SURGICAL HISTORY:   Past Surgical History:   Procedure Laterality Date    BACK SURGERY      FRACTURE SURGERY      LARYNGOSCOPY N/A 8/18/2022    Procedure: Suspension microlaryngoscopy with KTP laser assisted excision of lesion;  Surgeon: José Wiggins MD;  Location: [facility] OR [address] [place] [address];  Service: ENT;  Laterality: N/A;  Microscope, telescopes, tower, microinstruments, KTP laser; laser confirmed on 8/18 at 10am #[id number] (cL8/12)    LEFT HEART CATHETERIZATION Left 9/19/2019    Procedure: CATHETERIZATION, HEART, LEFT  (RIGHT RADIAL ACCESS);  Surgeon: Ronnie Bailey MD;  Location: [facility] CATH/EP LAB;  Service: Cardiology;  Laterality: Left;    LUMBAR DISC SURGERY Bilateral     L4- L5 , 2016 AprilDr Barahona    REPAIR,TENDON,DISTAL PROXIMAL Left 3/23/2023    Procedure: REPAIR,TENDON,DISTAL PROXIMAL;  Surgeon: Cornell  MD Rocio;  Location: [facility];  Service: Orthopedics;  Laterality: Left;    VOCAL FOLD LESION EXCISION  2008     FAMILY HISTORY:   Family History   Problem Relation Name Age of Onset    Allergic rhinitis Mother      Hypertension Mother      Heart disease Mother      Stroke Mother      Heart disease Father  49    Cancer Maternal Aunt          breast    Heart disease Maternal Aunt      Angioedema Neg Hx      Asthma Neg Hx      Atopy Neg Hx      Eczema Neg Hx      Immunodeficiency Neg Hx      Urticaria Neg Hx       SOCIAL HISTORY:   Social History     Occupational History    Not on file   Tobacco Use    Smoking status: Never    Smokeless tobacco: Never   Substance and Sexual Activity    Alcohol use: No    Drug use: No    Sexual activity: Yes     Partners: Female        MEDICATIONS:   Current Outpatient Medications:     losartan (COZAAR) 25 MG tablet, Take 1 tablet (25 mg total) by mouth once daily., Disp: 90 tablet, Rfl: 3    meloxicam (MOBIC) 15 MG tablet, Take 1 tablet (15 mg total) by mouth once daily., Disp: 30 tablet, Rfl: 3    metoprolol succinate (TOPROL-XL) 25 MG 24 hr tablet, Take 1 tablet (25 mg total) by mouth once daily., Disp: 90 tablet, Rfl: 3    pantoprazole (PROTONIX) 40 MG tablet, Take 1 tablet (40 mg total) by mouth once daily., Disp: 30 tablet, Rfl: 11  No current facility-administered medications for this visit.    Facility-Administered Medications Ordered in Other Visits:     0.9%  NaCl infusion, , Intravenous, Continuous, Ronnie Bailey MD, Last Rate: 75 mL/hr at 09/19/19 0930, New Bag at 09/19/19 0930  ALLERGIES:   Review of patient's allergies indicates:   Allergen Reactions    Ace inhibitors Other (See Comments)     cough    Tramadol Itching    Tylenol [acetaminophen] Other (See Comments)     cough         Physical Exam     There were no vitals filed for this visit.  Alert and oriented to person, place and time. No acute distress. Well-groomed, not ill appearing. Pupils round and reactive,  normal respiratory effort, no audible wheezing.     On exam he has no obvious rupture of the biceps.  Negative hook test.  No significant tenderness over the radial tuberosity and biceps attachment.  No significant swelling ecchymosis or bruising.  Olecranon bursitis.  Full range of motion of the right elbow.  There is mild pain with resisted supination and positive Speed's and Yergason's test.        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  I did discuss with him the does have arthritis of his right elbow on x-ray.  Likely secondary to overuse and his line of work in the past.  We did discuss limitation in heavy weights long term.  In regards to his right elbow I do think he has biceps tendinitis or possible partial rupture.    This has not seemed to improve with conservative measures including anti-inflammatories and physician guided home exercise program.  My recommendation at this point his MRI of the right elbow to evaluate the biceps tendon and cartilage status post trauma

## 2024-05-21 NOTE — PROGRESS NOTES
Subjective:       Patient ID: Shelly Gannon Jr. is a 49 y.o. male.    Chief Complaint: Back Pain     HPI   48 y/o male patient with medical problems listed below presents for right lower back pain. This is chronic problem for more than 2 years. He has hx of lumbar disc surgery done in 2017. Describes pain as aching, pain 6/10 in intensity, intermittent, radiating down to the affected leg, aggravated by walking and prolonged sitting but denies nausea, vomiting, abdominal pain, urinary or bowel symptoms, generalized body ache or weakness. He states has tried ibuprofen , mobic, oral steroid but no relief. States muscle relaxer helped some but made him drowsy. States could not take tylenol since he is allergic to. Patient was seen in the clinic a month ago on 4/19 for the problem and had back spine x-ray done.     Lumbar spine x-ray 4/19/2024  FINDINGS:  Mildly exaggerated lumbar lordosis.  Mild grade 1 retrolisthesis L4 on L5.  Moderate disc space height loss L4-5 with mild osteophyte production.  Mild disc space height loss L5-S1.  Normal vertebral body heights.  Transitional lumbosacral anatomy on the left with articulation of the left L5 transverse process with the sacrum.  No fracture or osseous destruction.     Impression:   As above.    Labs reviewed from 9/2023    Patient Active Problem List   Diagnosis    Knee bursitis    Palpitations    Obesity (BMI 30-39.9)    Essential hypertension    Gastroesophageal reflux disease with esophagitis    Abnormal stress test    Chest pain      Review of patient's allergies indicates:   Allergen Reactions    Ace inhibitors Other (See Comments)     cough    Tramadol Itching    Tylenol [acetaminophen] Other (See Comments)     cough     Past Surgical History:   Procedure Laterality Date    BACK SURGERY      FRACTURE SURGERY      LARYNGOSCOPY N/A 8/18/2022    Procedure: Suspension microlaryngoscopy with KTP laser assisted excision of lesion;  Surgeon: José Wiggins MD;   Location: [facility] OR [facility];  Service: ENT;  Laterality: N/A;  Microscope, telescopes, tower, microinstruments, KTP laser; laser confirmed on 8/18 at 10am #[id number] (cL8/12)    LEFT HEART CATHETERIZATION Left 9/19/2019    Procedure: CATHETERIZATION, HEART, LEFT  (RIGHT RADIAL ACCESS);  Surgeon: Ronnie Bailey MD;  Location: [facility] CATH/EP LAB;  Service: Cardiology;  Laterality: Left;    LUMBAR DISC SURGERY Bilateral     L4- L5 , 2016 April,  Trans    REPAIR,TENDON,DISTAL PROXIMAL Left 3/23/2023    Procedure: REPAIR,TENDON,DISTAL PROXIMAL;  Surgeon: Cornell Chan MD;  Location: [facility] OR;  Service: Orthopedics;  Laterality: Left;    VOCAL FOLD LESION EXCISION  2008        Current Outpatient Medications:     losartan (COZAAR) 25 MG tablet, Take 1 tablet (25 mg total) by mouth once daily. (Patient not taking: Reported on 5/21/2024), Disp: 90 tablet, Rfl: 3    metoprolol succinate (TOPROL-XL) 25 MG 24 hr tablet, Take 1 tablet (25 mg total) by mouth once daily. (Patient not taking: Reported on 5/21/2024), Disp: 90 tablet, Rfl: 3    pantoprazole (PROTONIX) 40 MG tablet, Take 1 tablet (40 mg total) by mouth once daily. (Patient not taking: Reported on 5/21/2024), Disp: 30 tablet, Rfl: 11  No current facility-administered medications for this visit.    Facility-Administered Medications Ordered in Other Visits:     0.9%  NaCl infusion, , Intravenous, Continuous, Ronnie Bailey MD, Last Rate: 75 mL/hr at 09/19/19 0930, New Bag at 09/19/19 0930    Review of Systems   Constitutional:  Negative for chills and fever.   Respiratory:  Negative for cough and shortness of breath.    Cardiovascular:  Negative for chest pain and palpitations.   Gastrointestinal:  Negative for abdominal pain, nausea and vomiting.   Musculoskeletal:  Positive for back pain.   Neurological:  Negative for dizziness and headaches.       Objective:   /80 (BP Location: Left arm, Patient Position: Sitting, BP Method: Large (Manual))   Pulse 69    Temp 98.2 °F (36.8 °C) (Oral)   Ht 6' (1.829 m)   Wt 106.3 kg (234 lb 5.6 oz)   SpO2 95%   BMI 31.78 kg/m²         Physical Exam  Vitals reviewed.   Constitutional:       General: He is not in acute distress.     Appearance: Normal appearance.   Cardiovascular:      Rate and Rhythm: Normal rate and regular rhythm.      Pulses: Normal pulses.      Heart sounds: Normal heart sounds.   Pulmonary:      Effort: Pulmonary effort is normal.      Breath sounds: Normal breath sounds.   Chest:      Chest wall: No deformity, swelling or edema.   Abdominal:      General: Abdomen is flat. Bowel sounds are normal.      Palpations: Abdomen is soft.      Tenderness: There is no abdominal tenderness.   Musculoskeletal:      Cervical back: Normal range of motion.      Comments: +tenderness in right lower back    Neurological:      Mental Status: He is oriented to person, place, and time.         Assessment:       1. Chronic midline low back pain with right-sided sciatica        Plan:       1. Chronic midline low back pain with right-sided sciatica  - Ambulatory referral[facility]consult [facility]; Future  - ketorolac injection 15 mg     Patient with be reevaluated in  follow up with pcp  or sooner miles Ibarra NP

## 2024-05-28 ENCOUNTER — HOSPITAL ENCOUNTER (OUTPATIENT)
Dept: RADIOLOGY | Facility: HOSPITAL | Age: 49
Discharge: HOME | End: 2024-05-28
Attending: ORTHOPAEDIC SURGERY
Payer: MEDICAID

## 2024-05-28 DIAGNOSIS — M25.521 RIGHT ELBOW PAIN: ICD-10-CM

## 2024-05-31 DIAGNOSIS — M25.521 RIGHT ELBOW PAIN: Primary | ICD-10-CM

## 2024-06-02 ENCOUNTER — PATIENT MESSAGE (OUTPATIENT)
Dept: ADMINISTRATIVE | Facility: HOSPITAL | Age: 49
End: 2024-06-02
Payer: MEDICAID

## 2024-06-10 ENCOUNTER — TELEPHONE (OUTPATIENT)
Dept: ORTHOPEDICS | Facility: CLINIC | Age: 49
End: 2024-06-10
Payer: MEDICAID

## 2024-06-10 NOTE — TELEPHONE ENCOUNTER
----- Message from Caitlyn Thomas sent at 6/10/2024 10:11 AM CDT -----  Contact: self  Type:  Needs Medical Advice    Who Called: self  Symptoms (please be specific): pt wanted to give nurse the address to where he is going get his MRI, it is [facility] ,[address] in [facility]   Would the patient rather a call back or a response via [facility]? call  Best Call Back Number: [phone] (home)     Additional Information: please advise and thank you.

## 2024-06-20 ENCOUNTER — TELEPHONE (OUTPATIENT)
Dept: ORTHOPEDICS | Facility: CLINIC | Age: 49
End: 2024-06-20
Payer: MEDICAID

## 2024-06-20 NOTE — TELEPHONE ENCOUNTER
----- Message from Aria Toussaint sent at 6/20/2024  1:33 PM CDT -----  Regarding: MRI CD  Pt came in and dropped off [facility] by Rocio. The disk was left up front.

## 2024-06-21 ENCOUNTER — OFFICE VISIT (OUTPATIENT)
Dept: ORTHOPEDICS | Facility: CLINIC | Age: 49
End: 2024-06-21
Payer: MEDICAID

## 2024-06-21 ENCOUNTER — TELEPHONE (OUTPATIENT)
Dept: ORTHOPEDICS | Facility: CLINIC | Age: 49
End: 2024-06-21

## 2024-06-21 VITALS — WEIGHT: 234.38 LBS | BODY MASS INDEX: 31.74 KG/M2 | HEIGHT: 72 IN

## 2024-06-21 DIAGNOSIS — M24.021 LOOSE BODY OF ELBOW, RIGHT: ICD-10-CM

## 2024-06-21 DIAGNOSIS — M25.521 RIGHT ELBOW PAIN: Primary | ICD-10-CM

## 2024-06-21 PROCEDURE — 99213 OFFICE O/P EST LOW 20 MIN: CPT | Mod: PBBFAC,PO | Performed by: ORTHOPAEDIC SURGERY

## 2024-06-21 PROCEDURE — 99999 PR PBB SHADOW E&M-EST. PATIENT-LVL III: CPT | Mod: PBBFAC,,, | Performed by: ORTHOPAEDIC SURGERY

## 2024-06-21 NOTE — PROGRESS NOTES
Patient ID: Shelly Gannon Jr. is a 49 y.o. male    Chief Complaint:   Chief Complaint   Patient presents with    Follow-up     MRI F/U Rt elbow       History of Present Illness:    49-year-old male here for evaluation of right elbow pain.  Treated by me in the past for left triceps rupture.  Recovering well from this.  Over the past month or so he has had a few bouts of right elbow pain mostly in the her elbow.  We tried anti-inflammatories as well as a home exercise program.  He continues to have fairly persistent mechanical symptoms and popping and catching sensation in the front anterior aspect of his elbow with certain activities.  He has known DJD of the right elbow likely from weightlifting.  Denies any pain in the posterior elbow along the triceps or lateral epicondyle.    PAST MEDICAL HISTORY:   Past Medical History:   Diagnosis Date    Allergy     Anxiety     Hypertension     Migraine headache     Migraine syndrome      PAST SURGICAL HISTORY:   Past Surgical History:   Procedure Laterality Date    BACK SURGERY      FRACTURE SURGERY      LARYNGOSCOPY N/A 8/18/2022    Procedure: Suspension microlaryngoscopy with KTP laser assisted excision of lesion;  Surgeon: José Wiggins MD;  Location: [address];  Service: ENT;  Laterality: N/A;  Microscope, telescopes, tower, microinstruments, KTP laser; laser confirmed on 8/18 at 10am #[id number] (cL8/12)    LEFT HEART CATHETERIZATION Left 9/19/2019    Procedure: CATHETERIZATION, HEART, LEFT  (RIGHT RADIAL ACCESS);  Surgeon: Ronnie Bailey MD;  Location: [facility] CATH/EP LAB;  Service: Cardiology;  Laterality: Left;    LUMBAR DISC SURGERY Bilateral     L4- L5 , 2016 April,  Trans    REPAIR,TENDON,DISTAL PROXIMAL Left 3/23/2023    Procedure: REPAIR,TENDON,DISTAL PROXIMAL;  Surgeon: Cornell Chan MD;  [facility];  Service: Orthopedics;  Laterality: Left;    VOCAL FOLD LESION EXCISION  2008     FAMILY HISTORY:   Family History   Problem  Relation Name Age of Onset    Allergic rhinitis Mother      Hypertension Mother      Heart disease Mother      Stroke Mother      Heart disease Father  49    Cancer Maternal Aunt          breast    Heart disease Maternal Aunt      Angioedema Neg Hx      Asthma Neg Hx      Atopy Neg Hx      Eczema Neg Hx      Immunodeficiency Neg Hx      Urticaria Neg Hx       SOCIAL HISTORY:   Social History     Occupational History    Not on file   Tobacco Use    Smoking status: Never    Smokeless tobacco: Never   Substance and Sexual Activity    Alcohol use: No    Drug use: No    Sexual activity: Yes     Partners: Female        MEDICATIONS:   Current Outpatient Medications:     losartan (COZAAR) 25 MG tablet, Take 1 tablet (25 mg total) by mouth once daily., Disp: 90 tablet, Rfl: 3    metoprolol succinate (TOPROL-XL) 25 MG 24 hr tablet, Take 1 tablet (25 mg total) by mouth once daily., Disp: 90 tablet, Rfl: 3    pantoprazole (PROTONIX) 40 MG tablet, Take 1 tablet (40 mg total) by mouth once daily., Disp: 30 tablet, Rfl: 11  No current facility-administered medications for this visit.    Facility-Administered Medications Ordered in Other Visits:     0.9%  NaCl infusion, , Intravenous, Continuous, Ronnie Bailey MD, Last Rate: 75 mL/hr at 09/19/19 0930, New Bag at 09/19/19 0930  ALLERGIES:   Review of patient's allergies indicates:   Allergen Reactions    Ace inhibitors Other (See Comments)     cough    Tramadol Itching    Tylenol [acetaminophen] Other (See Comments)     cough         Physical Exam     There were no vitals filed for this visit.  Alert and oriented to person, place and time. No acute distress. Well-groomed, not ill appearing. Pupils round and reactive, normal respiratory effort, no audible wheezing.     On exam he has no obvious rupture of the biceps.  Negative hook test.  No significant tenderness over the radial tuberosity and biceps attachment.  No significant swelling ecchymosis or bruising.  No tenderness  over the olecranon or lateral epicondyle.  No significant pain with wrist extension resisted.  He is limited range of motion and extension likely secondary to arthritis at 10°.  Has near full flexion.  He has no pain with passive pronation and supination.    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  There is interstitial tearing of the central triceps as well as tendinosis of the lateral epicondyle.      Assessment & Plan    Right elbow pain  -     Ambulatory referral/consult to Orthopedics; Future; Expected date: 06/28/2024    Loose body of elbow, right         Treatment options were discussed with him in detail.  I did discuss with him the does have arthritis of his right elbow on x-ray.  This was confirmed on MRI.  In addition he likely has loose body of the elbow which is in the location of his pain.  He reports sharp locking type pain where his elbow gets stuck.  He denies any pain over the olecranon or lateral epicondyle although his MRI does show chronic findings of tendinopathy there.  He is interested in having this removed due to persistent mechanical symptoms.  I discussed that I can do this as an open procedure although he may be better served if this can be done arthroscopically.  We will send him to a hand and upper extremity specialist to see if he had be a candidate for arthroscopy.

## 2024-06-21 NOTE — TELEPHONE ENCOUNTER
Please contact pt to scheduled with Dr. Duarte. Referral placed in system. MRI completed and in EPIC, report in media

## 2024-07-01 ENCOUNTER — TELEPHONE (OUTPATIENT)
Dept: ORTHOPEDICS | Facility: CLINIC | Age: 49
End: 2024-07-01
Payer: MEDICAID

## 2024-07-01 DIAGNOSIS — S46.312A TRICEPS TENDON RUPTURE, LEFT, INITIAL ENCOUNTER: Primary | ICD-10-CM

## 2024-07-01 NOTE — TELEPHONE ENCOUNTER
----- Message from Chuck Gaona sent at 7/1/2024 10:48 AM CDT -----  Regarding: Appt  Contact: pt [phone]  Pt was seen by provider 6/21, pt is checking the status of referral to elbow specialist, please call pt @[phone]

## 2024-07-02 ENCOUNTER — TELEPHONE (OUTPATIENT)
Dept: ORTHOPEDICS | Facility: CLINIC | Age: 49
End: 2024-07-02
Payer: MEDICAID

## 2024-07-02 NOTE — TELEPHONE ENCOUNTER
calling to schedule Ortho referral, left message to call back    Ok per Dr Barron to schedule next available

## 2024-07-29 ENCOUNTER — OFFICE VISIT (OUTPATIENT)
Dept: ORTHOPEDICS | Facility: CLINIC | Age: 49
End: 2024-07-29
Payer: MEDICAID

## 2024-07-29 DIAGNOSIS — G56.21 CUBITAL TUNNEL SYNDROME ON RIGHT: ICD-10-CM

## 2024-07-29 DIAGNOSIS — M24.021 LOOSE BODY OF ELBOW, RIGHT: Primary | ICD-10-CM

## 2024-07-29 DIAGNOSIS — M77.8 TENDINITIS OF RIGHT TRICEPS: ICD-10-CM

## 2024-07-29 PROCEDURE — 1159F MED LIST DOCD IN RCRD: CPT | Mod: CPTII,,, | Performed by: ORTHOPAEDIC SURGERY

## 2024-07-29 PROCEDURE — 99212 OFFICE O/P EST SF 10 MIN: CPT | Mod: PBBFAC,PO | Performed by: ORTHOPAEDIC SURGERY

## 2024-07-29 PROCEDURE — 99204 OFFICE O/P NEW MOD 45 MIN: CPT | Mod: S$PBB,,, | Performed by: ORTHOPAEDIC SURGERY

## 2024-07-29 PROCEDURE — 99999 PR PBB SHADOW E&M-EST. PATIENT-LVL II: CPT | Mod: PBBFAC,,, | Performed by: ORTHOPAEDIC SURGERY

## 2024-07-29 NOTE — PATIENT INSTRUCTIONS
Surgery Instructions:     Your surgery is scheduled on 8/13/224 at the surgery center: [address], 1st floor, second entrance.    The pre-op department will be in contact with you prior to your procedure to review medications and instructions.       Nothing to eat or drink after midnight prior to day of surgery.    You should STOP taking any blood thinners 5 days prior to surgery.     The surgery center will contact you the day prior to surgery to advise you of your arrival time for surgery.     Your post op appointment is scheduled on 8/28/2024 at 10:40.

## 2024-07-29 NOTE — H&P (VIEW-ONLY)
7/29/2024    Chief Complaint:  Chief Complaint   Patient presents with    Right Elbow - Pain, Injury     Injury-3 months ago       HPI:  Shelly Gannon Jr. is a 49 y.o. male, who presents to clinic today has a history of right elbow pain and catching.  States that this has been going on for months.  He has had x-rays and an MRI.  He was here today to discuss further treatments.    PMHX:  Past Medical History:   Diagnosis Date    Allergy     Anxiety     Hypertension     Migraine headache     Migraine syndrome        PSHX:  Past Surgical History:   Procedure Laterality Date    BACK SURGERY      FRACTURE SURGERY      LARYNGOSCOPY N/A 8/18/2022    Procedure: Suspension microlaryngoscopy with KTP laser assisted excision of lesion;  Surgeon: José Wiggins MD;  Location: [facility] OR [address] [place] [address];  Service: ENT;  Laterality: N/A;  Microscope, telescopes, tower, microinstruments, KTP laser; laser confirmed on 8/18 at 10am #[id number] (cL8/12)    LEFT HEART CATHETERIZATION Left 9/19/2019    Procedure: CATHETERIZATION, HEART, LEFT  (RIGHT RADIAL ACCESS);  Surgeon: Ronnie Bailey MD;  Location: [facility] CATH/EP LAB;  Service: Cardiology;  Laterality: Left;    LUMBAR DISC SURGERY Bilateral     L4- L5 , 2016 April,  Trans    REPAIR,TENDON,DISTAL PROXIMAL Left 3/23/2023    Procedure: REPAIR,TENDON,DISTAL PROXIMAL;  Surgeon: Cornell Chan MD;  Location: [facility];  Service: Orthopedics;  Laterality: Left;    VOCAL FOLD LESION EXCISION  2008       FMHX:  Family History   Problem Relation Name Age of Onset    Allergic rhinitis Mother      Hypertension Mother      Heart disease Mother      Stroke Mother      Heart disease Father  49    Cancer Maternal Aunt          breast    Heart disease Maternal Aunt      Angioedema Neg Hx      Asthma Neg Hx      Atopy Neg Hx      Eczema Neg Hx      Immunodeficiency Neg Hx      Urticaria Neg Hx         SOCHX:  Social History     Tobacco Use    Smoking status: Never    Smokeless tobacco: Never    Substance Use Topics    Alcohol use: No       ALLERGIES:  Ace inhibitors, Tramadol, and Tylenol [acetaminophen]    CURRENT MEDICATIONS:  Current Outpatient Medications on File Prior to Visit   Medication Sig Dispense Refill    losartan (COZAAR) 25 MG tablet Take 1 tablet (25 mg total) by mouth once daily. 90 tablet 3    metoprolol succinate (TOPROL-XL) 25 MG 24 hr tablet Take 1 tablet (25 mg total) by mouth once daily. 90 tablet 3    pantoprazole (PROTONIX) 40 MG tablet Take 1 tablet (40 mg total) by mouth once daily. 30 tablet 11     Current Facility-Administered Medications on File Prior to Visit   Medication Dose Route Frequency Provider Last Rate Last Admin    0.9%  NaCl infusion   Intravenous Continuous Ronnie Bailey MD 75 mL/hr at 09/19/19 0930 New Bag at 09/19/19 0930       REVIEW OF SYSTEMS:  Review of Systems   Constitutional: Negative.    HENT: Negative.     Eyes: Negative.    Respiratory: Negative.     Cardiovascular: Negative.    Gastrointestinal: Negative.    Genitourinary: Negative.    Musculoskeletal:  Positive for falls and joint pain.   Skin: Negative.    Neurological:  Positive for tingling and weakness.   Endo/Heme/Allergies: Negative.    Psychiatric/Behavioral: Negative.       GENERAL PHYSICAL EXAM:   There were no vitals taken for this visit.   GEN: well developed, well nourished, no acute distress   HENT: Normocephalic, atraumatic   EYES: No discharge, conjunctiva normal   NECK: Supple, non-tender   PULM: No wheezing, no respiratory distress   CV: RRR   ABD: Soft, non-tender    ORTHO EXAM:   Examination of the right elbow reveals that there is no edema.  There are no major skin changes.  Palpation produces no specific area of tenderness.  There is no tenderness over the triceps insertion.  Range of motion is 140° of flexion to 10° from full extension.  There was no varus or valgus instability.  Has 5/5 triceps strength.  Has a 2+ radial pulse distally.  He does have decreased sensation in  the ulnar distribution with positive Tinel's over the cubital tunnel.    RADIOLOGY:   MRI of the right elbow has been reviewed.  There is noted to be loose body within the coronoid fossa.  There is also moderate size joint effusion.  There is triceps enthesopathy with some tearing of the insertion site of the triceps.  There is enthesopathy over the common extensor origin as well.    ASSESSMENT:   Right elbow loose body, right cubital tunnel syndrome, right triceps tendinopathy    PLAN:  1. I have discussed treatment with the patient.  I have discussed the possibility of elbow arthroscopy with removal of the loose body.  I have also discussed the possibility of cubital tunnel release.  I did discuss triceps debridement with repair.  After discussion of the risks and benefits of all those procedures we have elected to undergo elbow arthroscopy with removal of the loose body and cubital tunnel release.  We will hold off on the triceps debridement as he was not having a significant amount of pain over the triceps at this time.    2.  Will proceed with right elbow arthroscopy for loose body removal and cubital tunnel release.    3.  Will follow up with me 2 weeks postoperatively

## 2024-07-29 NOTE — PROGRESS NOTES
7/29/2024    Chief Complaint:  Chief Complaint   Patient presents with    Right Elbow - Pain, Injury     Injury-3 months ago       HPI:  Shelly Gannon Jr. is a 49 y.o. male, who presents to clinic today has a history of right elbow pain and catching.  States that this has been going on for months.  He has had x-rays and an MRI.  He was here today to discuss further treatments.    PMHX:  Past Medical History:   Diagnosis Date    Allergy     Anxiety     Hypertension     Migraine headache     Migraine syndrome        PSHX:  Past Surgical History:   Procedure Laterality Date    BACK SURGERY      FRACTURE SURGERY      LARYNGOSCOPY N/A 8/18/2022    Procedure: Suspension microlaryngoscopy with KTP laser assisted excision of lesion;  Surgeon: José Wiggins MD;  Location: [facility] OR [address] [place] [address];  Service: ENT;  Laterality: N/A;  Microscope, telescopes, tower, microinstruments, KTP laser; laser confirmed on 8/18 at 10am #[id number] (cL8/12)    LEFT HEART CATHETERIZATION Left 9/19/2019    Procedure: CATHETERIZATION, HEART, LEFT  (RIGHT RADIAL ACCESS);  Surgeon: Ronnie Bailey MD;  Location: [facility] CATH/EP LAB;  Service: Cardiology;  Laterality: Left;    LUMBAR DISC SURGERY Bilateral     L4- L5 , 2016 April,  Trans    REPAIR,TENDON,DISTAL PROXIMAL Left 3/23/2023    Procedure: REPAIR,TENDON,DISTAL PROXIMAL;  Surgeon: Cornell Chan MD;  Location: [facility];  Service: Orthopedics;  Laterality: Left;    VOCAL FOLD LESION EXCISION  2008       FMHX:  Family History   Problem Relation Name Age of Onset    Allergic rhinitis Mother      Hypertension Mother      Heart disease Mother      Stroke Mother      Heart disease Father  49    Cancer Maternal Aunt          breast    Heart disease Maternal Aunt      Angioedema Neg Hx      Asthma Neg Hx      Atopy Neg Hx      Eczema Neg Hx      Immunodeficiency Neg Hx      Urticaria Neg Hx         SOCHX:  Social History     Tobacco Use    Smoking status: Never    Smokeless tobacco: Never    Substance Use Topics    Alcohol use: No       ALLERGIES:  Ace inhibitors, Tramadol, and Tylenol [acetaminophen]    CURRENT MEDICATIONS:  Current Outpatient Medications on File Prior to Visit   Medication Sig Dispense Refill    losartan (COZAAR) 25 MG tablet Take 1 tablet (25 mg total) by mouth once daily. 90 tablet 3    metoprolol succinate (TOPROL-XL) 25 MG 24 hr tablet Take 1 tablet (25 mg total) by mouth once daily. 90 tablet 3    pantoprazole (PROTONIX) 40 MG tablet Take 1 tablet (40 mg total) by mouth once daily. 30 tablet 11     Current Facility-Administered Medications on File Prior to Visit   Medication Dose Route Frequency Provider Last Rate Last Admin    0.9%  NaCl infusion   Intravenous Continuous Ronnie Bailey MD 75 mL/hr at 09/19/19 0930 New Bag at 09/19/19 0930       REVIEW OF SYSTEMS:  Review of Systems   Constitutional: Negative.    HENT: Negative.     Eyes: Negative.    Respiratory: Negative.     Cardiovascular: Negative.    Gastrointestinal: Negative.    Genitourinary: Negative.    Musculoskeletal:  Positive for falls and joint pain.   Skin: Negative.    Neurological:  Positive for tingling and weakness.   Endo/Heme/Allergies: Negative.    Psychiatric/Behavioral: Negative.       GENERAL PHYSICAL EXAM:   There were no vitals taken for this visit.   GEN: well developed, well nourished, no acute distress   HENT: Normocephalic, atraumatic   EYES: No discharge, conjunctiva normal   NECK: Supple, non-tender   PULM: No wheezing, no respiratory distress   CV: RRR   ABD: Soft, non-tender    ORTHO EXAM:   Examination of the right elbow reveals that there is no edema.  There are no major skin changes.  Palpation produces no specific area of tenderness.  There is no tenderness over the triceps insertion.  Range of motion is 140° of flexion to 10° from full extension.  There was no varus or valgus instability.  Has 5/5 triceps strength.  Has a 2+ radial pulse distally.  He does have decreased sensation in  the ulnar distribution with positive Tinel's over the cubital tunnel.    RADIOLOGY:   MRI of the right elbow has been reviewed.  There is noted to be loose body within the coronoid fossa.  There is also moderate size joint effusion.  There is triceps enthesopathy with some tearing of the insertion site of the triceps.  There is enthesopathy over the common extensor origin as well.    ASSESSMENT:   Right elbow loose body, right cubital tunnel syndrome, right triceps tendinopathy    PLAN:  1. I have discussed treatment with the patient.  I have discussed the possibility of elbow arthroscopy with removal of the loose body.  I have also discussed the possibility of cubital tunnel release.  I did discuss triceps debridement with repair.  After discussion of the risks and benefits of all those procedures we have elected to undergo elbow arthroscopy with removal of the loose body and cubital tunnel release.  We will hold off on the triceps debridement as he was not having a significant amount of pain over the triceps at this time.    2.  Will proceed with right elbow arthroscopy for loose body removal and cubital tunnel release.    3.  Will follow up with me 2 weeks postoperatively

## 2024-08-05 DIAGNOSIS — M24.021 LOOSE BODY OF ELBOW, RIGHT: Primary | ICD-10-CM

## 2024-08-05 DIAGNOSIS — G56.21 CUBITAL TUNNEL SYNDROME ON RIGHT: ICD-10-CM

## 2024-08-05 RX ORDER — CEFAZOLIN SODIUM 2 G/50ML
2 SOLUTION INTRAVENOUS
OUTPATIENT
Start: 2024-08-05

## 2024-08-05 RX ORDER — MUPIROCIN 20 MG/G
OINTMENT TOPICAL
OUTPATIENT
Start: 2024-08-05

## 2024-08-08 ENCOUNTER — TELEPHONE (OUTPATIENT)
Dept: ORTHOPEDICS | Facility: CLINIC | Age: 49
End: 2024-08-08
Payer: MEDICAID

## 2024-08-12 ENCOUNTER — ANESTHESIA EVENT (OUTPATIENT)
Dept: SURGERY | Facility: HOSPITAL | Age: 49
End: 2024-08-12
Payer: MEDICAID

## 2024-08-13 ENCOUNTER — ANESTHESIA (OUTPATIENT)
Dept: SURGERY | Facility: HOSPITAL | Age: 49
End: 2024-08-13
Payer: MEDICAID

## 2024-08-13 ENCOUNTER — HOSPITAL ENCOUNTER (OUTPATIENT)
Facility: HOSPITAL | Age: 49
Discharge: HOME | End: 2024-08-13
Attending: ORTHOPAEDIC SURGERY | Admitting: ORTHOPAEDIC SURGERY
Payer: MEDICAID

## 2024-08-13 VITALS
SYSTOLIC BLOOD PRESSURE: 153 MMHG | HEART RATE: 77 BPM | DIASTOLIC BLOOD PRESSURE: 80 MMHG | BODY MASS INDEX: 31.56 KG/M2 | TEMPERATURE: 98 F | HEIGHT: 72 IN | RESPIRATION RATE: 28 BRPM | WEIGHT: 233 LBS | OXYGEN SATURATION: 95 %

## 2024-08-13 DIAGNOSIS — M24.021 LOOSE BODY OF ELBOW, RIGHT: ICD-10-CM

## 2024-08-13 DIAGNOSIS — G56.21 CUBITAL TUNNEL SYNDROME ON RIGHT: ICD-10-CM

## 2024-08-13 PROCEDURE — 25000003 PHARM REV CODE 250: Mod: PO | Performed by: NURSE ANESTHETIST, CERTIFIED REGISTERED

## 2024-08-13 PROCEDURE — 27201423 OPTIME MED/SURG SUP & DEVICES STERILE SUPPLY: Mod: PO | Performed by: ORTHOPAEDIC SURGERY

## 2024-08-13 PROCEDURE — 37000009 HC ANESTHESIA EA ADD 15 MINS: Mod: PO | Performed by: ORTHOPAEDIC SURGERY

## 2024-08-13 PROCEDURE — 64718 REVISE ULNAR NERVE AT ELBOW: CPT | Mod: 51,RT,, | Performed by: ORTHOPAEDIC SURGERY

## 2024-08-13 PROCEDURE — 63600175 PHARM REV CODE 636 W HCPCS: Mod: PO | Performed by: NURSE ANESTHETIST, CERTIFIED REGISTERED

## 2024-08-13 PROCEDURE — 37000008 HC ANESTHESIA 1ST 15 MINUTES: Mod: PO | Performed by: ORTHOPAEDIC SURGERY

## 2024-08-13 PROCEDURE — 29836 ELBOW ARTHROSCOPY/SURGERY: CPT | Mod: RT,,, | Performed by: ORTHOPAEDIC SURGERY

## 2024-08-13 PROCEDURE — 71000033 HC RECOVERY, INTIAL HOUR: Mod: PO | Performed by: ORTHOPAEDIC SURGERY

## 2024-08-13 PROCEDURE — 36000711: Mod: PO | Performed by: ORTHOPAEDIC SURGERY

## 2024-08-13 PROCEDURE — 63600175 PHARM REV CODE 636 W HCPCS: Mod: JZ,JG,PO | Performed by: ORTHOPAEDIC SURGERY

## 2024-08-13 PROCEDURE — 25000003 PHARM REV CODE 250: Mod: PO | Performed by: PHYSICIAN ASSISTANT

## 2024-08-13 PROCEDURE — 29834 ELBOW ARTHROSCOPY/SURGERY: CPT | Mod: 51,RT,, | Performed by: ORTHOPAEDIC SURGERY

## 2024-08-13 PROCEDURE — 25000003 PHARM REV CODE 250: Mod: PO | Performed by: ORTHOPAEDIC SURGERY

## 2024-08-13 PROCEDURE — 71000015 HC POSTOP RECOV 1ST HR: Mod: PO | Performed by: ORTHOPAEDIC SURGERY

## 2024-08-13 PROCEDURE — 36000710: Mod: PO | Performed by: ORTHOPAEDIC SURGERY

## 2024-08-13 RX ORDER — EPHEDRINE SULFATE 50 MG/ML
INJECTION, SOLUTION INTRAVENOUS
Status: DISCONTINUED | OUTPATIENT
Start: 2024-08-13 | End: 2024-08-13

## 2024-08-13 RX ORDER — LIDOCAINE HYDROCHLORIDE 10 MG/ML
INJECTION, SOLUTION EPIDURAL; INFILTRATION; INTRACAUDAL; PERINEURAL
Status: DISCONTINUED | OUTPATIENT
Start: 2024-08-13 | End: 2024-08-13 | Stop reason: HOSPADM

## 2024-08-13 RX ORDER — FENTANYL CITRATE 50 UG/ML
INJECTION, SOLUTION INTRAMUSCULAR; INTRAVENOUS
Status: DISCONTINUED | OUTPATIENT
Start: 2024-08-13 | End: 2024-08-13

## 2024-08-13 RX ORDER — ONDANSETRON 8 MG/1
8 TABLET, ORALLY DISINTEGRATING ORAL EVERY 8 HOURS PRN
Qty: 3 TABLET | Refills: 0 | Status: SHIPPED | OUTPATIENT
Start: 2024-08-13

## 2024-08-13 RX ORDER — LIDOCAINE HYDROCHLORIDE 10 MG/ML
1 INJECTION, SOLUTION EPIDURAL; INFILTRATION; INTRACAUDAL; PERINEURAL ONCE
Status: DISCONTINUED | OUTPATIENT
Start: 2024-08-13 | End: 2024-08-13 | Stop reason: HOSPADM

## 2024-08-13 RX ORDER — CEFAZOLIN SODIUM 2 G/50ML
2 SOLUTION INTRAVENOUS
Status: DISCONTINUED | OUTPATIENT
Start: 2024-08-13 | End: 2024-08-13 | Stop reason: HOSPADM

## 2024-08-13 RX ORDER — ONDANSETRON HYDROCHLORIDE 2 MG/ML
INJECTION, SOLUTION INTRAVENOUS
Status: DISCONTINUED | OUTPATIENT
Start: 2024-08-13 | End: 2024-08-13

## 2024-08-13 RX ORDER — BUPIVACAINE HYDROCHLORIDE 2.5 MG/ML
INJECTION, SOLUTION EPIDURAL; INFILTRATION; INTRACAUDAL
Status: DISCONTINUED | OUTPATIENT
Start: 2024-08-13 | End: 2024-08-13 | Stop reason: HOSPADM

## 2024-08-13 RX ORDER — SODIUM CHLORIDE, SODIUM LACTATE, POTASSIUM CHLORIDE, CALCIUM CHLORIDE 600; 310; 30; 20 MG/100ML; MG/100ML; MG/100ML; MG/100ML
INJECTION, SOLUTION INTRAVENOUS CONTINUOUS
Status: DISCONTINUED | OUTPATIENT
Start: 2024-08-13 | End: 2024-08-13 | Stop reason: HOSPADM

## 2024-08-13 RX ORDER — KETAMINE HCL IN 0.9 % NACL 50 MG/5 ML
SYRINGE (ML) INTRAVENOUS
Status: DISCONTINUED | OUTPATIENT
Start: 2024-08-13 | End: 2024-08-13

## 2024-08-13 RX ORDER — SODIUM CHLORIDE 0.9 % (FLUSH) 0.9 %
3 SYRINGE (ML) INJECTION
Status: DISCONTINUED | OUTPATIENT
Start: 2024-08-13 | End: 2024-08-13 | Stop reason: HOSPADM

## 2024-08-13 RX ORDER — MIDAZOLAM HYDROCHLORIDE 1 MG/ML
INJECTION INTRAMUSCULAR; INTRAVENOUS
Status: DISCONTINUED | OUTPATIENT
Start: 2024-08-13 | End: 2024-08-13

## 2024-08-13 RX ORDER — OXYCODONE HYDROCHLORIDE 5 MG/1
5 TABLET ORAL
Status: DISCONTINUED | OUTPATIENT
Start: 2024-08-13 | End: 2024-08-13 | Stop reason: HOSPADM

## 2024-08-13 RX ORDER — ACETAMINOPHEN 10 MG/ML
INJECTION, SOLUTION INTRAVENOUS
Status: DISCONTINUED | OUTPATIENT
Start: 2024-08-13 | End: 2024-08-13

## 2024-08-13 RX ORDER — PROPOFOL 10 MG/ML
VIAL (ML) INTRAVENOUS
Status: DISCONTINUED | OUTPATIENT
Start: 2024-08-13 | End: 2024-08-13

## 2024-08-13 RX ORDER — MUPIROCIN 20 MG/G
OINTMENT TOPICAL
Status: DISCONTINUED | OUTPATIENT
Start: 2024-08-13 | End: 2024-08-13 | Stop reason: HOSPADM

## 2024-08-13 RX ORDER — OXYCODONE HYDROCHLORIDE 5 MG/1
5 TABLET ORAL EVERY 4 HOURS PRN
Qty: 10 TABLET | Refills: 0 | Status: SHIPPED | OUTPATIENT
Start: 2024-08-13 | End: 2024-08-15

## 2024-08-13 RX ORDER — ROCURONIUM BROMIDE 10 MG/ML
INJECTION, SOLUTION INTRAVENOUS
Status: DISCONTINUED | OUTPATIENT
Start: 2024-08-13 | End: 2024-08-13

## 2024-08-13 RX ORDER — LIDOCAINE HYDROCHLORIDE 20 MG/ML
INJECTION INTRAVENOUS
Status: DISCONTINUED | OUTPATIENT
Start: 2024-08-13 | End: 2024-08-13

## 2024-08-13 RX ADMIN — SUGAMMADEX 200 MG: 100 INJECTION, SOLUTION INTRAVENOUS at 03:08

## 2024-08-13 RX ADMIN — ONDANSETRON 4 MG: 2 INJECTION, SOLUTION INTRAMUSCULAR; INTRAVENOUS at 02:08

## 2024-08-13 RX ADMIN — FENTANYL CITRATE 100 MCG: 50 INJECTION, SOLUTION INTRAMUSCULAR; INTRAVENOUS at 01:08

## 2024-08-13 RX ADMIN — ACETAMINOPHEN 1000 MG: 10 INJECTION, SOLUTION INTRAVENOUS at 02:08

## 2024-08-13 RX ADMIN — ROCURONIUM BROMIDE 50 MG: 10 INJECTION, SOLUTION INTRAVENOUS at 01:08

## 2024-08-13 RX ADMIN — SODIUM CHLORIDE, POTASSIUM CHLORIDE, SODIUM LACTATE AND CALCIUM CHLORIDE: 600; 310; 30; 20 INJECTION, SOLUTION INTRAVENOUS at 12:08

## 2024-08-13 RX ADMIN — EPHEDRINE SULFATE 15 MG: 50 INJECTION INTRAVENOUS at 02:08

## 2024-08-13 RX ADMIN — MUPIROCIN: 20 OINTMENT TOPICAL at 12:08

## 2024-08-13 RX ADMIN — GLYCOPYRROLATE 0.2 MG: 0.2 INJECTION, SOLUTION INTRAMUSCULAR; INTRAVENOUS at 02:08

## 2024-08-13 RX ADMIN — Medication 20 MG: at 01:08

## 2024-08-13 RX ADMIN — LIDOCAINE HYDROCHLORIDE 75 MG: 20 INJECTION INTRAVENOUS at 01:08

## 2024-08-13 RX ADMIN — MIDAZOLAM HYDROCHLORIDE 2 MG: 2 INJECTION, SOLUTION INTRAMUSCULAR; INTRAVENOUS at 01:08

## 2024-08-13 RX ADMIN — PROPOFOL 200 MG: 10 INJECTION, EMULSION INTRAVENOUS at 01:08

## 2024-08-13 NOTE — DISCHARGE INSTRUCTIONS
Procedure:  Right elbow arthroscopy with loose body removal and ulnar nerve decompression    1. Keep the splint clean, dry, and in place until follow-up. Do not take it off and do not get it wet.    2. Please keep the right upper extremity elevated for the 1st 24-48 hours to prevent further swelling    3. Flexion and extension of the exposed fingers is allowed but do not attempt to lift or push off with the right arm or hand    4. Pain medication and nausea medication have been prescribed. Please take them as necessary.    5. If there are any questions or concerns please call Dr. Barron's office at [phone]    6. Follow-up with Dr. Barron in 2 weeks

## 2024-08-13 NOTE — ANESTHESIA PREPROCEDURE EVALUATION
08/13/2024  Shelly Gannon Jr. is a 49 y.o., male.      Pre-op Assessment    I have reviewed the NPO Status.   I have reviewed the Medications.     Review of Systems  Anesthesia Hx:  No problems with previous Anesthesia                Cardiovascular:  Exercise tolerance: good   Hypertension                                  Hypertension         Pulmonary:  Pulmonary Normal                       Hepatic/GI:  Hepatic/GI Normal                 Neurological:    Neuromuscular Disease,  Headaches      Dx of Headaches                         Neuromuscular Disease   Endocrine:  Endocrine Normal                Physical Exam  General: Well nourished    Airway:  Mallampati: II / I  Mouth Opening: Normal  TM Distance: Normal  Tongue: Normal  Neck ROM: Normal ROM    Dental:  Intact    Chest/Lungs:  Clear to auscultation, Normal Respiratory Rate        Anesthesia Plan  Type of Anesthesia, risks & benefits discussed:    Anesthesia Type: Gen ETT  Intra-op Monitoring Plan: Standard ASA Monitors  Post Op Pain Control Plan: multimodal analgesia and IV/PO Opioids PRN  Induction:  IV  Airway Plan: Direct, Post-Induction  Informed Consent: Informed consent signed with the Patient and all parties understand the risks and agree with anesthesia plan.  All questions answered.   ASA Score: 2    Ready For Surgery From Anesthesia Perspective.     .

## 2024-08-13 NOTE — DISCHARGE SUMMARY
[facility] - Surgery  Discharge Note  Short Stay    Procedure(s) (LRB):  Right elbow arthroscopy with loose body removal (Right)  Right cubital tunnel release (Right)      OUTCOME: Patient tolerated treatment/procedure well without complication and is now ready for discharge.    DISPOSITION: Home or Self Care    FINAL DIAGNOSIS:  Loose body of elbow, right    FOLLOWUP: In clinic    DISCHARGE INSTRUCTIONS:    Discharge Procedure Orders   SLING ORTHOPEDIC LARGE FOR HOME USE     Diet general     Activity as tolerated     Keep surgical extremity elevated     Lifting restrictions   Order Comments: Please do not lift or push off with the right arm or hand     Leave dressing on - Keep it clean, dry, and intact until clinic visit     Call MD for:  temperature >100.4     Call MD for:  persistent nausea and vomiting     Call MD for:  severe uncontrolled pain     Call MD for:  difficulty breathing, headache or visual disturbances     Call MD for:  redness, tenderness, or signs of infection (pain, swelling, redness, odor or green/yellow discharge around incision site)     Call MD for:  hives     Call MD for:  persistent dizziness or light-headedness     Call MD for:  extreme fatigue        TIME SPENT ON DISCHARGE: 15 minutes

## 2024-08-13 NOTE — OP NOTE
Shelly Gannon Jr.  1975    DATE OF SURGERY: 8/13/2024     PRE-OPERATIVE DIAGNOSIS:  Right elbow loose bodies, right ulnar neuropathy    POST-OPERATIVE DIAGNOSIS: , right elbow loose bodies, right elbow synovitis, right ulnar neuropathy/cubital tunnel syndrome    ANESTHESIA TYPE:  General    BLOOD LOSS:  10-15 cc    TOURNIQUET TIME:  54 minutes    SURGEON: Dr Barron    ASSISTANT:  Faith Kessler    PROCEDURE:   1.  Right elbow arthroscopy with extensive synovectomy   2.  Right elbow removal of loose bodies   3.  Right elbow cubital tunnel release/ulnar nerve decompression in-situ    IMPLANTS:  None     SPECIMENS:  None    INDICATION:     Mr. Gannon has a history of mechanical symptoms and pain to his right elbow.  He also had symptoms of cubital tunnel syndrome.  Due to the amount of symptoms that he was having I discussed the possibility of loose body removal with debridement of the joint and ulnar nerve decompression.  Informed consent was then obtained    PROCEDURE IN DETAIL:     Mr. Gannon was transported to the operating room and was placed supine on the operating room table. All appropriate points were padded. The right arm and hand was prepped and draped in the normal sterile fashion. Time out was called. The correct patient, correct operative site, correct procedure, antibiotic administration which consisted of 2 g of Ancef, and allergies to medications which are to Ace inhibitors, Tramadol, and Tylenol [acetaminophen]  were reviewed. Time in was then called.     Attention was turned to the right elbow.  An anteromedial arthroscopic portal was established.  The arthroscope was advanced into the elbow joint.  Diagnostic arthroscopy of the anterior elbow was performed.  There was noted to be a large amount of synovitis as well as multiple loose bodies.  A lateral portal was established.  This was just anterior to the radial head and radial capitellar joint.  An arthroscopic shaver was introduced  into the portal.  Debridement of the synovitis and capsule was performed.  An extensive debridement was required for removal of all the synovitis.  There were multiple loose bodies noted.  Three loose bodies were removed with grasper through the lateral portal.  There was a large loose body which floated from the coronoid fossa into the medial gutter.  The arthroscope was then transitioned into the medial portal.  A grasper was placed from the lateral portal and the loose body was able to be removed.  Further debridement was performed.  There was no remaining loose bodies or significant synovitis remaining.  The arthroscope was then withdrawn.      Attention was turned to the cubital tunnel.  A 5-6 cm incision was made directly over the cubital tunnel.  The incision was carried through the skin.  Subcutaneous tissues were dissected with tenotomy scissors.  The ulnar nerve was identified proximally.  It was traced through the region of the cubital tunnel and the heads of the FCU.  A complete release of the nerve was performed.  With the nerve completely released the elbow was taken through range of motion.  There was noted to be no further impingement of the nerve and there was no significant subluxation.  At that point the medial wound was copiously irrigated.  The tourniquet was let down and hemostasis was obtained.  The medial wound was closed with combination of 3-0 Vicryl subcutaneous sutures and 3-0 nylon superficial sutures.  The arthroscopic portals were closed with 4-0 nylon suture.  The wounds were all dressed with Xeroform, gauze padding, cast padding and a long-arm posterior splint was placed.    The patient was awakened from anesthesia and was transported to the recovery room in stable condition. All lap, needle, sponge, and equipment counts were correct at the end of the case.    POST-OPERATIVE PLAN:     Patient will keep the splint in place full time.  He will follow up in 2 weeks which point I will go  to sling wear and begin range of motion.

## 2024-08-13 NOTE — ANESTHESIA POSTPROCEDURE EVALUATION
Anesthesia Post Evaluation    Patient: Shelly Gannon Jr.    Procedure(s) Performed: Procedure(s) (LRB):  Right elbow arthroscopy with loose body removal (Right)  Right cubital tunnel release (Right)    Final Anesthesia Type: general      Patient location during evaluation: PACU  Patient participation: Yes- Able to Participate  Level of consciousness: awake and alert and oriented  Post-procedure vital signs: reviewed and stable  Pain management: adequate  Airway patency: patent  ELISEO mitigation strategies: Multimodal analgesia, Extubation while patient is awake, Verification of full reversal of neuromuscular block and Extubation and recovery carried out in lateral, semiupright, or other nonsupine position  PONV status at discharge: No PONV  Anesthetic complications: no      Cardiovascular status: blood pressure returned to baseline  Respiratory status: unassisted, spontaneous ventilation and room air  Hydration status: euvolemic  Follow-up not needed.              Vitals Value Taken Time   /80 08/13/24 1557   Temp  08/13/24 1605   Pulse 77 08/13/24 1557   Resp 28 08/13/24 1557   SpO2 95 % 08/13/24 1557         Event Time   Out of Recovery 15:39:00         Pain/Lobo Score: Lobo Score: 10 (8/13/2024  3:44 PM)

## 2024-08-13 NOTE — ANESTHESIA PROCEDURE NOTES
Intubation    Date/Time: 8/13/2024 1:37 PM    Performed by: Gwen Juarez CRNA  Authorized by: Joselo Vigil MD    Intubation:     Induction:  Intravenous    Intubated:  Postinduction    Mask Ventilation:  Easy with oral airway    Attempts:  1    Attempted By:  CRNA    Method of Intubation:  Video laryngoscopy    Blade:  Gupta 4    Laryngeal View Grade: Grade I - full view of cords      Difficult Airway Encountered?: No      Complications:  None    Airway Device:  Oral endotracheal tube    Airway Device Size:  8.0    Style/Cuff Inflation:  Cuffed    Inflation Amount (mL):  5    Placement Verified By:  Capnometry and Revisualization with laryngoscopy    Complicating Factors:  None    Findings Post-Intubation:  BS equal bilateral and atraumatic/condition of teeth unchanged

## 2024-08-13 NOTE — TRANSFER OF CARE
Anesthesia Transfer of Care Note    Patient: Shelly Gannon Jr.    Procedure(s) Performed: Procedure(s) (LRB):  Right elbow arthroscopy with loose body removal (Right)  Right cubital tunnel release (Right)    Patient location: PACU    Anesthesia Type: general    Transport from OR: Transported from OR on room air with adequate spontaneous ventilation    Post pain: adequate analgesia    Post assessment: no apparent anesthetic complications    Post vital signs: stable    Level of consciousness: awake    Nausea/Vomiting: no nausea/vomiting    Complications: none    Transfer of care protocol was followed      Last vitals: Visit Vitals  BP (!) 153/80   Pulse 77   Temp 36.5 °C (97.7 °F) (Skin)   Resp (!) 28   Ht 6' (1.829 m)   Wt 105.7 kg (233 lb)   SpO2 95%   BMI 31.60 kg/m²

## 2024-08-15 ENCOUNTER — TELEPHONE (OUTPATIENT)
Dept: ORTHOPEDICS | Facility: CLINIC | Age: 49
End: 2024-08-15
Payer: MEDICAID

## 2024-08-15 DIAGNOSIS — Z98.890 STATUS POST SURGERY: Primary | ICD-10-CM

## 2024-08-15 RX ORDER — OXYCODONE HYDROCHLORIDE 15 MG/1
15 TABLET ORAL EVERY 4 HOURS PRN
Qty: 10 TABLET | Refills: 0 | Status: SHIPPED | OUTPATIENT
Start: 2024-08-15

## 2024-08-15 NOTE — TELEPHONE ENCOUNTER
Spoke to patient. Let him know that [facility] is sending him in 15mg of oxycodone. Emphasized that he should never take two of these and to make sure he only takes one. Mr. Gannon verbalized understanding.

## 2024-08-15 NOTE — TELEPHONE ENCOUNTER
Spoke with pharmacy and clarified prescription  ----- Message from Ronnie Donald sent at 8/15/2024  4:46 PM CDT -----  Type:  Pharmacy Calling to Clarify an RX    Name of Caller:  Alexy ./ Katie  Pharmacy Name:        [id number] - [address] & [address] [address]  Phone: [phone] Fax: [phone]      Prescription Name:  oxyCODONE (ROXICODONE) 15 MG Tab  What do they need to clarify?:  Needs diagnosis code  Best Call Back Number:  [phone]  Additional Information:

## 2024-08-15 NOTE — PROGRESS NOTES
I discussed with Shelly Gannon Jr. over the phone that I have increase the dosage of his pain medication.  We did discuss that his new pain medication is 3 times stronger than his current pain medication.  We did discuss in detail the importance of just taking 1 tablet every 4 hours as needed for pain.  We did discuss if he takes more than 1 tablet at a time this may cause negative outcomes such as respiratory distress/overdose.  He verbalized understanding and verbally agreed.  He was instructed to contact clinic for any problems or concerns in the interim until his appointment.

## 2024-08-28 ENCOUNTER — OFFICE VISIT (OUTPATIENT)
Dept: ORTHOPEDICS | Facility: CLINIC | Age: 49
End: 2024-08-28
Payer: MEDICAID

## 2024-08-28 DIAGNOSIS — Z98.890 STATUS POST SURGERY: Primary | ICD-10-CM

## 2024-08-28 DIAGNOSIS — M24.021 LOOSE BODY OF ELBOW, RIGHT: ICD-10-CM

## 2024-08-28 DIAGNOSIS — G56.21 CUBITAL TUNNEL SYNDROME ON RIGHT: ICD-10-CM

## 2024-08-28 PROCEDURE — 99999 PR PBB SHADOW E&M-EST. PATIENT-LVL II: CPT | Mod: PBBFAC,,, | Performed by: ORTHOPAEDIC SURGERY

## 2024-08-28 PROCEDURE — 99212 OFFICE O/P EST SF 10 MIN: CPT | Mod: PBBFAC,PO | Performed by: ORTHOPAEDIC SURGERY

## 2024-08-28 PROCEDURE — 1159F MED LIST DOCD IN RCRD: CPT | Mod: CPTII,,, | Performed by: ORTHOPAEDIC SURGERY

## 2024-08-28 PROCEDURE — 99024 POSTOP FOLLOW-UP VISIT: CPT | Mod: ,,, | Performed by: ORTHOPAEDIC SURGERY

## 2024-08-28 NOTE — PROGRESS NOTES
Mr Gannon returns to clinic today.  Has a history of right elbow loose bodies with ulnar nerve pathology.  He underwent loose body removal and elbow arthroscopy and cubital tunnel release.  He was here today 2 weeks postop and doing relatively well     Physical exam: Examination of the right elbow reveals that the incisions are all healing very well.  There are sutures in place.  There is only mild edema.  There was no erythema or drainage.  Flexion and extension of the elbow reveals that he was missing approximately 20° from full extension is able to flex to 130°.  He does have intact sensation in the ulnar distribution.  He was able make full composite fist and fully extend.      Assessment: Status post right elbow loose body removal, synovectomy, and ulnar nerve decompression     Plan:     1.  Sutures were removed today and Steri-Strips are placed     2.  Will continue a home range of motion program     3.  Will continue limited to nonweightbearing     4.  He will wear his sling for activity    5.  Will follow up in 2 weeks for repeat evaluation.  At that time we will discuss any need for formal therapy and consider beginning to increase his activity

## 2024-09-12 DIAGNOSIS — I10 HYPERTENSION, UNSPECIFIED TYPE: ICD-10-CM

## 2024-09-12 RX ORDER — METOPROLOL SUCCINATE 25 MG/1
25 TABLET, EXTENDED RELEASE ORAL
Qty: 90 TABLET | Refills: 1 | Status: SHIPPED | OUTPATIENT
Start: 2024-09-12

## 2024-09-12 NOTE — TELEPHONE ENCOUNTER
Care Due:                  Date            Visit Type   Department     Provider  --------------------------------------------------------------------------------                                SAME DAY -                              ESTABLISHED   [facility]  Last Visit: 03-      PATIENT      MEDICINE       Negar Strong  Next Visit: None Scheduled  None         None Found                                                            Last  Test          Frequency    Reason                     Performed    Due Date  --------------------------------------------------------------------------------    CMP.........  12 months..  losartan.................  09- 09-    [facility] Embedded Care Due Messages. Reference number: [id number].   9/12/2024 12:51:45 PM CDT

## 2024-09-12 NOTE — TELEPHONE ENCOUNTER
Refill Routing Note   Medication(s) are not appropriate for processing by [facility] for the following reason(s):        Required vitals abnormal  No active prescription written by provider    [facility] action(s):  Defer     Requires labs : Yes             Appointments  past 12m or future 3m with PCP    Date Provider   Last Visit   3/13/2024 Negar Strong MD   Next Visit   Visit date not found Negar Strong MD   ED visits in past 90 days: 0        Note composed:2:44 PM 09/12/2024

## 2024-09-18 DIAGNOSIS — I10 ESSENTIAL HYPERTENSION: ICD-10-CM

## 2024-09-21 DIAGNOSIS — K21.9 GASTROESOPHAGEAL REFLUX DISEASE, UNSPECIFIED WHETHER ESOPHAGITIS PRESENT: ICD-10-CM

## 2024-09-23 RX ORDER — PANTOPRAZOLE SODIUM 40 MG/1
40 TABLET, DELAYED RELEASE ORAL
Qty: 30 TABLET | Refills: 11 | Status: SHIPPED | OUTPATIENT
Start: 2024-09-23

## 2024-09-25 ENCOUNTER — TELEPHONE (OUTPATIENT)
Dept: ORTHOPEDICS | Facility: CLINIC | Age: 49
End: 2024-09-25
Payer: MEDICAID

## 2024-09-25 NOTE — TELEPHONE ENCOUNTER
Spoke with caller and letter put into chart.   ----- Message from Ronnie Donald sent at 9/25/2024 12:37 PM CDT -----  Patient would like a callback regarding needing a return to work letter.     [phone]

## 2025-01-11 ENCOUNTER — HOSPITAL ENCOUNTER (EMERGENCY)
Facility: HOSPITAL | Age: 50
Discharge: HOME | End: 2025-01-11
Attending: EMERGENCY MEDICINE
Payer: MEDICAID

## 2025-01-11 VITALS
DIASTOLIC BLOOD PRESSURE: 110 MMHG | TEMPERATURE: 98 F | HEART RATE: 71 BPM | OXYGEN SATURATION: 97 % | BODY MASS INDEX: 31.56 KG/M2 | RESPIRATION RATE: 18 BRPM | HEIGHT: 72 IN | SYSTOLIC BLOOD PRESSURE: 187 MMHG | WEIGHT: 233 LBS

## 2025-01-11 DIAGNOSIS — R13.10 DYSPHAGIA, UNSPECIFIED TYPE: Primary | ICD-10-CM

## 2025-01-11 LAB
GROUP A STREP, MOLECULAR: NEGATIVE
INFLUENZA A, MOLECULAR: NEGATIVE
INFLUENZA B, MOLECULAR: NEGATIVE
SARS-COV-2 RDRP RESP QL NAA+PROBE: NEGATIVE
SPECIMEN SOURCE: NORMAL

## 2025-01-11 PROCEDURE — 87502 INFLUENZA DNA AMP PROBE: CPT | Performed by: EMERGENCY MEDICINE

## 2025-01-11 PROCEDURE — 87635 SARS-COV-2 COVID-19 AMP PRB: CPT | Performed by: EMERGENCY MEDICINE

## 2025-01-11 PROCEDURE — 99282 EMERGENCY DEPT VISIT SF MDM: CPT

## 2025-01-11 PROCEDURE — 87651 STREP A DNA AMP PROBE: CPT | Performed by: EMERGENCY MEDICINE

## 2025-01-11 RX ORDER — FLUTICASONE PROPIONATE 50 MCG
1 SPRAY, SUSPENSION (ML) NASAL 2 TIMES DAILY PRN
Qty: 15 G | Refills: 0 | Status: SHIPPED | OUTPATIENT
Start: 2025-01-11

## 2025-01-12 NOTE — ED PROVIDER NOTES
Encounter Date: 1/11/2025       History     Chief Complaint   Patient presents with    throat swelling      X 2 days / recent use of VICKs inhaler      This is a 49-year-old male complaining throat discomfort, difficulty swallowing.  He says this began 3 or 4 days ago.  He also reports nasal congestion, has been using fixed nasal spray.  He denies significant throat pain or painful swallowing but says when he swallows it feels like his food is getting caught.  He does report history of GERD and is on a PPI.  He denies fever or cough.    The history is provided by the patient.     Review of patient's allergies indicates:   Allergen Reactions    Ace inhibitors Other (See Comments)     cough    Tramadol Itching    Tylenol [acetaminophen] Other (See Comments)     cough     Past Medical History:   Diagnosis Date    Allergy     Anxiety     Hypertension     Migraine headache     Migraine syndrome      Past Surgical History:   Procedure Laterality Date    ARTHROSCOPY OF ELBOW Right 8/13/2024    Procedure: Right elbow arthroscopy with loose body removal;  Surgeon: Aiden Barron MD;  Location: [facility] OR;  Service: Orthopedics;  Laterality: Right;    BACK SURGERY      FRACTURE SURGERY      LARYNGOSCOPY N/A 8/18/2022    Procedure: Suspension microlaryngoscopy with KTP laser assisted excision of lesion;  Surgeon: José Wiggins MD;  Location: [address];  Service: ENT;  Laterality: N/A;  Microscope, telescopes, tower, microinstruments, KTP laser; laser confirmed on 8/18 at 10am #[id number] (cL8/12)    LEFT HEART CATHETERIZATION Left 9/19/2019    Procedure: CATHETERIZATION, HEART, LEFT  (RIGHT RADIAL ACCESS);  Surgeon: Ronnie Bailey MD;  Location: [facility] CATH/EP LAB;  Service: Cardiology;  Laterality: Left;    LUMBAR DISC SURGERY Bilateral     L4- L5 , 2016 AprilDr Barahona    RELEASE OF ULNAR NERVE AT CUBITAL TUNNEL Right 8/13/2024    Procedure: Right cubital tunnel release;  Surgeon: Aiden Barron MD;   Location: [facility] OR;  Service: Orthopedics;  Laterality: Right;    REPAIR,TENDON,DISTAL PROXIMAL Left 3/23/2023    Procedure: REPAIR,TENDON,DISTAL PROXIMAL;  Surgeon: Cornell Chan MD;  Location: [facility] OR;  Service: Orthopedics;  Laterality: Left;    VOCAL FOLD LESION EXCISION  2008     Family History   Problem Relation Name Age of Onset    Allergic rhinitis Mother      Hypertension Mother      Heart disease Mother      Stroke Mother      Heart disease Father  49    Cancer Maternal Aunt          breast    Heart disease Maternal Aunt      Angioedema Neg Hx      Asthma Neg Hx      Atopy Neg Hx      Eczema Neg Hx      Immunodeficiency Neg Hx      Urticaria Neg Hx       Social History     Tobacco Use    Smoking status: Never    Smokeless tobacco: Never   Substance Use Topics    Alcohol use: No    Drug use: No     Review of Systems   HENT:  Positive for congestion and trouble swallowing.    All other systems reviewed and are negative.      Physical Exam     Initial Vitals [01/11/25 1812]   BP Pulse Resp Temp SpO2   (!) 176/99 69 18 98.4 °F (36.9 °C) 98 %      MAP       --         Physical Exam    Nursing note and vitals reviewed.  Constitutional: He appears well-developed and well-nourished. He is not diaphoretic. No distress.   HENT:   Head: Normocephalic and atraumatic. Mouth/Throat: Oropharynx is clear and moist.   Posterior pharynx is clear, no visible swelling.  No facial or submental swelling.  Normal voice.  Maintaining secretions.   Eyes: Conjunctivae are normal.   Neck: Neck supple. No tracheal deviation present.   Trachea is midline, no swelling, no stridor   Normal range of motion.  Cardiovascular:  Normal rate.           Pulmonary/Chest: No stridor. No respiratory distress.   Abdominal: He exhibits no distension.   Musculoskeletal:         General: No edema.      Cervical back: Normal range of motion and neck supple.     Neurological: He is alert. He has normal strength.   Skin: Skin is warm and dry. No  rash noted. No erythema.   Psychiatric: He has a normal mood and affect.         ED Course   Procedures  Labs Reviewed   INFLUENZA A & B BY MOLECULAR       Result Value    Influenza A, Molecular Negative      Influenza B, Molecular Negative      Flu A & B Source Nasal swab     GROUP A STREP, MOLECULAR    Group A Strep, Molecular Negative     SARS-COV-2 RNA AMPLIFICATION, QUAL    SARS-CoV-2 RNA, Amplification, Qual Negative            Imaging Results    None          Medications - No data to display  Medical Decision Making  Patient's exam is normal, no facial or pharyngeal swelling or edema, no stridor, no evidence of impending airway compromise.  He is very nontoxic in appearance and there is no evidence for esophageal obstruction or significant oropharyngeal swelling/infection such as epiglottitis on exam.  I do not think he needs advanced imaging or further diagnostic evaluation at this time.  Could be due to reflux esophagitis, also possibly due to postnasal drip pharyngitis.  I will write him for Flonase, advised PCP follow up, will also refer to GI clinic.                                      Clinical Impression:  Final diagnoses:  [R13.10] Dysphagia, unspecified type (Primary)          ED Disposition Condition    Discharge Stable          ED Prescriptions       Medication Sig Dispense Start Date End Date Auth. Provider    fluticasone propionate (FLONASE) 50 mcg/actuation nasal spray 1 spray (50 mcg total) by Each Nostril route 2 (two) times daily as needed for Rhinitis. 15 g 1/11/2025 -- Lopez Livingston MD          Follow-up Information       Follow up With Specialties Details Why Contact Info    Negar Strong MD Family Medicine   [address] [facility] [address]  [phone]      Chito Cox DO Gastroenterology   [address] [facility] [address] [place] [address]  [phone]               Lopez Livingston MD  01/11/25 4554

## 2025-01-12 NOTE — DISCHARGE INSTRUCTIONS
Return to the ER for worsening symptoms or for any other concerns.  Follow up with your PCP and gastroenterology Clinics.

## 2025-01-21 ENCOUNTER — NURSE TRIAGE (OUTPATIENT)
Dept: ADMINISTRATIVE | Facility: CLINIC | Age: 50
End: 2025-01-21
Payer: MEDICAID

## 2025-01-21 NOTE — TELEPHONE ENCOUNTER
Patient reports feeling as if his throat is tight and he is unable to swallow. Advised, per protocol and verbalizes understanding.    Reason for Disposition   Difficulty swallowing, drooling or slurred speech    Protocols used: [id number]     [FreeTextEntry1] : ET with robust left VIM DBS response\par \par Patient was counseled on the following recommendations:\par \par - DBS adjusted\par - will leave primidone unchanged as it helps her left hand\par \par f/u 4-6 weeks

## 2025-01-22 ENCOUNTER — HOSPITAL ENCOUNTER (EMERGENCY)
Facility: HOSPITAL | Age: 50
Discharge: HOME | End: 2025-01-22
Attending: EMERGENCY MEDICINE
Payer: MEDICAID

## 2025-01-22 VITALS
HEIGHT: 72 IN | DIASTOLIC BLOOD PRESSURE: 108 MMHG | HEART RATE: 78 BPM | SYSTOLIC BLOOD PRESSURE: 164 MMHG | TEMPERATURE: 99 F | RESPIRATION RATE: 18 BRPM | BODY MASS INDEX: 32.51 KG/M2 | WEIGHT: 240 LBS | OXYGEN SATURATION: 98 %

## 2025-01-22 DIAGNOSIS — R13.10 DYSPHAGIA, UNSPECIFIED TYPE: Primary | ICD-10-CM

## 2025-01-22 PROCEDURE — 96372 THER/PROPH/DIAG INJ SC/IM: CPT | Performed by: EMERGENCY MEDICINE

## 2025-01-22 PROCEDURE — 99284 EMERGENCY DEPT VISIT MOD MDM: CPT | Mod: 25

## 2025-01-22 PROCEDURE — 63600175 PHARM REV CODE 636 W HCPCS: Performed by: EMERGENCY MEDICINE

## 2025-01-22 RX ORDER — DEXAMETHASONE SODIUM PHOSPHATE 4 MG/ML
8 INJECTION, SOLUTION INTRA-ARTICULAR; INTRALESIONAL; INTRAMUSCULAR; INTRAVENOUS; SOFT TISSUE
Status: COMPLETED | OUTPATIENT
Start: 2025-01-22 | End: 2025-01-22

## 2025-01-22 RX ORDER — METHYLPREDNISOLONE 4 MG/1
TABLET ORAL
Qty: 1 EACH | Refills: 0 | Status: SHIPPED | OUTPATIENT
Start: 2025-01-22

## 2025-01-22 RX ADMIN — DEXAMETHASONE SODIUM PHOSPHATE 8 MG: 4 INJECTION, SOLUTION INTRA-ARTICULAR; INTRALESIONAL; INTRAMUSCULAR; INTRAVENOUS; SOFT TISSUE at 08:01

## 2025-01-23 ENCOUNTER — TELEPHONE (OUTPATIENT)
Dept: GASTROENTEROLOGY | Facility: CLINIC | Age: 50
End: 2025-01-23
Payer: MEDICAID

## 2025-01-23 NOTE — TELEPHONE ENCOUNTER
Spoke with pt. Pt would like to have appointment  in [place] as this is more convenient for them. Pt informed their message would be sent to [place] [facility] for scheduling and they would be hearing back soon. Pt verbalized understanding

## 2025-01-23 NOTE — ED PROVIDER NOTES
Encounter Date: 1/22/2025       History     Chief Complaint   Patient presents with    throat pain     Pt states that he has been having a feeling sensation of a lump in his throat for a few weeks, its not getting any better     HPI patient is a 49-year-old man who presents emergency department for evaluation of a lump in his throat for several weeks.  He states he feels it when he swallowing.  Denies any recent illness.  Does have a history of allergies.  Recently seen in the emergency department and prescribed nasal sprays which are not helping.  Denies any sore throat or fever.  Had recent strep test that was also negative.  Review of patient's allergies indicates:   Allergen Reactions    Ace inhibitors Other (See Comments)     cough    Tramadol Itching    Tylenol [acetaminophen] Other (See Comments)     cough     Past Medical History:   Diagnosis Date    Allergy     Anxiety     Hypertension     Migraine headache     Migraine syndrome      Past Surgical History:   Procedure Laterality Date    ARTHROSCOPY OF ELBOW Right 8/13/2024    Procedure: Right elbow arthroscopy with loose body removal;  Surgeon: Aiden Barron MD;  Location: [facility] OR;  Service: Orthopedics;  Laterality: Right;    BACK SURGERY      FRACTURE SURGERY      LARYNGOSCOPY N/A 8/18/2022    Procedure: Suspension microlaryngoscopy with KTP laser assisted excision of lesion;  Surgeon: José Wiggins MD;  Location: [address];  Service: ENT;  Laterality: N/A;  Microscope, telescopes, tower, microinstruments, KTP laser; laser confirmed on 8/18 at 10am #[id number] (cL8/12)    LEFT HEART CATHETERIZATION Left 9/19/2019    Procedure: CATHETERIZATION, HEART, LEFT  (RIGHT RADIAL ACCESS);  Surgeon: Ronnie Bailey MD;  Location: [facility] CATH/EP LAB;  Service: Cardiology;  Laterality: Left;    LUMBAR DISC SURGERY Bilateral     L4- L5 , 2016 April, Dr Barahona    RELEASE OF ULNAR NERVE AT CUBITAL TUNNEL Right 8/13/2024    Procedure: Right cubital tunnel  release;  Surgeon: Aiden Barron MD;  Location: [facility] OR;  Service: Orthopedics;  Laterality: Right;    REPAIR,TENDON,DISTAL PROXIMAL Left 3/23/2023    Procedure: REPAIR,TENDON,DISTAL PROXIMAL;  Surgeon: Cornell Chan MD;  Location: [facility] OR;  Service: Orthopedics;  Laterality: Left;    VOCAL FOLD LESION EXCISION  2008     Family History   Problem Relation Name Age of Onset    Allergic rhinitis Mother      Hypertension Mother      Heart disease Mother      Stroke Mother      Heart disease Father  49    Cancer Maternal Aunt          breast    Heart disease Maternal Aunt      Angioedema Neg Hx      Asthma Neg Hx      Atopy Neg Hx      Eczema Neg Hx      Immunodeficiency Neg Hx      Urticaria Neg Hx       Social History     Tobacco Use    Smoking status: Never    Smokeless tobacco: Never   Substance Use Topics    Alcohol use: No    Drug use: No     Review of Systems   Constitutional:  Negative for fever.   HENT:  Positive for trouble swallowing. Negative for sore throat.    Respiratory:  Negative for shortness of breath.    Cardiovascular:  Negative for chest pain.   Gastrointestinal:  Negative for nausea.   Genitourinary:  Negative for dysuria.   Musculoskeletal:  Negative for back pain.   Skin:  Negative for rash.   Neurological:  Negative for weakness.   Hematological:  Does not bruise/bleed easily.       Physical Exam     Initial Vitals [01/22/25 1812]   BP Pulse Resp Temp SpO2   (!) 164/108 78 18 99 °F (37.2 °C) 98 %      MAP       --         Physical Exam    Nursing note and vitals reviewed.  Constitutional: He appears well-developed and well-nourished.   HENT:   Head: Normocephalic and atraumatic.   Nose: Mucosal edema present. Mouth/Throat: Oropharynx is clear and moist. No oropharyngeal exudate.   Eyes: EOM are normal. Pupils are equal, round, and reactive to light.   Neck: Neck supple. No thyromegaly present. No tracheal deviation present. No JVD present.   Normal range of motion.  Pulmonary/Chest:  No stridor. No respiratory distress.   Musculoskeletal:         General: Normal range of motion.      Cervical back: Normal range of motion and neck supple.     Lymphadenopathy:     He has no cervical adenopathy.   Neurological: He is alert and oriented to person, place, and time.   Skin: Skin is warm and dry.         ED Course   Procedures  Labs Reviewed - No data to display       Imaging Results    None          Medications   dexAMETHasone injection 8 mg (8 mg Intramuscular Given 1/22/25 2005)     Medical Decision Making  Patient with dysphagia/globus sensation in his throat.  Physical exam unremarkable without evidence of neck mass, no enlarged thyroid or goiter, trachea midline without tenderness.  No stridor or hoarse voice.  I do not think he has on the differential epiglottitis, significant hyperthyroidism, lymphadenopathy, foreign body, angioedema.  Does have nasal allergies with postnasal drip.  Suggest taking antacid medication, will treat with steroids and referral to GI.    Risk  Prescription drug management.                                      Clinical Impression:  Final diagnoses:  [R13.10] Dysphagia, unspecified type (Primary)          ED Disposition Condition    Discharge Stable          ED Prescriptions       Medication Sig Dispense Start Date End Date Auth. Provider    methylPREDNISolone (MEDROL DOSEPACK) 4 mg tablet Take as directed on package 1 each 1/22/2025 -- Cornell Ruggiero MD          Follow-up Information       Follow up With Specialties Details Why Contact Info Additional Information    [facility] - Emergency Dept Emergency Medicine  As needed, If symptoms worsen [address] [facility] [id number] [phone] 1st floor    Negar Strong MD Family Medicine  As needed [address] [facility] [address]  [phone]                Cornell Ruggiero MD  01/23/25 9737

## 2025-01-23 NOTE — TELEPHONE ENCOUNTER
----- Message from Shaye sent at 1/23/2025  3:56 PM CST -----  Type:  Needs Medical Advice    Who Called: pt     Would the patient rather a call back or a response via [facility]? Call back     Best Call Back Number: [phone]      Additional Information:pt has a referral already in the system and needs and appt for next week for the [place] location pt is having trouble swollen foods and liquids     Please call back to advise. Thanks!

## 2025-01-23 NOTE — TELEPHONE ENCOUNTER
Returned call no answer unable to schedule payor at this time. Can try main campus or check with insurance

## 2025-02-21 ENCOUNTER — HOSPITAL ENCOUNTER (EMERGENCY)
Facility: HOSPITAL | Age: 50
Discharge: HOME | End: 2025-02-21
Attending: STUDENT IN AN ORGANIZED HEALTH CARE EDUCATION/TRAINING PROGRAM
Payer: MEDICAID

## 2025-02-21 VITALS
WEIGHT: 231 LBS | RESPIRATION RATE: 16 BRPM | SYSTOLIC BLOOD PRESSURE: 159 MMHG | BODY MASS INDEX: 31.29 KG/M2 | TEMPERATURE: 98 F | HEART RATE: 69 BPM | OXYGEN SATURATION: 98 % | DIASTOLIC BLOOD PRESSURE: 99 MMHG | HEIGHT: 72 IN

## 2025-02-21 DIAGNOSIS — R13.10 DYSPHAGIA, UNSPECIFIED TYPE: Primary | ICD-10-CM

## 2025-02-21 PROCEDURE — 99284 EMERGENCY DEPT VISIT MOD MDM: CPT | Mod: 25

## 2025-02-21 PROCEDURE — 63600175 PHARM REV CODE 636 W HCPCS: Performed by: STUDENT IN AN ORGANIZED HEALTH CARE EDUCATION/TRAINING PROGRAM

## 2025-02-21 PROCEDURE — 96372 THER/PROPH/DIAG INJ SC/IM: CPT | Performed by: STUDENT IN AN ORGANIZED HEALTH CARE EDUCATION/TRAINING PROGRAM

## 2025-02-21 RX ORDER — DEXAMETHASONE SODIUM PHOSPHATE 4 MG/ML
8 INJECTION, SOLUTION INTRA-ARTICULAR; INTRALESIONAL; INTRAMUSCULAR; INTRAVENOUS; SOFT TISSUE
Status: COMPLETED | OUTPATIENT
Start: 2025-02-21 | End: 2025-02-21

## 2025-02-21 RX ORDER — METHYLPREDNISOLONE 4 MG/1
TABLET ORAL
Qty: 21 EACH | Refills: 0 | Status: SHIPPED | OUTPATIENT
Start: 2025-02-21 | End: 2025-03-14

## 2025-02-21 RX ADMIN — DEXAMETHASONE SODIUM PHOSPHATE 8 MG: 4 INJECTION, SOLUTION INTRA-ARTICULAR; INTRALESIONAL; INTRAMUSCULAR; INTRAVENOUS; SOFT TISSUE at 03:02

## 2025-02-21 NOTE — ED PROVIDER NOTES
Encounter Date: 2/21/2025       History     Chief Complaint   Patient presents with    Dysphagia     Patient states he woke up unable to swallow saliva, had this problem recently and was seen by GI.  Is schedule to have EGD     HPI    Shelly Gannon Jr. is a 49 y.o. male with a past medical history of recurrent dysphagia that presents emergency department for difficulty swallowing his saliva.  Patient was recently seen twice in the emergency department for this last month.  During 1 visit, he had a shot of Decadron which significantly improved his symptoms.  He was started on a Medrol Dosepak as well.  Referred to Gastroenterology and has an appointment next month for EGD.  Woke up this morning and having difficulty swallowing his saliva.  He is able to drink some water.  Denies chest pain, shortness of breath, nausea, vomiting, fevers, and dizziness.  No changes in bowel habits or bladder habits.  No voice changes or facial swelling.    Review of patient's allergies indicates:   Allergen Reactions    Ace inhibitors Other (See Comments)     cough    Tramadol Itching    Tylenol [acetaminophen] Other (See Comments)     cough     Past Medical History:   Diagnosis Date    Allergy     Anxiety     Hypertension     Migraine headache     Migraine syndrome      Past Surgical History:   Procedure Laterality Date    ARTHROSCOPY OF ELBOW Right 8/13/2024    Procedure: Right elbow arthroscopy with loose body removal;  Surgeon: Aiden Barron MD;  Location: [facility] OR;  Service: Orthopedics;  Laterality: Right;    BACK SURGERY      FRACTURE SURGERY      LARYNGOSCOPY N/A 8/18/2022    Procedure: Suspension microlaryngoscopy with KTP laser assisted excision of lesion;  Surgeon: José Wiggins MD;  Location: [address];  Service: ENT;  Laterality: N/A;  Microscope, telescopes, tower, microinstruments, KTP laser; laser confirmed on 8/18 at 10am #[id number] (cL8/12)    LEFT HEART CATHETERIZATION Left 9/19/2019     Procedure: CATHETERIZATION, HEART, LEFT  (RIGHT RADIAL ACCESS);  Surgeon: Ronnie Bailey MD;  Location: [facility] CATH/EP LAB;  Service: Cardiology;  Laterality: Left;    LUMBAR DISC SURGERY Bilateral     L4- L5 , 2016 April,  Trans    RELEASE OF ULNAR NERVE AT CUBITAL TUNNEL Right 8/13/2024    Procedure: Right cubital tunnel release;  Surgeon: Aiden Barron MD;  Location: [facility] OR;  Service: Orthopedics;  Laterality: Right;    REPAIR,TENDON,DISTAL PROXIMAL Left 3/23/2023    Procedure: REPAIR,TENDON,DISTAL PROXIMAL;  Surgeon: Cornell Chan MD;  Location: [facility] OR;  Service: Orthopedics;  Laterality: Left;    VOCAL FOLD LESION EXCISION  2008     Family History   Problem Relation Name Age of Onset    Allergic rhinitis Mother      Hypertension Mother      Heart disease Mother      Stroke Mother      Heart disease Father  49    Cancer Maternal Aunt          breast    Heart disease Maternal Aunt      Angioedema Neg Hx      Asthma Neg Hx      Atopy Neg Hx      Eczema Neg Hx      Immunodeficiency Neg Hx      Urticaria Neg Hx       Social History[1]  Review of Systems   Constitutional:  Negative for fever.   HENT:  Positive for trouble swallowing. Negative for facial swelling, sore throat and voice change.    Respiratory:  Negative for cough, shortness of breath and stridor.    Cardiovascular:  Negative for chest pain.   Gastrointestinal:  Negative for nausea.   Genitourinary:  Negative for dysuria, frequency and hematuria.   Musculoskeletal:  Negative for back pain.   Skin:  Negative for rash.   Neurological:  Negative for dizziness, weakness, numbness and headaches.   Hematological:  Does not bruise/bleed easily.       Physical Exam     Initial Vitals [02/21/25 0323]   BP Pulse Resp Temp SpO2   (!) 165/89 72 16 98.2 °F (36.8 °C) 98 %      MAP       --         Physical Exam    Nursing note and vitals reviewed.  Constitutional: He appears well-developed and well-nourished.   HENT:   Head: Normocephalic and  atraumatic.   Eyes: EOM are normal. Pupils are equal, round, and reactive to light.   Neck:   Normal range of motion.  Cardiovascular:  Normal rate, regular rhythm and normal heart sounds.           Pulmonary/Chest: Breath sounds normal. No respiratory distress.   Abdominal: Abdomen is soft. He exhibits no distension. There is no abdominal tenderness. There is no rebound.   Musculoskeletal:         General: Normal range of motion.      Cervical back: Normal range of motion.     Neurological: He is alert and oriented to person, place, and time. He has normal strength. No cranial nerve deficit or sensory deficit. GCS score is 15. GCS eye subscore is 4. GCS verbal subscore is 5. GCS motor subscore is 6.   Skin: Capillary refill takes less than 2 seconds.   Psychiatric: He has a normal mood and affect.         ED Course   Procedures  Labs Reviewed - No data to display       Imaging Results    None          Medications   dexAMETHasone injection 8 mg (8 mg Intramuscular Given 2/21/25 0355)     Medical Decision Making  Shelly Gannon Jr. is a 49 y.o. male with a past medical history of recurrent dysphagia that presents emergency department for difficulty swallowing his saliva x1 day.  Vitals stable.  No evidence of airway compromise.  No evidence of facial swelling.  He is able to swallow his saliva and manage his secretions in the emergency department.  Afebrile and without any voice changes.  Well-appearing.  Doubt epiglottitis, PTA, RPA, or anaphylaxis.  No evidence of angioedema either.  Per chart review, previously treated with Decadron with good response.  Patient confirms this to me.  Given 8 mg of Decadron, and patient's symptoms have significantly improved.  Tolerating p.o. without difficulty.  Given Medrol Dosepak.  Return precautions given.  Instructed follow up with Gastroenterology.  Has outpatient appointment already scheduled.    Risk  Prescription drug management.                                       Clinical Impression:  Final diagnoses:  [R13.10] Dysphagia, unspecified type (Primary)          ED Disposition Condition    Discharge Stable          ED Prescriptions       Medication Sig Dispense Start Date End Date Auth. Provider    methylPREDNISolone (MEDROL DOSEPACK) 4 mg tablet use as directed 21 each 2/21/2025 3/14/2025 Kota Johnson MD          Follow-up Information       Follow up With Specialties Details Why Contact Info Additional Information    [facility] - Emergency Dept Emergency Medicine  As needed, If symptoms worsen [address] [facility] [id number] [phone] 1st floor                 [1]   Social History  Tobacco Use    Smoking status: Never    Smokeless tobacco: Never   Substance Use Topics    Alcohol use: No    Drug use: No        Kota Johnson MD  02/21/25 5749

## 2025-02-21 NOTE — DISCHARGE INSTRUCTIONS
Please return to the emergency department if you have new or worsening symptoms.  Follow up with the primary care doctor and gastroenterology.

## 2025-07-02 ENCOUNTER — PATIENT OUTREACH (OUTPATIENT)
Dept: ADMINISTRATIVE | Facility: HOSPITAL | Age: 50
End: 2025-07-02
Payer: MEDICAID

## 2025-07-02 NOTE — PROGRESS NOTES
ACTIVE PORTAL MESSAGE SENT    COLON CANCER SCREENING  Outreach to patient in reference to a COLON CANCER SCREENING  HAVE YET TO SCHEDULE COLONOSCOPY.  GASTRO UNABLE TO REACH

## 2025-07-07 ENCOUNTER — TELEPHONE (OUTPATIENT)
Dept: FAMILY MEDICINE | Facility: CLINIC | Age: 50
End: 2025-07-07
Payer: MEDICAID

## 2025-07-07 NOTE — TELEPHONE ENCOUNTER
Patient states he is out of BP medication. Scheduled tomorrow 07/08/25 to see Mrs. Stringer. Verbalized understanding

## 2025-07-07 NOTE — TELEPHONE ENCOUNTER
Copied from CRM #[id number]. Topic: Appointments - Appointment Scheduling  >> Jul 7, 2025  2:06 PM Danyell wrote:  Type:  Sooner Apoointment Request    Caller is requesting a sooner appointment.  Caller declined first available appointment listed below.  Caller will not accept being placed on the waitlist and is requesting a message be sent to doctor.  Name of Caller:pt  When is the first available appointment?8/13  Symptoms:blood pressure medicine  Would the patient rather a call back or a response via [facility]? Call back  Best Call Back Number:[phone]   Additional Information: pt would like a sooner appt   He is out of medicine  Please call back   Thanks

## 2025-07-08 ENCOUNTER — HOSPITAL ENCOUNTER (OUTPATIENT)
Dept: RADIOLOGY | Facility: CLINIC | Age: 50
Discharge: HOME | End: 2025-07-08
Attending: PHYSICIAN ASSISTANT
Payer: MEDICAID

## 2025-07-08 ENCOUNTER — OFFICE VISIT (OUTPATIENT)
Dept: FAMILY MEDICINE | Facility: CLINIC | Age: 50
End: 2025-07-08
Payer: MEDICAID

## 2025-07-08 VITALS
OXYGEN SATURATION: 95 % | WEIGHT: 239 LBS | DIASTOLIC BLOOD PRESSURE: 100 MMHG | BODY MASS INDEX: 32.37 KG/M2 | HEIGHT: 72 IN | SYSTOLIC BLOOD PRESSURE: 160 MMHG | HEART RATE: 63 BPM

## 2025-07-08 DIAGNOSIS — I10 ESSENTIAL HYPERTENSION: Primary | ICD-10-CM

## 2025-07-08 DIAGNOSIS — M54.50 ACUTE ON CHRONIC LOW BACK PAIN: ICD-10-CM

## 2025-07-08 DIAGNOSIS — G89.29 ACUTE ON CHRONIC LOW BACK PAIN: ICD-10-CM

## 2025-07-08 DIAGNOSIS — R00.2 PALPITATIONS: Chronic | ICD-10-CM

## 2025-07-08 PROCEDURE — 1160F RVW MEDS BY RX/DR IN RCRD: CPT | Mod: CPTII,,, | Performed by: PHYSICIAN ASSISTANT

## 2025-07-08 PROCEDURE — 1159F MED LIST DOCD IN RCRD: CPT | Mod: CPTII,,, | Performed by: PHYSICIAN ASSISTANT

## 2025-07-08 PROCEDURE — 3080F DIAST BP >= 90 MM HG: CPT | Mod: CPTII,,, | Performed by: PHYSICIAN ASSISTANT

## 2025-07-08 PROCEDURE — 99999 PR PBB SHADOW E&M-EST. PATIENT-LVL III: CPT | Mod: PBBFAC,,, | Performed by: PHYSICIAN ASSISTANT

## 2025-07-08 PROCEDURE — 4010F ACE/ARB THERAPY RXD/TAKEN: CPT | Mod: CPTII,,, | Performed by: PHYSICIAN ASSISTANT

## 2025-07-08 PROCEDURE — 72100 X-RAY EXAM L-S SPINE 2/3 VWS: CPT | Mod: TC,PO

## 2025-07-08 PROCEDURE — 99213 OFFICE O/P EST LOW 20 MIN: CPT | Mod: PBBFAC,25,PO | Performed by: PHYSICIAN ASSISTANT

## 2025-07-08 PROCEDURE — 3077F SYST BP >= 140 MM HG: CPT | Mod: CPTII,,, | Performed by: PHYSICIAN ASSISTANT

## 2025-07-08 PROCEDURE — 99214 OFFICE O/P EST MOD 30 MIN: CPT | Mod: S$PBB,,, | Performed by: PHYSICIAN ASSISTANT

## 2025-07-08 PROCEDURE — 3008F BODY MASS INDEX DOCD: CPT | Mod: CPTII,,, | Performed by: PHYSICIAN ASSISTANT

## 2025-07-08 PROCEDURE — 72100 X-RAY EXAM L-S SPINE 2/3 VWS: CPT | Mod: 26,,, | Performed by: RADIOLOGY

## 2025-07-08 RX ORDER — METHYLPREDNISOLONE 4 MG/1
TABLET ORAL
Qty: 21 EACH | Refills: 0 | Status: SHIPPED | OUTPATIENT
Start: 2025-07-08

## 2025-07-08 RX ORDER — METOPROLOL SUCCINATE 25 MG/1
25 TABLET, EXTENDED RELEASE ORAL DAILY
Qty: 90 TABLET | Refills: 3 | Status: SHIPPED | OUTPATIENT
Start: 2025-07-08

## 2025-07-08 RX ORDER — LOSARTAN POTASSIUM 25 MG/1
25 TABLET ORAL DAILY
Qty: 90 TABLET | Refills: 3 | Status: SHIPPED | OUTPATIENT
Start: 2025-07-08

## 2025-07-08 NOTE — ASSESSMENT & PLAN NOTE
Continued and refilled Losartan for BP control. Emphasized the importance of monitoring BP at home after restarting medication and instructed patient to do so. Recommend patient return for blood pressure check in 2 weeks however he goes out of town for work 3 weeks at a time. Recommend return for BP check when in town.

## 2025-07-08 NOTE — ASSESSMENT & PLAN NOTE
Patient notes previously prescribed metoprolol for palpitations and he is currently out. Last experienced palpitations about 3 weeks ago but prior to this did not experience for 1-2 years. Will refill metoprolol.

## 2025-07-08 NOTE — ASSESSMENT & PLAN NOTE
Patient reports back pain rated as 9 out of 10, which started 3 weeks ago, with pain in the right upper gluteal area. No bowel or bladder changes, fever, chills, unusual weight loss, swelling in the legs, abdominal pain, or tenderness noted on exam. Prescribed steroid pack for acute back pain exacerbation. Ordered lumbar spine x-ray to assess for acute changes.  Referred patient to pain management clinic for evaluation and possible interventions (e.g., steroid injections). Patient requesting medication such as oxycodone however discussed need for management with more conservative measures initially. Referring to pain management to discuss noninvasive procedures available for back pain management. Instructed patient to go to the emergency department if back pain worsens or chest pain occurs.

## 2025-07-08 NOTE — PROGRESS NOTES
"OFFICE VISIT   7/8/2025    Patient ID: Shelly Gannon Jr. is a 50 y.o. male    SUBJECTIVE:  No chief complaint on file.      HPI/ROS:  History of Present Illness    Patient presents today for medication refills and back pain     He reports back pain that started 3 weeks ago, currently rating pain at 9/10. Pain is located in the right lower back, radiating to the groin area, and is described as "sitting on a nerve". He notes this is the most severe pain experienced in approximately a year, with previous intermittent back pain. He underwent back surgery in 2017 removal of disc at L4 and L5 levels without screws or rods. He has a history of palpitations. Last palpitation episode was approximately one month ago, with prior episodes dating back 1-2 years. He currently denies active chest pain or ongoing palpitations. He takes Metoprolol 25 mg daily (originally prescribed for chest pain and palpitations). He reports being out of metoprolol for 3 weeks and out of losartan for a long period of time.     Review of Systems   Constitutional:  Negative for chills, fatigue, fever and unexpected weight change.   Respiratory:  Negative for cough and shortness of breath.    Cardiovascular:  Negative for chest pain and palpitations.   Gastrointestinal:  Negative for constipation, diarrhea, nausea and vomiting.   Musculoskeletal:  Positive for back pain. Negative for gait problem and myalgias.   Neurological:  Negative for weakness.   Psychiatric/Behavioral:  Negative for sleep disturbance. The patient is not nervous/anxious.        Past Medical History:   Diagnosis Date    Allergy     Anxiety     Hypertension     Migraine headache     Migraine syndrome        Social History[1]    Past Surgical History:   Procedure Laterality Date    ARTHROSCOPY OF ELBOW Right 8/13/2024    Procedure: Right elbow arthroscopy with loose body removal;  Surgeon: Aiden Barron MD;  Location: [facility];  Service: Orthopedics;  Laterality: " Right;    BACK SURGERY      FRACTURE SURGERY      LARYNGOSCOPY N/A 8/18/2022    Procedure: Suspension microlaryngoscopy with KTP laser assisted excision of lesion;  Surgeon: José Wiggins MD;  Location: [address];  Service: ENT;  Laterality: N/A;  Microscope, telescopes, tower, microinstruments, KTP laser; laser confirmed on 8/18 at 10am #[id number] (cL8/12)    LEFT HEART CATHETERIZATION Left 9/19/2019    Procedure: CATHETERIZATION, HEART, LEFT  (RIGHT RADIAL ACCESS);  Surgeon: Ronnie Bailey MD;  Location: [facility] CATH/EP LAB;  Service: Cardiology;  Laterality: Left;    LUMBAR DISC SURGERY Bilateral     L4- L5 , 2016 April,  Trans    RELEASE OF ULNAR NERVE AT CUBITAL TUNNEL Right 8/13/2024    Procedure: Right cubital tunnel release;  Surgeon: Aiden Barron MD;  Location: [facility] OR;  Service: Orthopedics;  Laterality: Right;    REPAIR,TENDON,DISTAL PROXIMAL Left 3/23/2023    Procedure: REPAIR,TENDON,DISTAL PROXIMAL;  Surgeon: Cornell Chan MD;  Location: [facility] OR;  Service: Orthopedics;  Laterality: Left;    VOCAL FOLD LESION EXCISION  2008       OBJECTIVE:    BP (!) 160/100   Pulse 63   Ht 6' (1.829 m)   Wt 108.4 kg (238 lb 15.7 oz)   SpO2 95%   BMI 32.41 kg/m²     Current Medications[2]    Physical Exam  Constitutional:       General: He is not in acute distress.     Appearance: Normal appearance. He is not ill-appearing.   HENT:      Head: Normocephalic and atraumatic.      Right Ear: External ear normal.      Left Ear: External ear normal.      Nose: Nose normal.      Mouth/Throat:      Mouth: Mucous membranes are moist.      Pharynx: Oropharynx is clear.   Eyes:      Extraocular Movements: Extraocular movements intact.      Conjunctiva/sclera: Conjunctivae normal.      Pupils: Pupils are equal, round, and reactive to light.   Cardiovascular:      Rate and Rhythm: Normal rate and regular rhythm.      Pulses: Normal pulses.      Heart sounds: Normal heart sounds.   Pulmonary:      Effort:  Pulmonary effort is normal. No respiratory distress.      Breath sounds: Normal breath sounds.   Abdominal:      General: Abdomen is flat.      Palpations: Abdomen is soft.   Musculoskeletal:         General: No swelling or deformity. Normal range of motion.      Cervical back: Normal range of motion and neck supple.      Right lower leg: No edema.      Left lower leg: No edema.   Skin:     General: Skin is warm and dry.   Neurological:      General: No focal deficit present.      Mental Status: He is alert and oriented to person, place, and time. Mental status is at baseline.   Psychiatric:         Mood and Affect: Mood normal.         Behavior: Behavior normal.         Thought Content: Thought content normal.         Judgment: Judgment normal.         Assessment/Plan:  Assessment & Plan    - Assessed BP medication needs, considering recent non-adherence and current elevated BP.  - Evaluated chronic back pain, noting history of L4-L5 disc surgery in 2017.  - Considered conservative pain management options due to occupation as a , limiting use of muscle relaxants.  - Continued Metoprolol 25 mg daily based on history of palpitations  - Continued Losartan for BP control.  - Started steroid pack for acute back pain exacerbation.  - Determined need for follow-up BP check and future lab work to monitor renal function.  - Patient needing [facility] appointment    FOLLOW-UP:  - Recommend patient to establish care with a new PCP (PCP) within the practice.  - Follow up for BP check when back in town (around August 5th).  - Follow up with new PCP for comprehensive evaluation and lab work.          Problem List Items Addressed This Visit       Palpitations    Patient notes previously prescribed metoprolol for palpitations and he is currently out. Last experienced palpitations about 3 weeks ago but prior to this did not experience for 1-2 years. Will refill metoprolol.          Essential hypertension - Primary     Continued and refilled Losartan for BP control. Emphasized the importance of monitoring BP at home after restarting medication and instructed patient to do so. Recommend patient return for blood pressure check in 2 weeks however he goes out of town for work 3 weeks at a time. Recommend return for BP check when in town.          Relevant Medications    losartan (COZAAR) 25 MG tablet    metoprolol succinate (TOPROL-XL) 25 MG 24 hr tablet    Acute on chronic low back pain    Patient reports back pain rated as 9 out of 10, which started 3 weeks ago, with pain in the right upper gluteal area. No bowel or bladder changes, fever, chills, unusual weight loss, swelling in the legs, abdominal pain, or tenderness noted on exam. Prescribed steroid pack for acute back pain exacerbation. Ordered lumbar spine x-ray to assess for acute changes.  Referred patient to pain management clinic for evaluation and possible interventions (e.g., steroid injections). Patient requesting medication such as oxycodone however discussed need for management with more conservative measures initially. Referring to pain management to discuss noninvasive procedures available for back pain management. Instructed patient to go to the emergency department if back pain worsens or chest pain occurs.         Relevant Medications    methylPREDNISolone (MEDROL DOSEPACK) 4 mg tablet    Other Relevant Orders    X-Ray Lumbar Spine AP And Lateral (Completed)    Ambulatory referral/consult to Pain Clinic       Patient verbalizes understanding of instructions and healthcare topics reviewed. All of patient's questions were answered.  Patient encouraged to contact office if other questions arise.    Health Maintenance Due   Topic Date Due    HIV Screening  Never done    TETANUS VACCINE  Never done    Hemoglobin A1c (Diabetic Prevention Screening)  Never done    COVID-19 Vaccine (1 - 2024-25 season) Never done    Colorectal Cancer Screening  04/30/2025       Follow up  in about 4 weeks (around 8/5/2025) for blood pressure check.    This note was generated with the assistance of ambient listening technology. Verbal consent was obtained by the patient and accompanying visitor(s) for the recording of patient appointment to facilitate this note. I attest to having reviewed and edited the generated note for accuracy, though some syntax or spelling errors may persist. Please contact the author of this note for any clarification.       Minoo Stringer PA-C  Family Medicine Physician Assistant          [1]   Social History  Tobacco Use    Smoking status: Never    Smokeless tobacco: Never   Substance Use Topics    Alcohol use: No    Drug use: No   [2]   Current Outpatient Medications:     fluticasone propionate (FLONASE) 50 mcg/actuation nasal spray, 1 spray (50 mcg total) by Each Nostril route 2 (two) times daily as needed for Rhinitis., Disp: 15 g, Rfl: 0    oxyCODONE (ROXICODONE) 15 MG Tab, Take 1 tablet (15 mg total) by mouth every 4 (four) hours as needed for Pain., Disp: 10 tablet, Rfl: 0    pantoprazole (PROTONIX) 40 MG tablet, Take 1 tablet by mouth once daily, Disp: 30 tablet, Rfl: 11    losartan (COZAAR) 25 MG tablet, Take 1 tablet (25 mg total) by mouth once daily., Disp: 90 tablet, Rfl: 3    methylPREDNISolone (MEDROL DOSEPACK) 4 mg tablet, use as directed, Disp: 21 each, Rfl: 0    metoprolol succinate (TOPROL-XL) 25 MG 24 hr tablet, Take 1 tablet (25 mg total) by mouth once daily., Disp: 90 tablet, Rfl: 3  No current facility-administered medications for this visit.    Facility-Administered Medications Ordered in Other Visits:     0.9%  NaCl infusion, , Intravenous, Continuous, Ronnie Bailey MD, Last Rate: 75 mL/hr at 09/19/19 0930, New Bag at 09/19/19 0930

## 2025-08-07 ENCOUNTER — CLINICAL SUPPORT (OUTPATIENT)
Dept: FAMILY MEDICINE | Facility: CLINIC | Age: 50
End: 2025-08-07
Payer: MEDICAID

## 2025-08-07 ENCOUNTER — TELEPHONE (OUTPATIENT)
Dept: FAMILY MEDICINE | Facility: CLINIC | Age: 50
End: 2025-08-07

## 2025-08-07 VITALS — DIASTOLIC BLOOD PRESSURE: 90 MMHG | SYSTOLIC BLOOD PRESSURE: 136 MMHG | HEART RATE: 72 BPM

## 2025-08-07 DIAGNOSIS — I10 ESSENTIAL HYPERTENSION: Primary | ICD-10-CM

## 2025-08-07 DIAGNOSIS — I10 ESSENTIAL HYPERTENSION: ICD-10-CM

## 2025-08-07 PROCEDURE — 99999 PR PBB SHADOW E&M-EST. PATIENT-LVL II: CPT | Mod: PBBFAC,,,

## 2025-08-07 PROCEDURE — 99212 OFFICE O/P EST SF 10 MIN: CPT | Mod: PBBFAC,PO

## 2025-08-07 RX ORDER — LOSARTAN POTASSIUM 25 MG/1
50 TABLET ORAL DAILY
Start: 2025-08-07 | End: 2025-08-07 | Stop reason: SDUPTHER

## 2025-08-07 RX ORDER — LOSARTAN POTASSIUM 50 MG/1
50 TABLET ORAL DAILY
Qty: 90 TABLET | Refills: 1 | Status: SHIPPED | OUTPATIENT
Start: 2025-08-07

## 2025-08-07 NOTE — PROGRESS NOTES
Shelly Gannon Jr. 50 y.o. male is here today for Blood Pressure check.   History of HTN yes.    Review of patient's allergies indicates:   Allergen Reactions    Ace inhibitors Other (See Comments)     cough    Tramadol Itching    Tylenol [acetaminophen] Other (See Comments)     cough     Creatinine   Date Value Ref Range Status   09/12/2023 1.1 0.5 - 1.4 mg/dL Final     Sodium   Date Value Ref Range Status   09/12/2023 139 136 - 145 mmol/L Final     Potassium   Date Value Ref Range Status   09/12/2023 4.0 3.5 - 5.1 mmol/L Final   ]  Patient verifies taking blood pressure medications on a regular basis at the same time of the day.   Current Medications[1]  Does patient have record of home blood pressure readings yes.   Last dose of blood pressure medication was taken at This morning.  Patient is asymptomatic.       BP: (!) 144/96 , Pulse: 69 .    Blood pressure reading after 15 minutes was 136/90, Pulse 72.  Minoo Stringer notified.            [1]   Current Outpatient Medications:     fluticasone propionate (FLONASE) 50 mcg/actuation nasal spray, 1 spray (50 mcg total) by Each Nostril route 2 (two) times daily as needed for Rhinitis., Disp: 15 g, Rfl: 0    losartan (COZAAR) 25 MG tablet, Take 1 tablet (25 mg total) by mouth once daily., Disp: 90 tablet, Rfl: 3    methylPREDNISolone (MEDROL DOSEPACK) 4 mg tablet, use as directed, Disp: 21 each, Rfl: 0    metoprolol succinate (TOPROL-XL) 25 MG 24 hr tablet, Take 1 tablet (25 mg total) by mouth once daily., Disp: 90 tablet, Rfl: 3    oxyCODONE (ROXICODONE) 15 MG Tab, Take 1 tablet (15 mg total) by mouth every 4 (four) hours as needed for Pain., Disp: 10 tablet, Rfl: 0    pantoprazole (PROTONIX) 40 MG tablet, Take 1 tablet by mouth once daily, Disp: 30 tablet, Rfl: 11  No current facility-administered medications for this visit.    Facility-Administered Medications Ordered in Other Visits:     0.9%  NaCl infusion, , Intravenous, Continuous, Ronnie Bailey MD,  Last Rate: 75 mL/hr at 09/19/19 0930, New Bag at 09/19/19 0930

## 2025-08-07 NOTE — TELEPHONE ENCOUNTER
Pt seen in clinic today for BP check. Pt states that he is taking all medications but BP continues to run high at home. Pts blood pressure today in clinic is 144/96 pulse 69.  Blood pressure reading after 15 minutes was 136/90, Pulse 72.  Patient would like to consider changing medication. States sometimes he have headaches.  Please advise if you would like to change anything.      Thanks.  Katlyn

## 2025-08-07 NOTE — TELEPHONE ENCOUNTER
This has been fully explained to the patient, who indicates understanding.  Patient would like rx sent to [facility].

## (undated) DEVICE — GLOVE BIOGEL PI MICRO SZ 8

## (undated) DEVICE — SPLINT PLASTER FAST SET 5X30IN

## (undated) DEVICE — APPLICATOR CHLORAPREP CLR 10.5

## (undated) DEVICE — SUT 2/0 18IN COATED VICRYL

## (undated) DEVICE — BANDAGE MATRIX HK LOOP 4IN 5YD

## (undated) DEVICE — HANDPIECE KTP

## (undated) DEVICE — DRAPE STERI-DRAPE 1000 17X11IN

## (undated) DEVICE — BANDAGE ESMARK ELASTIC ST 4X9

## (undated) DEVICE — DRESSING XEROFORM NONADH 1X8IN

## (undated) DEVICE — UNDERGLOVES BIOGEL PI SIZE 8.5

## (undated) DEVICE — GLOVE PROTEXIS LTX MICRO  7.5

## (undated) DEVICE — Device

## (undated) DEVICE — SYR 10CC LUER LOCK

## (undated) DEVICE — DRAPE THREE-QTR REINF 53X77IN

## (undated) DEVICE — TOURNIQUET SB QC DP 18X4IN

## (undated) DEVICE — BLADE SURGICAL SAFELOCK #10 ST

## (undated) DEVICE — SUCTION FRAZIER TIP SURG 12FR

## (undated) DEVICE — PACK SIRUS BASIC V SURG STRL

## (undated) DEVICE — TOWEL OR DISP STRL BLUE 4/PK

## (undated) DEVICE — ELECTRODE REM PLYHSV RETURN 9

## (undated) DEVICE — TOURNIQUET 2 PORT RED 18X4IN

## (undated) DEVICE — DRAPE U SPLIT SHEET 54X76IN

## (undated) DEVICE — SUT X424H ETHIBOND 0-0

## (undated) DEVICE — BOWL STERILE LARGE 32OZ

## (undated) DEVICE — SUT CTD VICRYL 0 UND BR

## (undated) DEVICE — KTP/YAG .4MM BARE FIBER

## (undated) DEVICE — AURA LASER

## (undated) DEVICE — SUT 3-0 VICRYL / SH (J416)

## (undated) DEVICE — BLADE SURG CARBON STEEL SZ11

## (undated) DEVICE — SOCKINETTE IMPERVIOUS 12X48IN

## (undated) DEVICE — LOOP VESSEL BLUE MAXI

## (undated) DEVICE — KIT SAHARA DRAPE DRAW/LIFT

## (undated) DEVICE — APPLICATOR CHLORAPREP ORN 26ML

## (undated) DEVICE — ADHESIVE DERMABOND ADVANCED

## (undated) DEVICE — SEE MEDLINE ITEM 157216

## (undated) DEVICE — NDL HYPO STD REG BVL 18GX1.5IN

## (undated) DEVICE — PAD CAST SPECIALIST STRL 4

## (undated) DEVICE — GLOVE SURG ULTRA TOUCH 8

## (undated) DEVICE — GOWN POLY REINF X-LONG XL

## (undated) DEVICE — GOWN POLY REINF BRTH SLV 2XL

## (undated) DEVICE — SHEATH INTRODUCER SLENDER 6FX10CM

## (undated) DEVICE — BNDG COFLEX FOAM LF2 ST 4X5YD

## (undated) DEVICE — STRAP OR TABLE 5IN X 72IN

## (undated) DEVICE — KIT ANTIFOG W/SPONG & FLUID

## (undated) DEVICE — SUT ETHILON 4-0 PS2 18 BLK

## (undated) DEVICE — GLOVE BIOGEL PI ORTHO PRO 7.5

## (undated) DEVICE — HEMOSTAT VASC BAND LONG 27CM

## (undated) DEVICE — GLOVE SURG ULTRA TOUCH 8.5

## (undated) DEVICE — CATHETER RADIAL TIG 4.0 OPTITORQUE 5X110

## (undated) DEVICE — TRAY ENT 4/CS

## (undated) DEVICE — SPONGE COTTON TRAY 4X4IN

## (undated) DEVICE — ALCOHOL 70% ISOP RUBBING 4OZ

## (undated) DEVICE — HANDLE SURG LIGHT NONRIGID

## (undated) DEVICE — SEE L#120831

## (undated) DEVICE — BNDG COFLEX FOAM LF2 ST 6X5YD

## (undated) DEVICE — DRAPE HALF SURGICAL 40X58IN

## (undated) DEVICE — CORD BIPOLAR 12 FOOT

## (undated) DEVICE — SOL 9P NACL IRR PIC IL

## (undated) DEVICE — NDL SURGEON MAYO #4

## (undated) DEVICE — PACK CUSTOM UNIV BASIN SLI

## (undated) DEVICE — FORCEP STRAIGHT DISP

## (undated) DEVICE — BIT DRILL SOFT TISSUE 2.8MM

## (undated) DEVICE — GLOVE PROTEXIS LTX MICRO 8

## (undated) DEVICE — NDL 27G X 1 1/4

## (undated) DEVICE — SUT ETHILON 3-0 PS2 18 BLK

## (undated) DEVICE — STRIP MEDI WND CLSR 1X5IN

## (undated) DEVICE — SLEEVE SCD EXPRESS KNEE MEDIUM

## (undated) DEVICE — DRAPE HAND STERILE

## (undated) DEVICE — TUBING SUC UNIV W/CONN 12FT

## (undated) DEVICE — SPONGE BULKEE II ABSRB 6X6.75

## (undated) DEVICE — ARMCRADLE LAMINECTOMY

## (undated) DEVICE — BANDAGE ESMARK LATEX FREE 4INX

## (undated) DEVICE — SUT MONO 3-0 PS-2 18 PLST